# Patient Record
Sex: MALE | Race: WHITE | NOT HISPANIC OR LATINO | Employment: OTHER | URBAN - METROPOLITAN AREA
[De-identification: names, ages, dates, MRNs, and addresses within clinical notes are randomized per-mention and may not be internally consistent; named-entity substitution may affect disease eponyms.]

---

## 2019-04-22 ENCOUNTER — TRANSFERRED RECORDS (OUTPATIENT)
Dept: HEALTH INFORMATION MANAGEMENT | Facility: CLINIC | Age: 74
End: 2019-04-22

## 2019-06-24 ENCOUNTER — TRANSFERRED RECORDS (OUTPATIENT)
Dept: HEALTH INFORMATION MANAGEMENT | Facility: CLINIC | Age: 74
End: 2019-06-24

## 2020-05-11 ENCOUNTER — TRANSFERRED RECORDS (OUTPATIENT)
Dept: HEALTH INFORMATION MANAGEMENT | Facility: CLINIC | Age: 75
End: 2020-05-11

## 2020-10-21 ENCOUNTER — TRANSFERRED RECORDS (OUTPATIENT)
Dept: HEALTH INFORMATION MANAGEMENT | Facility: CLINIC | Age: 75
End: 2020-10-21

## 2020-11-16 ENCOUNTER — TRANSFERRED RECORDS (OUTPATIENT)
Dept: HEALTH INFORMATION MANAGEMENT | Facility: CLINIC | Age: 75
End: 2020-11-16

## 2020-12-04 ENCOUNTER — TRANSFERRED RECORDS (OUTPATIENT)
Dept: HEALTH INFORMATION MANAGEMENT | Facility: CLINIC | Age: 75
End: 2020-12-04

## 2020-12-28 ENCOUNTER — TRANSFERRED RECORDS (OUTPATIENT)
Dept: HEALTH INFORMATION MANAGEMENT | Facility: CLINIC | Age: 75
End: 2020-12-28

## 2021-05-29 ENCOUNTER — RECORDS - HEALTHEAST (OUTPATIENT)
Dept: ADMINISTRATIVE | Facility: CLINIC | Age: 76
End: 2021-05-29

## 2021-08-13 ENCOUNTER — TRANSFERRED RECORDS (OUTPATIENT)
Dept: HEALTH INFORMATION MANAGEMENT | Facility: CLINIC | Age: 76
End: 2021-08-13

## 2021-09-25 ENCOUNTER — TRANSFERRED RECORDS (OUTPATIENT)
Dept: HEALTH INFORMATION MANAGEMENT | Facility: CLINIC | Age: 76
End: 2021-09-25

## 2021-10-22 ENCOUNTER — TRANSFERRED RECORDS (OUTPATIENT)
Dept: HEALTH INFORMATION MANAGEMENT | Facility: CLINIC | Age: 76
End: 2021-10-22

## 2022-03-07 ENCOUNTER — TRANSFERRED RECORDS (OUTPATIENT)
Dept: HEALTH INFORMATION MANAGEMENT | Facility: CLINIC | Age: 77
End: 2022-03-07

## 2022-07-01 ENCOUNTER — TRANSFERRED RECORDS (OUTPATIENT)
Dept: HEALTH INFORMATION MANAGEMENT | Facility: CLINIC | Age: 77
End: 2022-07-01

## 2023-05-04 ENCOUNTER — TRANSFERRED RECORDS (OUTPATIENT)
Dept: HEALTH INFORMATION MANAGEMENT | Facility: CLINIC | Age: 78
End: 2023-05-04

## 2023-06-01 ENCOUNTER — TRANSFERRED RECORDS (OUTPATIENT)
Dept: HEALTH INFORMATION MANAGEMENT | Facility: CLINIC | Age: 78
End: 2023-06-01

## 2023-11-16 ENCOUNTER — ANCILLARY PROCEDURE (OUTPATIENT)
Dept: CARDIOLOGY | Facility: CLINIC | Age: 78
End: 2023-11-16
Attending: FAMILY MEDICINE
Payer: COMMERCIAL

## 2023-11-16 DIAGNOSIS — I71.20 ANEURYSM OF THORACIC AORTA (H): ICD-10-CM

## 2023-11-16 PROCEDURE — 93306 TTE W/DOPPLER COMPLETE: CPT | Performed by: INTERNAL MEDICINE

## 2023-11-16 RX ADMIN — Medication 7 ML: at 09:47

## 2023-12-21 LAB — LVEF ECHO: NORMAL

## 2024-03-02 ENCOUNTER — HEALTH MAINTENANCE LETTER (OUTPATIENT)
Age: 79
End: 2024-03-02

## 2024-04-15 ENCOUNTER — TRANSFERRED RECORDS (OUTPATIENT)
Dept: HEALTH INFORMATION MANAGEMENT | Facility: CLINIC | Age: 79
End: 2024-04-15
Payer: COMMERCIAL

## 2024-04-15 LAB
ALT SERPL-CCNC: 20 U/L
AST SERPL-CCNC: 36 U/L (ref 17–59)
CREATININE (EXTERNAL): 0.9 MG/DL (ref 0.7–1.4)
GLUCOSE (EXTERNAL): 87 MG/DL (ref 60–99)
POTASSIUM (EXTERNAL): 4.5 MMOL/L (ref 3.5–5.1)

## 2024-04-17 ENCOUNTER — TRANSFERRED RECORDS (OUTPATIENT)
Dept: HEALTH INFORMATION MANAGEMENT | Facility: CLINIC | Age: 79
End: 2024-04-17
Payer: COMMERCIAL

## 2024-04-17 ENCOUNTER — MEDICAL CORRESPONDENCE (OUTPATIENT)
Dept: HEALTH INFORMATION MANAGEMENT | Facility: CLINIC | Age: 79
End: 2024-04-17
Payer: COMMERCIAL

## 2024-04-17 LAB
ABO/RH(D): ABNORMAL
ANTIBODY SCREEN: POSITIVE
SPECIMEN EXPIRATION DATE: ABNORMAL

## 2024-04-17 NOTE — PROGRESS NOTES
Colon and Rectal Surgery Clinic Note    RE: Juan Delarosa.  : 1945.  RIRI: 2024.    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     HPI: 77 y/o M who started with progressive dyspnea since the fall .  He noticed that once after chopping wood outside and becoming slightly lightheaded.  He has not been on supplemental oxygen because of this.  He did see his primary care provider who started a workup consisting of chest x-ray followed by a CT chest that reportedly showed elevation of his right hemidiaphragm and concerns for fluid or small cystic masses below his right hemidiaphragm.  This subsequently led to further imaging:       CT CAP (4/15/24)      CT Chest (24):         MR Peritoneum (24)   IMPRESSION:   1. Ruptured appendiceal mucocele.   2.  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   3.  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   4.  Recommend surgical consultation.     Colonoscopy   Findings:  A 4 mm polyp was found in the cecum. The polyp was removed with a   cold snare. Resection and retrieval were complete.  A 0.8-1cm polyp was found in the transverse colon. The polyp was   removed with a cold snare. Resection and retrieval were complete.  Multiple large-mouthed diverticula were found in the colon. There was   no evidence of diverticular bleeding.    FINAL DIAGNOSIS   A.  Colon, Cecal polypectomy, polypectomy:    Vegetable matter and hyperplastic surface change     B.  Colon, Transverse colon polypectomy, polypectomy:    Tubular adenoma    Apart from the mild shortness of breath, Je is feeling quite well.  He is very functional and independent.  Denies abdominal pain, fevers or chills, increased abdominal distention, unintentional weight loss, or changes in bowel habits.  Denies hematochezia.  No previous abdominal operations.  He does take a baby aspirin every other day.  Denies chest pain or palpitations.   "Family history significant for gastric cancer in a maternal grandmother.    Medical history:  No past medical history on file.    Surgical history:  Past Surgical History:   Procedure Laterality Date    IR MISCELLANEOUS PROCEDURE  1/18/2006    IR MISCELLANEOUS PROCEDURE  1/18/2006       Family history:  No family history on file.    Medications:  Current Outpatient Medications   Medication Sig Dispense Refill    ezetimibe (ZETIA) 10 MG tablet Take 10 mg by mouth daily      losartan (COZAAR) 25 MG tablet Take 25 mg by mouth daily      rosuvastatin (CRESTOR) 5 MG tablet take 1 tablet by mouth once daily for 5 days         Allergies:  No Known Allergies    Social history:   Social History     Tobacco Use    Smoking status: Never    Smokeless tobacco: Never   Substance Use Topics    Alcohol use: Not on file     Marital status: .    Physical Examination:  /83 (BP Location: Left arm, Patient Position: Sitting, Cuff Size: Adult Regular)   Pulse 61   Ht 1.74 m (5' 8.5\")   Wt 91.5 kg (201 lb 12.8 oz)   SpO2 99%   BMI 30.24 kg/m    General: Well hydrated. No acute distress.  Abdomen: Soft, NT. No abdominal masses or inguinal adenopathy palpated.    Investigations:  None.    Procedures:  None.    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  I do not see a specific area that we could biopsy percutaneously.  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  We had a long discussion with regards to the potential diagnoses and how to establish a diagnosis at this time.  Percutaneous biopsies are somewhat inaccurate and frequently indeterminant.  Risks, benefits, and alternatives of operative treatment were thoroughly discussed with the " patient, he/she understands these well and agrees to proceed.    PLAN  1.  Schedule full labs including tumor markers including CEA, CA 19-9, and .  2.  Schedule diagnostic laparoscopy, peritoneal biopsies, and possible appendectomy.  I would only perform the appendectomy if it is clear that there is minimal at peritoneal involvement.  This is mainly to establish the diagnosis and if he does have peritoneal involvement fully establish the extent of it with a PCI.  He will need PAC and labs as above.    60 minutes spent on the date of encounter performing chart review, history and exam, documentation.     Justin Dela Cruz MD, MSc, FACS, FASCRS.    Chief, Division of Colon & Rectal Surgery  Stanley M. Goldberg, MD Endowed Chair, Colon & Rectal Surgery  Department of Surgery  [facility]      Referring Provider:  Kiet Mckeon MD    Primary Care Provider:  Sean Melendrez

## 2024-04-18 ENCOUNTER — LAB (OUTPATIENT)
Dept: LAB | Facility: CLINIC | Age: 79
End: 2024-04-18
Payer: COMMERCIAL

## 2024-04-18 ENCOUNTER — TRANSCRIBE ORDERS (OUTPATIENT)
Dept: OTHER | Age: 79
End: 2024-04-18

## 2024-04-18 ENCOUNTER — PRE VISIT (OUTPATIENT)
Dept: SURGERY | Facility: CLINIC | Age: 79
End: 2024-04-18

## 2024-04-18 ENCOUNTER — TELEPHONE (OUTPATIENT)
Dept: SURGERY | Facility: CLINIC | Age: 79
End: 2024-04-18

## 2024-04-18 ENCOUNTER — OFFICE VISIT (OUTPATIENT)
Dept: SURGERY | Facility: CLINIC | Age: 79
End: 2024-04-18
Payer: COMMERCIAL

## 2024-04-18 VITALS
SYSTOLIC BLOOD PRESSURE: 137 MMHG | HEIGHT: 69 IN | HEART RATE: 61 BPM | BODY MASS INDEX: 29.89 KG/M2 | WEIGHT: 201.8 LBS | DIASTOLIC BLOOD PRESSURE: 83 MMHG | OXYGEN SATURATION: 99 %

## 2024-04-18 DIAGNOSIS — C18.1 MALIGNANT NEOPLASM OF APPENDIX (H): ICD-10-CM

## 2024-04-18 DIAGNOSIS — C78.6 PSEUDOMYXOMA PERITONEI (H): Primary | ICD-10-CM

## 2024-04-18 DIAGNOSIS — C18.1 MALIGNANT NEOPLASM OF APPENDIX (H): Primary | ICD-10-CM

## 2024-04-18 DIAGNOSIS — C78.6 PSEUDOMYXOMA PERITONEI (H): ICD-10-CM

## 2024-04-18 DIAGNOSIS — R97.8 OTHER ABNORMAL TUMOR MARKERS: ICD-10-CM

## 2024-04-18 LAB
ALBUMIN SERPL BCG-MCNC: 4.5 G/DL (ref 3.5–5.2)
ALP SERPL-CCNC: 75 U/L (ref 40–150)
ALT SERPL W P-5'-P-CCNC: 17 U/L (ref 0–70)
ANION GAP SERPL CALCULATED.3IONS-SCNC: 10 MMOL/L (ref 7–15)
ANTIBODY ID: NORMAL
AST SERPL W P-5'-P-CCNC: 24 U/L (ref 0–45)
BASOPHILS # BLD AUTO: 0 10E3/UL (ref 0–0.2)
BASOPHILS NFR BLD AUTO: 1 %
BILIRUB SERPL-MCNC: 0.5 MG/DL
BUN SERPL-MCNC: 17.7 MG/DL (ref 8–23)
CALCIUM SERPL-MCNC: 9.8 MG/DL (ref 8.8–10.2)
CHLORIDE SERPL-SCNC: 103 MMOL/L (ref 98–107)
CREAT SERPL-MCNC: 0.99 MG/DL (ref 0.67–1.17)
DEPRECATED HCO3 PLAS-SCNC: 28 MMOL/L (ref 22–29)
EGFRCR SERPLBLD CKD-EPI 2021: 77 ML/MIN/1.73M2
EOSINOPHIL # BLD AUTO: 0.1 10E3/UL (ref 0–0.7)
EOSINOPHIL NFR BLD AUTO: 1 %
ERYTHROCYTE [DISTWIDTH] IN BLOOD BY AUTOMATED COUNT: 13.4 % (ref 10–15)
GLUCOSE SERPL-MCNC: 99 MG/DL (ref 70–99)
HCT VFR BLD AUTO: 47.5 % (ref 40–53)
HGB BLD-MCNC: 15.8 G/DL (ref 13.3–17.7)
IMM GRANULOCYTES # BLD: 0 10E3/UL
IMM GRANULOCYTES NFR BLD: 0 %
LYMPHOCYTES # BLD AUTO: 1.6 10E3/UL (ref 0.8–5.3)
LYMPHOCYTES NFR BLD AUTO: 24 %
M AG RBC QL: NEGATIVE
MCH RBC QN AUTO: 30.2 PG (ref 26.5–33)
MCHC RBC AUTO-ENTMCNC: 33.3 G/DL (ref 31.5–36.5)
MCV RBC AUTO: 91 FL (ref 78–100)
MONOCYTES # BLD AUTO: 0.7 10E3/UL (ref 0–1.3)
MONOCYTES NFR BLD AUTO: 10 %
NEUTROPHILS # BLD AUTO: 4.3 10E3/UL (ref 1.6–8.3)
NEUTROPHILS NFR BLD AUTO: 64 %
NRBC # BLD AUTO: 0 10E3/UL
NRBC BLD AUTO-RTO: 0 /100
PLATELET # BLD AUTO: 268 10E3/UL (ref 150–450)
POTASSIUM SERPL-SCNC: 4.9 MMOL/L (ref 3.4–5.3)
PROT SERPL-MCNC: 7.9 G/DL (ref 6.4–8.3)
RBC # BLD AUTO: 5.23 10E6/UL (ref 4.4–5.9)
SODIUM SERPL-SCNC: 141 MMOL/L (ref 135–145)
SPECIMEN EXPIRATION DATE: NORMAL
SPECIMEN EXPIRATION DATE: NORMAL
WBC # BLD AUTO: 6.8 10E3/UL (ref 4–11)

## 2024-04-18 PROCEDURE — 82378 CARCINOEMBRYONIC ANTIGEN: CPT | Performed by: COLON & RECTAL SURGERY

## 2024-04-18 PROCEDURE — 85025 COMPLETE CBC W/AUTO DIFF WBC: CPT | Performed by: PATHOLOGY

## 2024-04-18 PROCEDURE — 86905 BLOOD TYPING RBC ANTIGENS: CPT | Performed by: COLON & RECTAL SURGERY

## 2024-04-18 PROCEDURE — 86870 RBC ANTIBODY IDENTIFICATION: CPT

## 2024-04-18 PROCEDURE — 99205 OFFICE O/P NEW HI 60 MIN: CPT | Performed by: COLON & RECTAL SURGERY

## 2024-04-18 PROCEDURE — 86900 BLOOD TYPING SEROLOGIC ABO: CPT

## 2024-04-18 PROCEDURE — 80053 COMPREHEN METABOLIC PANEL: CPT | Performed by: PATHOLOGY

## 2024-04-18 PROCEDURE — 84134 ASSAY OF PREALBUMIN: CPT | Performed by: COLON & RECTAL SURGERY

## 2024-04-18 PROCEDURE — 86301 IMMUNOASSAY TUMOR CA 19-9: CPT | Mod: 90 | Performed by: PATHOLOGY

## 2024-04-18 PROCEDURE — 36415 COLL VENOUS BLD VENIPUNCTURE: CPT | Performed by: PATHOLOGY

## 2024-04-18 PROCEDURE — 86304 IMMUNOASSAY TUMOR CA 125: CPT | Mod: GA | Performed by: COLON & RECTAL SURGERY

## 2024-04-18 PROCEDURE — 99000 SPECIMEN HANDLING OFFICE-LAB: CPT | Performed by: PATHOLOGY

## 2024-04-18 RX ORDER — LOSARTAN POTASSIUM 25 MG/1
25 TABLET ORAL AT BEDTIME
Status: ON HOLD | COMMUNITY
End: 2024-05-15

## 2024-04-18 RX ORDER — EZETIMIBE 10 MG/1
10 TABLET ORAL AT BEDTIME
COMMUNITY

## 2024-04-18 RX ORDER — ROSUVASTATIN CALCIUM 5 MG/1
5 TABLET, COATED ORAL EVERY OTHER DAY
COMMUNITY

## 2024-04-18 ASSESSMENT — PAIN SCALES - GENERAL: PAINLEVEL: NO PAIN (0)

## 2024-04-18 NOTE — TELEPHONE ENCOUNTER
Spoke with patient's spouse Bren, she says that an EVARISTO should be on-file for her to speak on patient's behalf. She was with patient at his appointment with Dr. Dela Cruz in which they discussed scheduling surgery for patient.    Patient was not available, made plans to call back later or tomorrow morning when patient will also be available.     Capri Blanton  Edilma-op Coordinator  [place]  Direct Phone: [phone]        no

## 2024-04-18 NOTE — TELEPHONE ENCOUNTER
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 Melendrez   OFFICE NOTE from other specialist N/A    DISCHARGE SUMMARY from hospital N/A    DISCHARGE REPORT from the ER N/A    OPERATIVE REPORT N/A    MEDICATION LIST Received    LABS     ANAL PAP/CEA N/A    BIOPSIES/PATHOLOGY RELATED TO DIAGNOSIS Care Everywhere [facility]:  22 - Colon Biopsy (Case: [id number])   DIAGNOSTIC PROCEDURES     PFC TESTING (from the Pelvic floor center includes Manometry, PDNL, EMG, etc.) N/A    COLONOSCOPY (most recent all time after 5 years) Care Everywhere [facility]:  22 - Colonoscopy   FLEX SIGMOIDOSCOPY N/A    UPPER ENDOSCOPY (EGD) N/A    ERCP N/A    IMAGING (DISC & REPORT)      CT In process [facility] - CT Chest  4/15/24 - CT Abd/Pelvis   MRI Received [facility]:  24 - MRI Abd/Pelvis   XRAY N/A    ULTRASOUND  (ENDOANAL/ENDORECTAL) N/A      Records Requested  24    Facility  [facility]  Fax: [phone]  [facility]  Fax: [phone]   Outcome * 24 7:44 AM Faxed urgent request to [facility] and [facility] for records and images to be sent to the clinic. - Shelia    * 24 10:02 AM Images received from  and attached to the patient in PACs. - Shelia    * 24 10:25 AM Records received from [facility] and sent to [facility] to be scanned into the chart. - Shelia    * 24 2:36 PM Imaging disc received from [facility] and sent to [facility] to be uploaded into PACs. - Shelia    Trackin

## 2024-04-18 NOTE — PATIENT INSTRUCTIONS
Follow up:    Schedule surgery with Capri at [phone]  Pre-op physical   Labs today at 12pm    Cardiology clearance from your cardiologist      Please call with any questions or concerns regarding your clinic visit today.    It is a pleasure being involved in your health care.    Contacts post-consultation depending on your need:    Radiology Appointments [phone]    Schedule Clinic Appointments [phone] # 1   M-F 7:30 - 5 pm    DARNELL Wheeler [phone]    Clinic Fax Number [phone]    Surgery Scheduling [phone]    My Chart is available 24 hours a day and is a secure way to access your records and communicate with your care team.  I strongly recommend signing up if you haven't already done so, if you are comfortable with computers.  If you would like to inquire about this or are having problems with My Chart access, you may call [phone] or go online at [email or web address].  Please allow at least 24 hours for a response and extra time on weekends and Holidays.

## 2024-04-18 NOTE — NURSING NOTE
"Chief Complaint   Patient presents with    Cancer     Appendiceal Cancer       Vitals:    04/18/24 1039   BP: 137/83   BP Location: Left arm   Patient Position: Sitting   Cuff Size: Adult Regular   Pulse: 61   SpO2: 99%   Weight: 201 lb 12.8 oz   Height: 5' 8.5\"       Body mass index is 30.24 kg/m .    Bradly FLORESP    "

## 2024-04-18 NOTE — LETTER
2024       RE: Juan Delarosa  [address]  [facility] [address]       Dear Colleague,    Thank you for referring your patient, Juan Delarosa, to the [facility] COLON AND RECTAL SURGERY CLINIC [place] at [facility]. Please see a copy of my visit note below.    Colon and Rectal Surgery Clinic Note    RE: Juan Delarosa.  : 1945.  RIRI: 2024.    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     HPI: 77 y/o M who started with progressive dyspnea since the 2023.  He noticed that once after chopping wood outside and becoming slightly lightheaded.  He has not been on supplemental oxygen because of this.  He did see his primary care provider who started a workup consisting of chest x-ray followed by a CT chest that reportedly showed elevation of his right hemidiaphragm and concerns for fluid or small cystic masses below his right hemidiaphragm.  This subsequently led to further imaging:       CT CAP (4/15/24)      CT Chest (24):         MR Peritoneum (24)   IMPRESSION:   1. Ruptured appendiceal mucocele.   2.  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   3.  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   4.  Recommend surgical consultation.     Colonoscopy   Findings:  A 4 mm polyp was found in the cecum. The polyp was removed with a   cold snare. Resection and retrieval were complete.  A 0.8-1cm polyp was found in the transverse colon. The polyp was   removed with a cold snare. Resection and retrieval were complete.  Multiple large-mouthed diverticula were found in the colon. There was   no evidence of diverticular bleeding.    FINAL DIAGNOSIS   A.  Colon, Cecal polypectomy, polypectomy:    Vegetable matter and hyperplastic surface change     B.  Colon, Transverse colon polypectomy, polypectomy:    Tubular adenoma    Apart from the mild shortness of breath, Je is feeling quite  "well.  He is very functional and independent.  Denies abdominal pain, fevers or chills, increased abdominal distention, unintentional weight loss, or changes in bowel habits.  Denies hematochezia.  No previous abdominal operations.  He does take a baby aspirin every other day.  Denies chest pain or palpitations.  Family history significant for gastric cancer in a maternal grandmother.    Medical history:  No past medical history on file.    Surgical history:  Past Surgical History:   Procedure Laterality Date    IR MISCELLANEOUS PROCEDURE  1/18/2006    IR MISCELLANEOUS PROCEDURE  1/18/2006       Family history:  No family history on file.    Medications:  Current Outpatient Medications   Medication Sig Dispense Refill    ezetimibe (ZETIA) 10 MG tablet Take 10 mg by mouth daily      losartan (COZAAR) 25 MG tablet Take 25 mg by mouth daily      rosuvastatin (CRESTOR) 5 MG tablet take 1 tablet by mouth once daily for 5 days         Allergies:  No Known Allergies    Social history:   Social History     Tobacco Use    Smoking status: Never    Smokeless tobacco: Never   Substance Use Topics    Alcohol use: Not on file     Marital status: .    Physical Examination:  /83 (BP Location: Left arm, Patient Position: Sitting, Cuff Size: Adult Regular)   Pulse 61   Ht 1.74 m (5' 8.5\")   Wt 91.5 kg (201 lb 12.8 oz)   SpO2 99%   BMI 30.24 kg/m    General: Well hydrated. No acute distress.  Abdomen: Soft, NT. No abdominal masses or inguinal adenopathy palpated.    Investigations:  None.    Procedures:  None.    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  I do not see a specific area that we could biopsy percutaneously.  His imaging is more consistent with a low-grade appendiceal mucinous neoplasm versus carcinomatosis per se but we do not have a " tissue diagnosis at this time.  We had a long discussion with regards to the potential diagnoses and how to establish a diagnosis at this time.  Percutaneous biopsies are somewhat inaccurate and frequently indeterminant.  Risks, benefits, and alternatives of operative treatment were thoroughly discussed with the patient, he/she understands these well and agrees to proceed.    PLAN  1.  Schedule full labs including tumor markers including CEA, CA 19-9, and .  2.  Schedule diagnostic laparoscopy, peritoneal biopsies, and possible appendectomy.  I would only perform the appendectomy if it is clear that there is minimal at peritoneal involvement.  This is mainly to establish the diagnosis and if he does have peritoneal involvement fully establish the extent of it with a PCI.  He will need PAC and labs as above.    60 minutes spent on the date of encounter performing chart review, history and exam, documentation.       Referring Provider:  Kiet Mckeon MD    Primary Care Provider:  Sean Melendrez      Again, thank you for allowing me to participate in the care of your patient.      Sincerely,    Justin Dela Cruz MD

## 2024-04-19 LAB
CANCER AG125 SERPL-ACNC: 38 U/ML
CEA SERPL-MCNC: 4.2 NG/ML
PREALB SERPL-MCNC: 23.2 MG/DL (ref 20–40)

## 2024-04-19 NOTE — TELEPHONE ENCOUNTER
Spoke with patient and spouse Bren via phone, explained we will find out early this afternoon whether patient's surgery with Dr. Dela Cruz can be scheduled on Thurs 5/2/2024.    Otherwise, the back-up surgery date would be 6/14/2024.    Tentatively scheduled surgery-related appts for possible surgery date 5/2/2024.    Capri Blanton  Edilma-op Coordinator  [place]  Direct Phone: [phone]

## 2024-04-19 NOTE — TELEPHONE ENCOUNTER
FUTURE VISIT INFORMATION      SURGERY INFORMATION:  Date: 5/2/24  Location: [facility] or  Surgeon:  Justin Dela Cruz MD   Anesthesia Type:  general  Procedure: LAPAROSCOPY, DIAGNOSTIC, laparoscopic appendectomy   Consult: ov 4/18/24    RECORDS REQUESTED FROM:       Primary Care Provider: Dexter Tijerina MD  - [facility]    Most recent Cardiac Stress Test: 12/4/23- [facility]

## 2024-04-19 NOTE — TELEPHONE ENCOUNTER
Patient is scheduled for surgery with Dr. Justin Dela Cruz     Spoke with: Juan    Date of Surgery: Tues 5/14/2024    Location: [facility] OR    Informed patient they will need an adult  (surgery admit)    Pre op with Provider Dr. Justin Dela Cruz     Upcoming Related Appointments:     Tue Apr 30, 2024 10:30 AM  Pre-op Physical  (Arrive by 10:15 AM) anesthesiology clinic  PAC EVALUATION with LISA Anders  [facility] Preoperative Assessment [facility] [phone]    [address] [place] [address]      Tue Apr 30, 2024 11:45 AM  LAB with  LAB  [facility] Lab [place] ([facility] ) [phone]    [address] [place] [address]      Wed May 29, 2024 11:00 AM  (Arrive by 10:45 AM)  Post Op with LISA Lassiter [facility] [phone]    [address] [place] [address]      Mon Jun 24, 2024  9:30 AM  (Arrive by 9:15 AM)  Post Op with Justin Dela Cruz MD  [facility] Colon and Rectal Surgery [facility] [phone]    [address] [place] [address]      Surgery packet: sent via [employer]     Additional comments:   - surgery date 5/2/2024 was approved, but unfortunately Dr. Justin Dela Cruz is not available that day d/t a conflict with his schedule.    Capri Blanton  Edilma-op Coordinator  [place]  Direct Phone: [phone]

## 2024-04-20 LAB — CANCER AG19-9 SERPL IA-ACNC: 9 U/ML

## 2024-04-26 ENCOUNTER — PRE VISIT (OUTPATIENT)
Dept: SURGERY | Facility: CLINIC | Age: 79
End: 2024-04-26

## 2024-04-30 ENCOUNTER — PRE VISIT (OUTPATIENT)
Dept: SURGERY | Facility: CLINIC | Age: 79
End: 2024-04-30

## 2024-04-30 ENCOUNTER — ANESTHESIA EVENT (OUTPATIENT)
Dept: SURGERY | Facility: CLINIC | Age: 79
End: 2024-04-30
Payer: MEDICARE

## 2024-04-30 ENCOUNTER — OFFICE VISIT (OUTPATIENT)
Dept: SURGERY | Facility: CLINIC | Age: 79
End: 2024-04-30
Payer: COMMERCIAL

## 2024-04-30 VITALS
RESPIRATION RATE: 16 BRPM | HEART RATE: 58 BPM | WEIGHT: 200 LBS | DIASTOLIC BLOOD PRESSURE: 80 MMHG | OXYGEN SATURATION: 97 % | BODY MASS INDEX: 29.62 KG/M2 | SYSTOLIC BLOOD PRESSURE: 133 MMHG | TEMPERATURE: 97.5 F | HEIGHT: 69 IN

## 2024-04-30 DIAGNOSIS — C18.1 MALIGNANT NEOPLASM OF APPENDIX (H): ICD-10-CM

## 2024-04-30 DIAGNOSIS — Z01.818 PREOP EXAMINATION: Primary | ICD-10-CM

## 2024-04-30 PROCEDURE — 99204 OFFICE O/P NEW MOD 45 MIN: CPT | Performed by: CLINICAL NURSE SPECIALIST

## 2024-04-30 RX ORDER — MULTIVIT-MIN/IRON/FOLIC ACID/K 18-600-40
5000 CAPSULE ORAL AT BEDTIME
Status: ON HOLD | COMMUNITY
End: 2024-06-24

## 2024-04-30 RX ORDER — NITROGLYCERIN 0.4 MG/1
0.4 TABLET SUBLINGUAL EVERY 5 MIN PRN
COMMUNITY
End: 2024-07-08

## 2024-04-30 RX ORDER — ASPIRIN 81 MG/1
81 TABLET ORAL
Status: ON HOLD | COMMUNITY
End: 2024-06-24

## 2024-04-30 ASSESSMENT — PAIN SCALES - GENERAL: PAINLEVEL: NO PAIN (0)

## 2024-04-30 ASSESSMENT — LIFESTYLE VARIABLES: TOBACCO_USE: 0

## 2024-04-30 ASSESSMENT — ENCOUNTER SYMPTOMS
DYSRHYTHMIAS: 0
SEIZURES: 0

## 2024-04-30 NOTE — H&P
Pre-Operative H & P     CC:  Preoperative exam to assess for increased cardiopulmonary risk while undergoing surgery and anesthesia.    Date of Encounter: 4/30/2024  Primary Care Physician:  Sean Melendrez     Reason for visit:   Encounter Diagnoses   Name Primary?    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  His primary team initiated a workup consisting of chest x-ray followed by a CT of chest that revealed elevation of the right hemidiaphragm and there was concern for fluid or some small cystic masses below his right hemidiaphragm. Additional work up revealed evidence of a cystic neoplasm of the appendix with evidence of rupture and possible adenomucinosis versus carcinomatosis. He was referred to Dr. Dela Cruz with imaging reviewed. Due to lack of tissue diagnosis, but concern for low-grade appendiceal mucinous neoplasm versus carcinomatosis on imaging, patient was counseled for above procedures.    His history is otherwise significant for HLD, HTN, CAD, s/p PCI to mid LCx, (mild-moderate CAD in distal LM, LAD, and RCA), dilated ascending aorta, GERD, kidney stone, and blood antibody.  For his cardiac issues he is followed by cardiology last seen on 11/30/2023. An exercise stress test was recommended at that time with results below from 12/4/23     Summary    1. The patient  exercised for 10 minutes and 10 seconds on the  Ellis protocol.     2. Excellent functional capacity for age.     3. The patient experienced dyspnea (moderate to severe) during the stress test.     4. Dyspnea resolved appropriately during recovery.     5. Hypertensive response to stress (peak 182/74 mmHg).     6. Ectopy consisted of frequent PVCs and occasional ventricular couplets during exercise and in recovery.     7. Negative stress ECG for ST segment depression.     8. Normal left ventricular systolic function with no regional wall motion abnormalities noted at rest.   LVEF 55%.     9. Post stress, no wall motion abnormalities seen.   LV systolic size decreases and systolic function increases appropriately.     10. No echocardiographic evidence of ischemia.     11. Mild aortic valve regurgitation.     12. The aortic root measures mildly dilated at 4.2 cm.     13. The proximal ascending aorta measures moderately dilated at 4.8 cm.     14. Compared to the prior TTE report from 10/14/22, the prox asc aorta has grown in size (4.6 -> 4.8 cm). AR now appears mild in severity (previously trace).     Patient reports that he will be seeing his Cardiology team again on 5/3/24 for cardiac preop evaluation for surgery.       Hx of abnormal bleeding or anti-platelet use: ASA 81 mg at HS      Past Medical History  Past Medical History:   Diagnosis Date    Ascending aorta dilation (H24)     CAD (coronary artery disease)     Concussion     Gastroesophageal reflux disease     HLD (hyperlipidemia)     HTN (hypertension)     Kidney stone     Malignant neoplasm of appendix (H)     PONV (postoperative nausea and vomiting)     Pseudomyxoma peritonei (H)     Vertigo        Past Surgical History  Past Surgical History:   Procedure Laterality Date    COLONOSCOPY      mulitple    IR MISCELLANEOUS PROCEDURE  01/18/2006    IR MISCELLANEOUS PROCEDURE  01/18/2006    KNEE SURGERY Bilateral     LUMBAR DISCECTOMY      PERCUTANEOUS NEPHROSTOMY       SHOULDER SURGERY         Prior to Admission Medications  Current Outpatient Medications   Medication Sig Dispense Refill    aspirin 81 MG EC tablet Take 81 mg by mouth at bedtime      ezetimibe (ZETIA) 10 MG tablet Take 10 mg by mouth at bedtime      losartan (COZAAR) 25 MG tablet Take 25 mg by mouth at bedtime      nitroGLYcerin (NITROSTAT) 0.4 MG sublingual tablet Place 0.4 mg under the tongue every 5 minutes as needed for chest pain      rosuvastatin (CRESTOR) 5 MG tablet Take 5 mg by mouth at bedtime      Cholecalciferol (VITAMIN D) 50 MCG (2000 UT) CAPS Take 5,000 Units by mouth daily         Allergies  Allergies   Allergen Reactions    Blood Transfusion Related (Informational Only)      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  A delay in compatible RBCs may occur.    Gabapentin Dizziness       Social History  Social History     Socioeconomic History    Marital status:      Spouse name: Not on file    Number of children: Not on file    Years of education: Not on file    Highest education level: Not on file   Occupational History    Not on file   Tobacco Use    Smoking status: Never    Smokeless tobacco: Never   Substance and Sexual Activity    Alcohol use: Never    Drug use: Never    Sexual activity: Not on file   Other Topics Concern    Not on file   Social History Narrative    Not on file     Social Determinants of Health     Financial Resource Strain: Not on file   Food Insecurity: Not on file   Transportation Needs: Not on file   Physical Activity: Not on file   Stress: Not on file   Social Connections: Not on file   Interpersonal Safety: Not on file   Housing Stability: Not on file       Family History  Family History   Problem Relation Age of Onset    Diabetes Type 2  Sister     Diabetes Type 2  Sister     Diabetes Type 2  Sister     Diabetes Type 2  Brother     Diabetes Type 2  Brother     Diabetes Type 2  Brother     Diabetes Type 2  Brother   "   Diabetes Type 2  Brother     Diabetes Type 2  Brother     Stomach Cancer Maternal Grandmother     Anesthesia Reaction No family hx of     Clotting Disorder No family hx of     Bleeding Disorder No family hx of        Review of Systems  The complete review of systems is negative other than noted in the HPI or here.   Anesthesia Evaluation   Pt has had prior anesthetic. Type: General and MAC.    No history of anesthetic complications       ROS/MED HX  ENT/Pulmonary:    (-) tobacco use and recent URI   Neurologic:    (-) no seizures and no CVA   Cardiovascular: Comment: Borderline to mild aortic root enlargement. Moderate to severe ascending  aortic dilation measuring 4.8 cm in diameter.      (+) Dyslipidemia hypertension- Peripheral Vascular Disease-- Other.  CAD -  - stent-2020. 1 Drug Eluting Stent.                               Previous cardiac testing   Echo: Date: 11/16/23 Results:    Stress Test:  Date: 12/4/23 Results:    ECG Reviewed:  Date: 2020 Results:  SR, nonspecific T wave abnormality  Cath:  Date: 2020 Results:   (-) arrhythmias   METS/Exercise Tolerance:     Hematologic:     (+)       history of blood transfusion, no previous transfusion reaction, Known PRBC Anitbodies: Yes, - Anti-M,   (-) history of blood clots   Musculoskeletal:  - neg musculoskeletal ROS     GI/Hepatic:    (-) GERD   Renal/Genitourinary:     (+)       Nephrolithiasis ,       Endo:  - neg endo ROS     Psychiatric/Substance Use:  - neg psychiatric ROS  (-) psychiatric history   Infectious Disease:  - neg infectious disease ROS     Malignancy:   (+) Malignancy, History of GI.GI CA  Active status post.      Other:  - neg other ROS          /80 (BP Location: Right arm, Patient Position: Sitting, Cuff Size: Adult Large)   Pulse 58   Temp 97.5  F (36.4  C) (Oral)   Resp 16   Ht 1.74 m (5' 8.5\")   Wt 90.7 kg (200 lb)   SpO2 97%   BMI 29.97 kg/m      Physical Exam   Constitutional: Awake, alert, cooperative, no apparent " distress, and appears stated age. Accompanied by wife.   Eyes: Pupils equal, round and reactive to light, extra ocular muscles intact, sclera clear, conjunctiva normal.  HENT: Normocephalic, oral pharynx with moist mucus membranes, good dentition. No goiter appreciated.   Respiratory: Clear to auscultation bilaterally, no crackles or wheezing. No cough or obvious dyspnea.  Cardiovascular: Regular rate and rhythm, normal S1 and S2, and no murmur noted. Carotids +2, no bruits. No edema. Palpable pulses to radial  DP and PT arteries.   GI: Normal bowel sounds, soft, non-distended, non-tender, no masses palpated, no hepatosplenomegaly.    Lymph/Hematologic: No cervical lymphadenopathy and no supraclavicular lymphadenopathy.  Genitourinary:  Deferred  Skin: Warm and dry.  No rashes at anticipated surgical site.   Musculoskeletal: Full ROM of neck. There is no redness, warmth, or swelling of the joints. Gross motor strength is normal.    Neurologic: Awake, alert, oriented to name, place and time. Cranial nerves II-XII are grossly intact. Gait is normal.   Neuropsychiatric: Calm, cooperative. Normal affect.     Prior Labs/Diagnostic Studies   All labs and imaging personally reviewed   Lab Results   Component Value Date    WBC 6.8 04/18/2024     Lab Results   Component Value Date    RBC 5.23 04/18/2024     Lab Results   Component Value Date    HGB 15.8 04/18/2024     Lab Results   Component Value Date    HCT 47.5 04/18/2024     Lab Results   Component Value Date    MCV 91 04/18/2024     Lab Results   Component Value Date    MCH 30.2 04/18/2024     Lab Results   Component Value Date    MCHC 33.3 04/18/2024     Lab Results   Component Value Date    RDW 13.4 04/18/2024     Lab Results   Component Value Date     04/18/2024     Last Comprehensive Metabolic Panel:  Sodium   Date Value Ref Range Status   04/18/2024 141 135 - 145 mmol/L Final     Comment:     Reference intervals for this test were updated on 09/26/2023 to  more accurately reflect our healthy population. There may be differences in the flagging of prior results with similar values performed with this method. Interpretation of those prior results can be made in the context of the updated reference intervals.      Potassium   Date Value Ref Range Status   04/18/2024 4.9 3.4 - 5.3 mmol/L Final     Chloride   Date Value Ref Range Status   04/18/2024 103 98 - 107 mmol/L Final     Carbon Dioxide (CO2)   Date Value Ref Range Status   04/18/2024 28 22 - 29 mmol/L Final     Anion Gap   Date Value Ref Range Status   04/18/2024 10 7 - 15 mmol/L Final     Glucose   Date Value Ref Range Status   04/18/2024 99 70 - 99 mg/dL Final     Urea Nitrogen   Date Value Ref Range Status   04/18/2024 17.7 8.0 - 23.0 mg/dL Final     Creatinine   Date Value Ref Range Status   04/18/2024 0.99 0.67 - 1.17 mg/dL Final     GFR Estimate   Date Value Ref Range Status   04/18/2024 77 >60 mL/min/1.73m2 Final     Calcium   Date Value Ref Range Status   04/18/2024 9.8 8.8 - 10.2 mg/dL Final     Bilirubin Total   Date Value Ref Range Status   04/18/2024 0.5 <=1.2 mg/dL Final     Alkaline Phosphatase   Date Value Ref Range Status   04/18/2024 75 40 - 150 U/L Final     Comment:     Reference intervals for this test were updated on 11/14/2023 to more accurately reflect our healthy population. There may be differences in the flagging of prior results with similar values performed with this method. Interpretation of those prior results can be made in the context of the updated reference intervals.     ALT   Date Value Ref Range Status   04/18/2024 17 0 - 70 U/L Final     Comment:     Reference intervals for this test were updated on 6/12/2023 to more accurately reflect our healthy population. There may be differences in the flagging of prior results with similar values performed with this method. Interpretation of those prior results can be made in the context of the updated reference intervals.       AST    Date Value Ref Range Status   2024 24 0 - 45 U/L Final     Comment:     Reference intervals for this test were updated on 2023 to more accurately reflect our healthy population. There may be differences in the flagging of prior results with similar values performed with this method. Interpretation of those prior results can be made in the context of the updated reference intervals.     EK Sinus rhythm, nonspecific T wave abnormality    Echocardiogram 23   Interpretation Summary     There is mild concentric left ventricular hypertrophy.  The visual ejection fraction is 50-55%.  There is mild inferior wall hypokinesis.  The right ventricle is normal in size and function.  No significant valve disease.  Borderline to mild aortic root enlargement. Moderate to severe ascending  aortic dilation measuring 4.8 cm in diameter.    MR abd/pelvis 24    IMPRESSION:   1. Ruptured appendiceal mucocele.   2.  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   3.  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   4.  Recommend surgical consultation.   The patient's records and results personally reviewed by this provider.     Outside records reviewed from: Care Everywhere        Assessment    Juan Delarosa is a 78 year old male seen as a PAC referral for risk assessment and optimization for anesthesia.    Plan/Recommendations  Pt will be optimized for the proposed procedure.  See below for details on the assessment, risk, and preoperative recommendations    NEUROLOGY  - No history of TIA, CVA or seizure  History of concussion 1-2 years ago. No residual symptoms  -Post Op delirium risk factors:  Age    ENT  - No current airway concerns.  Will need to be reassessed day of surgery.  Mallampati: II  TM: > 3    History of positional vertigo s/p multiple therapies including Epley maneuver. No significant issues for past year    CARDIAC  HLD. Zetia and Crestor at HS. HYPERTENSION.  "Good control. Losartan at HS. CAD s/p PCI with JO ANN to mid-LCx . Low-normal LVEF 50-55%, mild inferior wall hypokinesis. Aortic root moderately dilated at 4.8 cm. Proximal ascending aorta had grown in size (4.6->4.8cm) on last echocardiogram. ASA 81 mg at HS, but will hold for 7 days prior to surgery. Denies chest pain or irregular HR. Continues to have some DYSPNEA ON EXERTION which led to evaluation above.     Patient reports that he has a cardiology visit on 5/3/24 for cardiac preop evaluation for surgery.     - METS (Metabolic Equivalents)<4    RCRI: 6.6% risk of serious cardiac events    PULMONARY  Denies asthma, cough or use of inhaler.   Intermediate risk for DEEP  - Tobacco History    History   Smoking Status    Never   Smokeless Tobacco    Never       GI: Denies GERD.   PONV Medium Risk  Total Score: 2           1 AN PONV: Patient is not a current smoker    1 AN PONV: Patient has history of PONV      PONV. Past history. Final decisions regarding prophylaxis by Anesthesia on DOS.    /RENAL  - Baseline Creatinine  0.99  History of kidney stones    ENDOCRINE    - BMI: Estimated body mass index is 29.97 kg/m  as calculated from the following:    Height as of this encounter: 1.74 m (5' 8.5\").    Weight as of this encounter: 90.7 kg (200 lb).  Overweight (BMI 25.0-29.9)  - No history of Diabetes Mellitus    HEME: VTE risk 3%  Denies personal or family history of blood clots  Denies personal or family history of bleeding disorder  Denies history of blood transfusion     Type and screen drawn by Dr. Dela Cruz on 4/18/24 revealed Anti-M blood antibody. Patient not aware and was provided written and verbal information. Discussed with Blood Bank. Patient is from [place] but plan was made for him to have updated type and screen day before surgery at [facility]. Order provided and lab appt made by coordinator.     May require updated type and screen on DOS     Different anesthesia methods/types have been " "discussed with the patient, but they are aware that the final plan will be decided by the assigned anesthesia provider on the date of service.  Patient was discussed with Dr Arce    The patient is optimized for their procedure. AVS with information on surgery time/arrival time, meds and NPO status given by nursing staff. No further diagnostic testing indicated.    ADDENDUM: 5/13/24    Updated type and screen drawn today at [facility] as planned.    Patient also had cardiac preop evaluation on 5/3/24 in [place].     From notes:   \"#pre-op cardiac risk assessment  -No CP, HF or arrhythmia symptoms. Dyspnea on exertion has actually been improving.  -DEMARCUS 12/2023 showed excellent exercise capacity for his age and gender, normal heart function.  -no further cardiac testing indicated at this time.  He is on good medical therapy. Overall low CV risk.     #CAD s/p PCI with JO ANN to mid-LCx (mild-moderate CAD in distal LM, LAD, and RCA),  -- Low-normal LVEF 50-55%, mild inferior wall hypokinesis.      #asc aortic aneurysm 4.8cm  -repeat TTR fall 2024 planned     Follow up: Fall 2024 as previously recommended.  Of course, the patient was encouraged to contact us sooner with questions or concerns.\"    On the day of service:     Prep time: 15 minutes  Visit time: 17 minutes  Documentation time: 15 minutes  ------------------------------------------  Total time: 47 minutes      LISA Anders CNS  Preoperative Assessment Center  [facility]  Clinic and Surgery Center  Phone: [phone]  Fax: [phone]    "

## 2024-04-30 NOTE — PATIENT INSTRUCTIONS
Preparing for Your Surgery      Name:  Juan Delarosa   MRN:  [id number]   :  1945   Today's Date:  2024       Arriving for surgery:  Surgery date:  24  Arrival time:  11:40 am    Please come to:     [facility]    [address] [place]  [address]     The [facility] ([facility] [place] Patient/Visitor Ramp is at [address]. Patients and visitors who self-park will receive the reduced hospital parking rate. If the Patient /Visitor Ramp is full, please follow the signs to the [employer] car park located at the main hospital entrance.       parking is available (24 hours/ 7 days a week)      Discounted parking pass options are available for patients and visitors. They can be purchased at the [employer] desk at the [facility] hospital entrance.     -  Stop at the security desk and they will direct surgery patients to Registration and the Surgery Check in and Family Lounge. [phone]        - If you need directions, a wheelchair or an escort please stop at the Information/security desk in the lobby.     What can I eat or drink?  -  Follow [facility] instructions for starting a Bowel Prep and Clear Liquid diet.  -  You may have clear liquids until 2 hours prior to arrival time. (Until 9:40 am)    Examples of clear liquids:  Water  Clear broth  Juices (apple, white grape, white cranberry  and cider) without pulp  Noncarbonated, powder based beverages  (lemonade and Isai-Aid)  Sodas (Sprite, 7-Up, ginger ale and seltzer)  Coffee or tea (without milk or cream)  Gatorade    -  No Alcohol or cannabis products for at least 24 hours before surgery.     Which medicines can I take?  Hold Aspirin for 7 days before surgery. Take the last Aspirin on 24, and then hold until surgery.  Hold Multivitamins for 7 days before surgery.  Hold Supplements for 7 days before surgery.  Hold Ibuprofen (Advil,  Motrin) for 1 day before surgery--unless otherwise directed by surgeon.  Hold Naproxen (Aleve) for 4 days before surgery.  Acetaminophen (Tylenol) is okay to take if needed.    -  DO NOT take these medications the day of surgery:  Vitamin D (Cholecalciferol)    -  PLEASE TAKE these medications the day of surgery:  Acetaminophen (Tylenol) if needed    How do I prepare myself?  - Please take 2 showers (one the night prior to surgery and one the morning of surgery) using Scrubcare or Hibiclens soap.    Use this soap only from the neck to your toes.     Leave the soap on your skin for one minute--then rinse thoroughly.      You may use your own shampoo and conditioner. No other hair products.   - Please remove all jewelry and body piercings.  - No lotions, deodorants or fragrance.  - No makeup or fingernail polish.   - Bring your ID and insurance card.    -If you use a CPAP machine, please bring the CPAP machine, tubing, and mask to hospital.    -If you have a Deep Brain Stimulator, Spinal Cord Stimulator, or any Neuro Stimulator device---you must bring the remote control to the hospital.      ALL PATIENTS GOING HOME THE SAME DAY OF SURGERY ARE REQUIRED TO HAVE A RESPONSIBLE ADULT TO DRIVE AND BE IN ATTENDANCE WITH THEM FOR 24 HOURS FOLLOWING SURGERY.    Covid testing policy as of 12/06/2022  Your surgeon will notify and schedule you for a COVID test if one is needed before surgery--please direct any questions or COVID symptoms to your surgeon      Questions or Concerns:    - For any questions regarding the day of surgery or your hospital stay, please contact the Pre Admission Nursing Office at [phone].       - If you have health changes between today and your surgery, please call your surgeon.       - For questions after surgery, please call your surgeons office.           Current Visitor Guidelines    You may have 2 visitors in the pre op area.    Visiting hours: 8 a.m. to 8:30 p.m.    Patients confirmed or  suspected to have symptoms of COVID 19 or flu:     No visitors allowed for adult patients.   Children (under age 18) can have 1 named visitor.     People who are sick or showing symptoms of COVID 19 or flu:    Are not allowed to visit patients--we can only make exceptions in special situations.       Please follow these guidelines for your visit:          Please maintain social distance          Masking is optional--however at times you may be asked to wear a mask for the safety of yourself and others     Clean your hands with alcohol hand . Do this when you arrive at and leave the building and patient room,    And again after you touch your mask or anything in the room.     Go directly to and from the room you are visiting.     Stay in the patient s room during your visit. Limit going to other places in the hospital as much as possible     Leave bags and jackets at home or in the car.     For everyone s health, please don t come and go during your visit. That includes for smoking   during your visit.

## 2024-05-02 ENCOUNTER — TEAM CONFERENCE (OUTPATIENT)
Dept: SURGERY | Facility: CLINIC | Age: 79
End: 2024-05-02
Payer: COMMERCIAL

## 2024-05-02 NOTE — PROGRESS NOTES
COLON AND RECTAL SURGERY HUDDLE:    Patient was reviewed in preparation for their surgery the following was reviewed and has been completed:    Surgeon: Dr. Justin Dela Cruz    Surgery & Date: 5/14   PLAN  1.  Schedule full labs including tumor markers including CEA, CA 19-9, and .  2.  Schedule diagnostic laparoscopy, peritoneal biopsies, and possible appendectomy.  I would only perform the appendectomy if it is clear that there is minimal at peritoneal involvement.  This is mainly to establish the diagnosis and if he does have peritoneal involvement fully establish the extent of it with a PCI.  He will need PAC and labs as above.     Last MD Note: reviewed    Anesthesia Type: General    Other Providers: No    PAC: Yes    WOC: No    Labs: Yes    Bowel Prep: Yes MiraLAX / Gatorade , Antibiotic, and Magnesium Citrate    Packet: Yes    Imaging: No    Post-Op Appointments: Yes    Pre op soap: Yes Questions about shower: no    Is patient on TPN?: No   If yes, I contacted the TPN pharmacist by paging the  pharmacy at [phone] or calling [phone]. I also contacted Lucia PERALTA with inpatient colon and rectal team.     Pre op call complete: Yes

## 2024-05-12 LAB
ABO/RH(D): ABNORMAL
ANTIBODY SCREEN: POSITIVE
SPECIMEN EXPIRATION DATE: ABNORMAL

## 2024-05-13 ENCOUNTER — LAB (OUTPATIENT)
Dept: LAB | Facility: CLINIC | Age: 79
End: 2024-05-13
Payer: COMMERCIAL

## 2024-05-13 DIAGNOSIS — C18.1 MALIGNANT NEOPLASM OF APPENDIX (H): ICD-10-CM

## 2024-05-13 DIAGNOSIS — Z01.818 PREOP EXAMINATION: ICD-10-CM

## 2024-05-13 PROCEDURE — 86850 RBC ANTIBODY SCREEN: CPT

## 2024-05-13 PROCEDURE — 86901 BLOOD TYPING SEROLOGIC RH(D): CPT

## 2024-05-13 PROCEDURE — 86900 BLOOD TYPING SEROLOGIC ABO: CPT

## 2024-05-13 PROCEDURE — 36415 COLL VENOUS BLD VENIPUNCTURE: CPT

## 2024-05-14 ENCOUNTER — ANESTHESIA (OUTPATIENT)
Dept: SURGERY | Facility: CLINIC | Age: 79
End: 2024-05-14
Payer: MEDICARE

## 2024-05-14 ENCOUNTER — HOSPITAL ENCOUNTER (OUTPATIENT)
Facility: CLINIC | Age: 79
Setting detail: OBSERVATION
Discharge: HOME | End: 2024-05-15
Attending: COLON & RECTAL SURGERY | Admitting: COLON & RECTAL SURGERY
Payer: MEDICARE

## 2024-05-14 DIAGNOSIS — C78.6 PSEUDOMYXOMA PERITONEI (H): Primary | ICD-10-CM

## 2024-05-14 LAB
ANION GAP SERPL CALCULATED.3IONS-SCNC: 11 MMOL/L (ref 7–15)
BLD PROD TYP BPU: NORMAL
BLOOD COMPONENT TYPE: NORMAL
BUN SERPL-MCNC: 19.5 MG/DL (ref 8–23)
CALCIUM SERPL-MCNC: 8.4 MG/DL (ref 8.8–10.2)
CHLORIDE SERPL-SCNC: 105 MMOL/L (ref 98–107)
CODING SYSTEM: NORMAL
CREAT SERPL-MCNC: 0.86 MG/DL (ref 0.67–1.17)
CROSSMATCH: NORMAL
DEPRECATED HCO3 PLAS-SCNC: 23 MMOL/L (ref 22–29)
EGFRCR SERPLBLD CKD-EPI 2021: 88 ML/MIN/1.73M2
ERYTHROCYTE [DISTWIDTH] IN BLOOD BY AUTOMATED COUNT: 13.8 % (ref 10–15)
GLUCOSE SERPL-MCNC: 177 MG/DL (ref 70–99)
HCT VFR BLD AUTO: 37.9 % (ref 40–53)
HGB BLD-MCNC: 12.4 G/DL (ref 13.3–17.7)
MAGNESIUM SERPL-MCNC: 1.9 MG/DL (ref 1.7–2.3)
MCH RBC QN AUTO: 30.5 PG (ref 26.5–33)
MCHC RBC AUTO-ENTMCNC: 32.7 G/DL (ref 31.5–36.5)
MCV RBC AUTO: 93 FL (ref 78–100)
PHOSPHATE SERPL-MCNC: 3.3 MG/DL (ref 2.5–4.5)
PLATELET # BLD AUTO: 237 10E3/UL (ref 150–450)
POTASSIUM SERPL-SCNC: 4.1 MMOL/L (ref 3.4–5.3)
RBC # BLD AUTO: 4.07 10E6/UL (ref 4.4–5.9)
SODIUM SERPL-SCNC: 139 MMOL/L (ref 135–145)
TROPONIN T SERPL HS-MCNC: 6 NG/L
UNIT ABO/RH: NORMAL
UNIT NUMBER: NORMAL
UNIT STATUS: NORMAL
UNIT TYPE ISBT: 6200
WBC # BLD AUTO: 11.1 10E3/UL (ref 4–11)

## 2024-05-14 PROCEDURE — 250N000013 HC RX MED GY IP 250 OP 250 PS 637: Performed by: COLON & RECTAL SURGERY

## 2024-05-14 PROCEDURE — 80048 BASIC METABOLIC PNL TOTAL CA: CPT

## 2024-05-14 PROCEDURE — 710N000010 HC RECOVERY PHASE 1, LEVEL 2, PER MIN: Performed by: COLON & RECTAL SURGERY

## 2024-05-14 PROCEDURE — 44970 LAPAROSCOPY APPENDECTOMY: CPT | Mod: GC | Performed by: COLON & RECTAL SURGERY

## 2024-05-14 PROCEDURE — 999N000141 HC STATISTIC PRE-PROCEDURE NURSING ASSESSMENT: Performed by: COLON & RECTAL SURGERY

## 2024-05-14 PROCEDURE — 250N000011 HC RX IP 250 OP 636: Performed by: NURSE ANESTHETIST, CERTIFIED REGISTERED

## 2024-05-14 PROCEDURE — 250N000011 HC RX IP 250 OP 636: Mod: JZ | Performed by: COLON & RECTAL SURGERY

## 2024-05-14 PROCEDURE — 88304 TISSUE EXAM BY PATHOLOGIST: CPT | Mod: 26 | Performed by: PATHOLOGY

## 2024-05-14 PROCEDURE — G0378 HOSPITAL OBSERVATION PER HR: HCPCS

## 2024-05-14 PROCEDURE — 86922 COMPATIBILITY TEST ANTIGLOB: CPT | Performed by: ANESTHESIOLOGY

## 2024-05-14 PROCEDURE — 250N000013 HC RX MED GY IP 250 OP 250 PS 637

## 2024-05-14 PROCEDURE — 258N000003 HC RX IP 258 OP 636

## 2024-05-14 PROCEDURE — 36415 COLL VENOUS BLD VENIPUNCTURE: CPT

## 2024-05-14 PROCEDURE — 250N000011 HC RX IP 250 OP 636: Performed by: ANESTHESIOLOGY

## 2024-05-14 PROCEDURE — 272N000001 HC OR GENERAL SUPPLY STERILE: Performed by: COLON & RECTAL SURGERY

## 2024-05-14 PROCEDURE — 250N000011 HC RX IP 250 OP 636

## 2024-05-14 PROCEDURE — 250N000024 HC ISOFLURANE, PER MIN: Performed by: COLON & RECTAL SURGERY

## 2024-05-14 PROCEDURE — 83735 ASSAY OF MAGNESIUM: CPT

## 2024-05-14 PROCEDURE — 44970 LAPAROSCOPY APPENDECTOMY: CPT | Performed by: ANESTHESIOLOGY

## 2024-05-14 PROCEDURE — 85027 COMPLETE CBC AUTOMATED: CPT

## 2024-05-14 PROCEDURE — 258N000003 HC RX IP 258 OP 636: Performed by: NURSE ANESTHETIST, CERTIFIED REGISTERED

## 2024-05-14 PROCEDURE — 99100 ANES PT EXTEME AGE<1 YR&>70: CPT | Performed by: ANESTHESIOLOGY

## 2024-05-14 PROCEDURE — 93005 ELECTROCARDIOGRAM TRACING: CPT | Mod: XU

## 2024-05-14 PROCEDURE — 370N000017 HC ANESTHESIA TECHNICAL FEE, PER MIN: Performed by: COLON & RECTAL SURGERY

## 2024-05-14 PROCEDURE — 88304 TISSUE EXAM BY PATHOLOGIST: CPT | Mod: TC | Performed by: COLON & RECTAL SURGERY

## 2024-05-14 PROCEDURE — 258N000003 HC RX IP 258 OP 636: Performed by: ANESTHESIOLOGY

## 2024-05-14 PROCEDURE — 250N000009 HC RX 250

## 2024-05-14 PROCEDURE — 84484 ASSAY OF TROPONIN QUANT: CPT

## 2024-05-14 PROCEDURE — 250N000011 HC RX IP 250 OP 636: Performed by: COLON & RECTAL SURGERY

## 2024-05-14 PROCEDURE — 84100 ASSAY OF PHOSPHORUS: CPT

## 2024-05-14 PROCEDURE — 360N000076 HC SURGERY LEVEL 3, PER MIN: Performed by: COLON & RECTAL SURGERY

## 2024-05-14 PROCEDURE — 250N000009 HC RX 250: Performed by: NURSE ANESTHETIST, CERTIFIED REGISTERED

## 2024-05-14 RX ORDER — NALOXONE HYDROCHLORIDE 0.4 MG/ML
0.2 INJECTION, SOLUTION INTRAMUSCULAR; INTRAVENOUS; SUBCUTANEOUS
Status: DISCONTINUED | OUTPATIENT
Start: 2024-05-14 | End: 2024-05-14 | Stop reason: HOSPADM

## 2024-05-14 RX ORDER — IBUPROFEN 200 MG
600 TABLET ORAL
Status: CANCELLED | OUTPATIENT
Start: 2024-05-14

## 2024-05-14 RX ORDER — FENTANYL CITRATE 50 UG/ML
25 INJECTION, SOLUTION INTRAMUSCULAR; INTRAVENOUS EVERY 5 MIN PRN
Status: DISCONTINUED | OUTPATIENT
Start: 2024-05-14 | End: 2024-05-14 | Stop reason: HOSPADM

## 2024-05-14 RX ORDER — AMOXICILLIN 250 MG
1 CAPSULE ORAL 2 TIMES DAILY PRN
Status: DISCONTINUED | OUTPATIENT
Start: 2024-05-14 | End: 2024-05-15

## 2024-05-14 RX ORDER — METHOCARBAMOL 500 MG/1
500 TABLET, FILM COATED ORAL 4 TIMES DAILY PRN
Qty: 30 TABLET | Refills: 1 | Status: ON HOLD | OUTPATIENT
Start: 2024-05-14 | End: 2024-06-24

## 2024-05-14 RX ORDER — OXYCODONE HYDROCHLORIDE 5 MG/1
5 TABLET ORAL EVERY 4 HOURS PRN
Status: DISCONTINUED | OUTPATIENT
Start: 2024-05-14 | End: 2024-05-15

## 2024-05-14 RX ORDER — NALOXONE HYDROCHLORIDE 0.4 MG/ML
0.2 INJECTION, SOLUTION INTRAMUSCULAR; INTRAVENOUS; SUBCUTANEOUS
Status: DISCONTINUED | OUTPATIENT
Start: 2024-05-14 | End: 2024-05-15 | Stop reason: HOSPADM

## 2024-05-14 RX ORDER — BUPIVACAINE HYDROCHLORIDE 2.5 MG/ML
INJECTION, SOLUTION EPIDURAL; INFILTRATION; INTRACAUDAL
Status: COMPLETED | OUTPATIENT
Start: 2024-05-14 | End: 2024-05-14

## 2024-05-14 RX ORDER — HYDRALAZINE HYDROCHLORIDE 20 MG/ML
2.5-5 INJECTION INTRAMUSCULAR; INTRAVENOUS EVERY 10 MIN PRN
Status: DISCONTINUED | OUTPATIENT
Start: 2024-05-14 | End: 2024-05-14

## 2024-05-14 RX ORDER — GRANISETRON HYDROCHLORIDE 1 MG/ML
1 INJECTION INTRAVENOUS ONCE
Status: COMPLETED | OUTPATIENT
Start: 2024-05-14 | End: 2024-05-14

## 2024-05-14 RX ORDER — NALOXONE HYDROCHLORIDE 0.4 MG/ML
0.4 INJECTION, SOLUTION INTRAMUSCULAR; INTRAVENOUS; SUBCUTANEOUS
Status: DISCONTINUED | OUTPATIENT
Start: 2024-05-14 | End: 2024-05-14 | Stop reason: HOSPADM

## 2024-05-14 RX ORDER — AMOXICILLIN 250 MG
2 CAPSULE ORAL 2 TIMES DAILY PRN
Status: DISCONTINUED | OUTPATIENT
Start: 2024-05-14 | End: 2024-05-15

## 2024-05-14 RX ORDER — HYDROMORPHONE HCL IN WATER/PF 6 MG/30 ML
0.4 PATIENT CONTROLLED ANALGESIA SYRINGE INTRAVENOUS EVERY 5 MIN PRN
Status: DISCONTINUED | OUTPATIENT
Start: 2024-05-14 | End: 2024-05-14

## 2024-05-14 RX ORDER — ASPIRIN 81 MG/1
81 TABLET ORAL AT BEDTIME
Status: CANCELLED | OUTPATIENT
Start: 2024-05-15

## 2024-05-14 RX ORDER — FENTANYL CITRATE 50 UG/ML
25 INJECTION, SOLUTION INTRAMUSCULAR; INTRAVENOUS
Status: CANCELLED | OUTPATIENT
Start: 2024-05-14

## 2024-05-14 RX ORDER — NITROGLYCERIN 0.4 MG/1
0.4 TABLET SUBLINGUAL EVERY 5 MIN PRN
Status: DISCONTINUED | OUTPATIENT
Start: 2024-05-14 | End: 2024-05-15 | Stop reason: HOSPADM

## 2024-05-14 RX ORDER — METRONIDAZOLE 500 MG/100ML
500 INJECTION, SOLUTION INTRAVENOUS
Status: COMPLETED | OUTPATIENT
Start: 2024-05-14 | End: 2024-05-14

## 2024-05-14 RX ORDER — PROPOFOL 10 MG/ML
INJECTION, EMULSION INTRAVENOUS PRN
Status: DISCONTINUED | OUTPATIENT
Start: 2024-05-14 | End: 2024-05-14

## 2024-05-14 RX ORDER — DEXAMETHASONE SODIUM PHOSPHATE 4 MG/ML
INJECTION, SOLUTION INTRA-ARTICULAR; INTRALESIONAL; INTRAMUSCULAR; INTRAVENOUS; SOFT TISSUE PRN
Status: DISCONTINUED | OUTPATIENT
Start: 2024-05-14 | End: 2024-05-14

## 2024-05-14 RX ORDER — DEXMEDETOMIDINE HYDROCHLORIDE 4 UG/ML
INJECTION, SOLUTION INTRAVENOUS
Status: COMPLETED | OUTPATIENT
Start: 2024-05-14 | End: 2024-05-14

## 2024-05-14 RX ORDER — ONDANSETRON 2 MG/ML
4 INJECTION INTRAMUSCULAR; INTRAVENOUS EVERY 30 MIN PRN
Status: DISCONTINUED | OUTPATIENT
Start: 2024-05-14 | End: 2024-05-14 | Stop reason: HOSPADM

## 2024-05-14 RX ORDER — CEFAZOLIN SODIUM/WATER 2 G/20 ML
2 SYRINGE (ML) INTRAVENOUS SEE ADMIN INSTRUCTIONS
Status: DISCONTINUED | OUTPATIENT
Start: 2024-05-14 | End: 2024-05-14 | Stop reason: HOSPADM

## 2024-05-14 RX ORDER — FENTANYL CITRATE 50 UG/ML
25-50 INJECTION, SOLUTION INTRAMUSCULAR; INTRAVENOUS
Status: DISCONTINUED | OUTPATIENT
Start: 2024-05-14 | End: 2024-05-14 | Stop reason: HOSPADM

## 2024-05-14 RX ORDER — HYDROMORPHONE HCL IN WATER/PF 6 MG/30 ML
0.2 PATIENT CONTROLLED ANALGESIA SYRINGE INTRAVENOUS EVERY 4 HOURS PRN
Status: DISCONTINUED | OUTPATIENT
Start: 2024-05-14 | End: 2024-05-15 | Stop reason: HOSPADM

## 2024-05-14 RX ORDER — OXYCODONE HYDROCHLORIDE 5 MG/1
5 TABLET ORAL
Status: DISCONTINUED | OUTPATIENT
Start: 2024-05-14 | End: 2024-05-15

## 2024-05-14 RX ORDER — CEFAZOLIN SODIUM/WATER 2 G/20 ML
2 SYRINGE (ML) INTRAVENOUS
Status: COMPLETED | OUTPATIENT
Start: 2024-05-14 | End: 2024-05-14

## 2024-05-14 RX ORDER — ONDANSETRON 2 MG/ML
4 INJECTION INTRAMUSCULAR; INTRAVENOUS EVERY 30 MIN PRN
Status: CANCELLED | OUTPATIENT
Start: 2024-05-14

## 2024-05-14 RX ORDER — KETOROLAC TROMETHAMINE 30 MG/ML
15 INJECTION, SOLUTION INTRAMUSCULAR; INTRAVENOUS ONCE
Status: COMPLETED | OUTPATIENT
Start: 2024-05-14 | End: 2024-05-14

## 2024-05-14 RX ORDER — FENTANYL CITRATE 50 UG/ML
INJECTION, SOLUTION INTRAMUSCULAR; INTRAVENOUS PRN
Status: DISCONTINUED | OUTPATIENT
Start: 2024-05-14 | End: 2024-05-14

## 2024-05-14 RX ORDER — EPHEDRINE SULFATE 50 MG/ML
INJECTION, SOLUTION INTRAMUSCULAR; INTRAVENOUS; SUBCUTANEOUS PRN
Status: DISCONTINUED | OUTPATIENT
Start: 2024-05-14 | End: 2024-05-14

## 2024-05-14 RX ORDER — LIDOCAINE 40 MG/G
CREAM TOPICAL
Status: DISCONTINUED | OUTPATIENT
Start: 2024-05-14 | End: 2024-05-14 | Stop reason: HOSPADM

## 2024-05-14 RX ORDER — METHOCARBAMOL 500 MG/1
500 TABLET, FILM COATED ORAL 3 TIMES DAILY PRN
Status: DISCONTINUED | OUTPATIENT
Start: 2024-05-14 | End: 2024-05-15 | Stop reason: HOSPADM

## 2024-05-14 RX ORDER — ONDANSETRON 4 MG/1
4 TABLET, ORALLY DISINTEGRATING ORAL EVERY 30 MIN PRN
Status: CANCELLED | OUTPATIENT
Start: 2024-05-14

## 2024-05-14 RX ORDER — HYDROMORPHONE HCL IN WATER/PF 6 MG/30 ML
0.2 PATIENT CONTROLLED ANALGESIA SYRINGE INTRAVENOUS EVERY 5 MIN PRN
Status: DISCONTINUED | OUTPATIENT
Start: 2024-05-14 | End: 2024-05-14 | Stop reason: HOSPADM

## 2024-05-14 RX ORDER — ROSUVASTATIN CALCIUM 5 MG/1
5 TABLET, COATED ORAL AT BEDTIME
Status: DISCONTINUED | OUTPATIENT
Start: 2024-05-14 | End: 2024-05-15 | Stop reason: HOSPADM

## 2024-05-14 RX ORDER — NALOXONE HYDROCHLORIDE 0.4 MG/ML
0.1 INJECTION, SOLUTION INTRAMUSCULAR; INTRAVENOUS; SUBCUTANEOUS
Status: CANCELLED | OUTPATIENT
Start: 2024-05-14

## 2024-05-14 RX ORDER — ONDANSETRON 2 MG/ML
4 INJECTION INTRAMUSCULAR; INTRAVENOUS EVERY 6 HOURS PRN
Status: DISCONTINUED | OUTPATIENT
Start: 2024-05-14 | End: 2024-05-15 | Stop reason: HOSPADM

## 2024-05-14 RX ORDER — NALOXONE HYDROCHLORIDE 0.4 MG/ML
0.4 INJECTION, SOLUTION INTRAMUSCULAR; INTRAVENOUS; SUBCUTANEOUS
Status: DISCONTINUED | OUTPATIENT
Start: 2024-05-14 | End: 2024-05-15 | Stop reason: HOSPADM

## 2024-05-14 RX ORDER — NALOXONE HYDROCHLORIDE 0.4 MG/ML
0.1 INJECTION, SOLUTION INTRAMUSCULAR; INTRAVENOUS; SUBCUTANEOUS
Status: DISCONTINUED | OUTPATIENT
Start: 2024-05-14 | End: 2024-05-14 | Stop reason: HOSPADM

## 2024-05-14 RX ORDER — OXYCODONE HYDROCHLORIDE 10 MG/1
10 TABLET ORAL EVERY 4 HOURS PRN
Status: DISCONTINUED | OUTPATIENT
Start: 2024-05-14 | End: 2024-05-15

## 2024-05-14 RX ORDER — SODIUM CHLORIDE, SODIUM LACTATE, POTASSIUM CHLORIDE, CALCIUM CHLORIDE 600; 310; 30; 20 MG/100ML; MG/100ML; MG/100ML; MG/100ML
INJECTION, SOLUTION INTRAVENOUS CONTINUOUS
Status: DISCONTINUED | OUTPATIENT
Start: 2024-05-14 | End: 2024-05-14 | Stop reason: HOSPADM

## 2024-05-14 RX ORDER — IBUPROFEN 600 MG/1
600 TABLET, FILM COATED ORAL EVERY 6 HOURS PRN
Qty: 30 TABLET | Refills: 0 | Status: ON HOLD | OUTPATIENT
Start: 2024-05-14 | End: 2024-06-24

## 2024-05-14 RX ORDER — ACETAMINOPHEN 325 MG/1
650 TABLET ORAL EVERY 4 HOURS PRN
Qty: 50 TABLET | Refills: 0 | Status: ON HOLD | OUTPATIENT
Start: 2024-05-14 | End: 2024-06-24

## 2024-05-14 RX ORDER — ONDANSETRON 4 MG/1
4 TABLET, ORALLY DISINTEGRATING ORAL EVERY 30 MIN PRN
Status: DISCONTINUED | OUTPATIENT
Start: 2024-05-14 | End: 2024-05-14 | Stop reason: HOSPADM

## 2024-05-14 RX ORDER — FENTANYL CITRATE 50 UG/ML
50 INJECTION, SOLUTION INTRAMUSCULAR; INTRAVENOUS EVERY 5 MIN PRN
Status: DISCONTINUED | OUTPATIENT
Start: 2024-05-14 | End: 2024-05-14

## 2024-05-14 RX ORDER — LABETALOL HYDROCHLORIDE 5 MG/ML
10 INJECTION, SOLUTION INTRAVENOUS
Status: DISCONTINUED | OUTPATIENT
Start: 2024-05-14 | End: 2024-05-14

## 2024-05-14 RX ORDER — DEXAMETHASONE SODIUM PHOSPHATE 10 MG/ML
INJECTION, SOLUTION INTRAMUSCULAR; INTRAVENOUS
Status: COMPLETED | OUTPATIENT
Start: 2024-05-14 | End: 2024-05-14

## 2024-05-14 RX ORDER — MEPERIDINE HYDROCHLORIDE 25 MG/ML
12.5 INJECTION INTRAMUSCULAR; INTRAVENOUS; SUBCUTANEOUS EVERY 5 MIN PRN
Status: DISCONTINUED | OUTPATIENT
Start: 2024-05-14 | End: 2024-05-14

## 2024-05-14 RX ORDER — ESMOLOL HYDROCHLORIDE 10 MG/ML
INJECTION INTRAVENOUS PRN
Status: DISCONTINUED | OUTPATIENT
Start: 2024-05-14 | End: 2024-05-14

## 2024-05-14 RX ORDER — ACETAMINOPHEN 325 MG/1
975 TABLET ORAL EVERY 6 HOURS
Status: DISCONTINUED | OUTPATIENT
Start: 2024-05-14 | End: 2024-05-15 | Stop reason: HOSPADM

## 2024-05-14 RX ORDER — ONDANSETRON 4 MG/1
4 TABLET, ORALLY DISINTEGRATING ORAL EVERY 6 HOURS PRN
Status: DISCONTINUED | OUTPATIENT
Start: 2024-05-14 | End: 2024-05-15 | Stop reason: HOSPADM

## 2024-05-14 RX ORDER — DEXAMETHASONE SODIUM PHOSPHATE 4 MG/ML
4 INJECTION, SOLUTION INTRA-ARTICULAR; INTRALESIONAL; INTRAMUSCULAR; INTRAVENOUS; SOFT TISSUE
Status: CANCELLED | OUTPATIENT
Start: 2024-05-14

## 2024-05-14 RX ORDER — ACETAMINOPHEN 325 MG/1
975 TABLET ORAL ONCE
Status: COMPLETED | OUTPATIENT
Start: 2024-05-14 | End: 2024-05-14

## 2024-05-14 RX ORDER — METHOCARBAMOL 750 MG/1
750 TABLET, FILM COATED ORAL
Status: CANCELLED | OUTPATIENT
Start: 2024-05-14

## 2024-05-14 RX ORDER — OXYCODONE HYDROCHLORIDE 5 MG/1
5 TABLET ORAL EVERY 6 HOURS PRN
Qty: 16 TABLET | Refills: 0 | Status: SHIPPED | OUTPATIENT
Start: 2024-05-14 | End: 2024-05-15

## 2024-05-14 RX ORDER — EZETIMIBE 10 MG/1
10 TABLET ORAL AT BEDTIME
Status: DISCONTINUED | OUTPATIENT
Start: 2024-05-14 | End: 2024-05-15 | Stop reason: HOSPADM

## 2024-05-14 RX ORDER — SODIUM CHLORIDE, SODIUM LACTATE, POTASSIUM CHLORIDE, CALCIUM CHLORIDE 600; 310; 30; 20 MG/100ML; MG/100ML; MG/100ML; MG/100ML
INJECTION, SOLUTION INTRAVENOUS CONTINUOUS
Status: DISCONTINUED | OUTPATIENT
Start: 2024-05-14 | End: 2024-05-14

## 2024-05-14 RX ORDER — ENOXAPARIN SODIUM 100 MG/ML
40 INJECTION SUBCUTANEOUS
Status: COMPLETED | OUTPATIENT
Start: 2024-05-14 | End: 2024-05-14

## 2024-05-14 RX ORDER — DEXAMETHASONE SODIUM PHOSPHATE 4 MG/ML
4 INJECTION, SOLUTION INTRA-ARTICULAR; INTRALESIONAL; INTRAMUSCULAR; INTRAVENOUS; SOFT TISSUE
Status: DISCONTINUED | OUTPATIENT
Start: 2024-05-14 | End: 2024-05-14

## 2024-05-14 RX ORDER — SODIUM CHLORIDE, SODIUM LACTATE, POTASSIUM CHLORIDE, CALCIUM CHLORIDE 600; 310; 30; 20 MG/100ML; MG/100ML; MG/100ML; MG/100ML
INJECTION, SOLUTION INTRAVENOUS CONTINUOUS PRN
Status: DISCONTINUED | OUTPATIENT
Start: 2024-05-14 | End: 2024-05-14

## 2024-05-14 RX ORDER — KETOROLAC TROMETHAMINE 30 MG/ML
15 INJECTION, SOLUTION INTRAMUSCULAR; INTRAVENOUS EVERY 6 HOURS PRN
Status: DISCONTINUED | OUTPATIENT
Start: 2024-05-14 | End: 2024-05-15

## 2024-05-14 RX ORDER — CELECOXIB 200 MG/1
200 CAPSULE ORAL ONCE
Status: COMPLETED | OUTPATIENT
Start: 2024-05-14 | End: 2024-05-14

## 2024-05-14 RX ORDER — FLUMAZENIL 0.1 MG/ML
0.2 INJECTION, SOLUTION INTRAVENOUS
Status: DISCONTINUED | OUTPATIENT
Start: 2024-05-14 | End: 2024-05-14 | Stop reason: HOSPADM

## 2024-05-14 RX ADMIN — SODIUM CHLORIDE, POTASSIUM CHLORIDE, SODIUM LACTATE AND CALCIUM CHLORIDE 1000 ML: 600; 310; 30; 20 INJECTION, SOLUTION INTRAVENOUS at 20:40

## 2024-05-14 RX ADMIN — CELECOXIB 200 MG: 200 CAPSULE ORAL at 13:14

## 2024-05-14 RX ADMIN — PHENYLEPHRINE HYDROCHLORIDE 100 MCG: 10 INJECTION INTRAVENOUS at 16:57

## 2024-05-14 RX ADMIN — ACETAMINOPHEN 975 MG: 325 TABLET, FILM COATED ORAL at 19:24

## 2024-05-14 RX ADMIN — PHENYLEPHRINE HYDROCHLORIDE 100 MCG: 10 INJECTION INTRAVENOUS at 16:55

## 2024-05-14 RX ADMIN — Medication 2 G: at 16:28

## 2024-05-14 RX ADMIN — FENTANYL CITRATE 50 MCG: 50 INJECTION INTRAMUSCULAR; INTRAVENOUS at 17:04

## 2024-05-14 RX ADMIN — SODIUM CHLORIDE, POTASSIUM CHLORIDE, SODIUM LACTATE AND CALCIUM CHLORIDE: 600; 310; 30; 20 INJECTION, SOLUTION INTRAVENOUS at 12:37

## 2024-05-14 RX ADMIN — PROPOFOL 80 MG: 10 INJECTION, EMULSION INTRAVENOUS at 16:35

## 2024-05-14 RX ADMIN — PROPOFOL 20 MG: 10 INJECTION, EMULSION INTRAVENOUS at 16:38

## 2024-05-14 RX ADMIN — ESMOLOL HYDROCHLORIDE 30 MG: 10 INJECTION, SOLUTION INTRAVENOUS at 16:36

## 2024-05-14 RX ADMIN — EZETIMIBE 10 MG: 10 TABLET ORAL at 23:24

## 2024-05-14 RX ADMIN — ACETAMINOPHEN 975 MG: 325 TABLET, FILM COATED ORAL at 23:23

## 2024-05-14 RX ADMIN — DEXMEDETOMIDINE 40 MCG: 100 INJECTION, SOLUTION, CONCENTRATE INTRAVENOUS at 13:28

## 2024-05-14 RX ADMIN — EPHEDRINE SULFATE 5 MG: 5 INJECTION INTRAVENOUS at 17:47

## 2024-05-14 RX ADMIN — ROSUVASTATIN CALCIUM 5 MG: 5 TABLET, FILM COATED ORAL at 23:24

## 2024-05-14 RX ADMIN — Medication 30 MG: at 17:09

## 2024-05-14 RX ADMIN — ENOXAPARIN SODIUM 40 MG: 40 INJECTION SUBCUTANEOUS at 12:24

## 2024-05-14 RX ADMIN — FENTANYL CITRATE 50 MCG: 50 INJECTION, SOLUTION INTRAMUSCULAR; INTRAVENOUS at 13:23

## 2024-05-14 RX ADMIN — FENTANYL CITRATE 50 MCG: 50 INJECTION INTRAMUSCULAR; INTRAVENOUS at 18:13

## 2024-05-14 RX ADMIN — GRANISETRON HYDROCHLORIDE 1 MG: 1 INJECTION, SOLUTION INTRAVENOUS at 16:03

## 2024-05-14 RX ADMIN — SODIUM CHLORIDE, POTASSIUM CHLORIDE, SODIUM LACTATE AND CALCIUM CHLORIDE: 600; 310; 30; 20 INJECTION, SOLUTION INTRAVENOUS at 16:27

## 2024-05-14 RX ADMIN — FOSAPREPITANT 150 MG: 150 INJECTION, POWDER, LYOPHILIZED, FOR SOLUTION INTRAVENOUS at 13:41

## 2024-05-14 RX ADMIN — BUPIVACAINE HYDROCHLORIDE 60 ML: 2.5 INJECTION, SOLUTION EPIDURAL; INFILTRATION; INTRACAUDAL; PERINEURAL at 13:28

## 2024-05-14 RX ADMIN — METRONIDAZOLE 500 MG: 500 INJECTION, SOLUTION INTRAVENOUS at 12:32

## 2024-05-14 RX ADMIN — Medication 50 MG: at 16:35

## 2024-05-14 RX ADMIN — EPHEDRINE SULFATE 5 MG: 5 INJECTION INTRAVENOUS at 17:19

## 2024-05-14 RX ADMIN — ESMOLOL HYDROCHLORIDE 30 MG: 10 INJECTION, SOLUTION INTRAVENOUS at 16:44

## 2024-05-14 RX ADMIN — HYDROMORPHONE HYDROCHLORIDE 0.2 MG: 0.2 INJECTION, SOLUTION INTRAMUSCULAR; INTRAVENOUS; SUBCUTANEOUS at 21:13

## 2024-05-14 RX ADMIN — DEXAMETHASONE SODIUM PHOSPHATE 6 MG: 4 INJECTION, SOLUTION INTRA-ARTICULAR; INTRALESIONAL; INTRAMUSCULAR; INTRAVENOUS; SOFT TISSUE at 16:43

## 2024-05-14 RX ADMIN — KETOROLAC TROMETHAMINE 15 MG: 30 INJECTION INTRAMUSCULAR; INTRAVENOUS at 20:19

## 2024-05-14 RX ADMIN — EPHEDRINE SULFATE 5 MG: 5 INJECTION INTRAVENOUS at 16:59

## 2024-05-14 RX ADMIN — DEXAMETHASONE SODIUM PHOSPHATE 2 MG: 10 INJECTION, SOLUTION INTRAMUSCULAR; INTRAVENOUS at 13:28

## 2024-05-14 ASSESSMENT — ACTIVITIES OF DAILY LIVING (ADL)
ADLS_ACUITY_SCORE: 26
ADLS_ACUITY_SCORE: 27
ADLS_ACUITY_SCORE: 26

## 2024-05-14 NOTE — BRIEF OP NOTE
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    Procedure: LAPAROSCOPY, DIAGNOSTIC, with peritineal cytology and appendectomy, N/A - Abdomen    Surgeon: Surgeons and Role:     * Justin Dela Cruz MD - Primary     * Elian Moore MD - Resident - Assisting     * Brad Ashton MD - Fellow - Assisting  Anesthesia: General   Estimated Blood Loss: 5cc    Drains: None  Specimens:   ID Type Source Tests Collected by Time Destination   1 : peritoneal fluid Peritoneal Fluid Peritoneum NON-GYNECOLOGIC CYTOLOGY Justin Dela Cruz MD 5/14/2024  5:18 PM    2 : appendix Tissue Appendix SURGICAL PATHOLOGY EXAM Justin Dela Cruz MD 5/14/2024  5:36 PM      Findings:   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 Normal appendiceal base. Pool of mucin in the RLQ. Otherwise no evidence of peritoneal carcinomatosis with only <5mm mucin deposits on the R hemidiaphragm.    Complications: None.  Implants: * No implants in log *      Brad Ashton MD, MS  Fellow, Colon & Rectal Surgery  [facility]  05/14/2024  6:06 PM

## 2024-05-14 NOTE — ANESTHESIA CARE TRANSFER NOTE
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    Anesthesia Type:   General     Note:    Oropharynx: oropharynx clear of all foreign objects and spontaneously breathing  Level of Consciousness: awake  Oxygen Supplementation: nasal cannula  Level of Supplemental Oxygen (L/min / FiO2): 3  Independent Airway: airway patency satisfactory and stable  Dentition: dentition unchanged    Report to RN Given: handoff report given  Patient transferred to: PACU    Handoff Report: Identifed the Patient, Identified the Reponsible Provider, Reviewed the pertinent medical history, Discussed the surgical course, Reviewed Intra-OP anesthesia mangement and issues during anesthesia, Set expectations for post-procedure period and Allowed opportunity for questions and acknowledgement of understanding  Vitals:  Vitals Value Taken Time   BP     Temp     Pulse     Resp     SpO2         Electronically Signed By: LISA Denny CRNA  May 14, 2024  6:12 PM

## 2024-05-14 NOTE — OR NURSING
TAP block performed without complications.  VSS.  Pt tolerated well. 50 mcg of Fentanyl given.  Will continue to monitor.

## 2024-05-14 NOTE — ANESTHESIA PREPROCEDURE EVALUATION
Anesthesia Pre-Procedure Evaluation    Patient: Juan Delarosa   MRN: [id number] : 1945        Procedure : Procedure(s):  LAPAROSCOPY, DIAGNOSTIC, laparoscopic appendectomy          Past Medical History:   Diagnosis Date    Ascending aorta dilation (H24)     CAD (coronary artery disease)     Concussion     Gastroesophageal reflux disease     HLD (hyperlipidemia)     HTN (hypertension)     Kidney stone     Malignant neoplasm of appendix (H)     PONV (postoperative nausea and vomiting)     Pseudomyxoma peritonei (H)     Vertigo       Past Surgical History:   Procedure Laterality Date    COLONOSCOPY      mulitple    IR MISCELLANEOUS PROCEDURE  2006    IR MISCELLANEOUS PROCEDURE  2006    KNEE SURGERY Bilateral     LUMBAR DISCECTOMY      PERCUTANEOUS NEPHROSTOMY      SHOULDER SURGERY        Allergies   Allergen Reactions    Blood Transfusion Related (Informational Only)      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  A delay in compatible RBCs may occur.    Gabapentin Dizziness      Social History     Tobacco Use    Smoking status: Never    Smokeless tobacco: Never   Substance Use Topics    Alcohol use: Never      Wt Readings from Last 1 Encounters:   24 90.6 kg (199 lb 11.8 oz)        Anesthesia Evaluation   Pt has had prior anesthetic.     History of anesthetic complications (hx of slow to wake up. Also has vertigo with positioning (BPPV))  - PONV.      ROS/MED HX  ENT/Pulmonary:  - neg pulmonary ROS     Neurologic: Comment: BPPV      Cardiovascular: Comment: Ascending Ao (4.8cm)    TTE 2023:  Interpretation Summary     There is mild concentric left ventricular hypertrophy.  The visual ejection fraction is 50-55%.  There is mild inferior wall hypokinesis.  The right ventricle is normal in size and function.  No significant valve disease.  Borderline to mild aortic root enlargement. Moderate to severe ascending  aortic dilation measuring 4.8  cm in diameter.  ______________________________________________________________________________  Left Ventricle  The left ventricle is normal in size. There is mild concentric left  ventricular hypertrophy. The visual ejection fraction is 50-55%. There is mild  inferior wall hypokinesis.     Right Ventricle  The right ventricle is normal in size and function.     Atria  The left atrium is mild to moderately dilated. Right atrial size is normal.  Intact atrial septum.     Mitral Valve  Mitral valve leaflets appear normal. There is trace to mild mitral  regurgitation.     Tricuspid Valve  Tricuspid valve leaflets appear normal. There is trace to mild tricuspid  regurgitation. The right ventricular systolic pressure is approximated at  34mmHg plus the right atrial pressure.     Aortic Valve  The aortic valve is trileaflet with aortic valve sclerosis. There is trace  aortic regurgitation. No aortic stenosis is present.     Pulmonic Valve  Normal pulmonic valve. There is trace pulmonic valvular regurgitation. Right  ventricular diastolic pressure is approximated at 7mmHg plus the right atrial  pressure.     Vessels  Borderline to mild aortic root enlargement. Moderate to severe ascending  aortic dilation measuring 4.8 cm in diameter. IVC diameter <2.1 cm collapsing  >50% with sniff suggests a normal RA pressure of 3 mmHg.     Pericardium  There is no pericardial effusion.      (+) Dyslipidemia hypertension- -  CAD -  - stent-2020.  Drug Eluting Stent.                                    METS/Exercise Tolerance: 4 - Raking leaves, gardening    Hematologic:       Musculoskeletal:       GI/Hepatic: Comment: Appendix abnormality      Renal/Genitourinary:       Endo:       Psychiatric/Substance Use:       Infectious Disease:       Malignancy:       Other:            Physical Exam    Airway        Mallampati: III   TM distance: > 3 FB   Neck ROM: full   Mouth opening: > 3 cm    Respiratory Devices and Support         Dental    "  Comment: Patient reports no loose or chipped teeth        Cardiovascular          Rhythm and rate: regular and normal     Pulmonary           breath sounds clear to auscultation           OUTSIDE LABS:  CBC:   Lab Results   Component Value Date    WBC 6.8 04/18/2024    HGB 15.8 04/18/2024    HCT 47.5 04/18/2024     04/18/2024     BMP:   Lab Results   Component Value Date     04/18/2024    POTASSIUM 4.9 04/18/2024    CHLORIDE 103 04/18/2024    CO2 28 04/18/2024    BUN 17.7 04/18/2024    CR 0.99 04/18/2024    GLC 99 04/18/2024     COAGS: No results found for: \"PTT\", \"INR\", \"FIBR\"  POC: No results found for: \"BGM\", \"HCG\", \"HCGS\"  HEPATIC:   Lab Results   Component Value Date    ALBUMIN 4.5 04/18/2024    PROTTOTAL 7.9 04/18/2024    ALT 17 04/18/2024    AST 24 04/18/2024    ALKPHOS 75 04/18/2024    BILITOTAL 0.5 04/18/2024     OTHER:   Lab Results   Component Value Date    LYN 9.8 04/18/2024       Anesthesia Plan    ASA Status:  3    NPO Status:  NPO Appropriate    Anesthesia Type: General.     - Airway: ETT              Consents    Anesthesia Plan(s) and associated risks, benefits, and realistic alternatives discussed. Questions answered and patient/representative(s) expressed understanding.     - Discussed:     - Discussed with:  Patient      - Extended Intubation/Ventilatory Support Discussed: No.      - Patient is DNR/DNI Status: No     Use of blood products discussed: Yes.     - Discussed with: Patient.     - Consented: consented to blood products     Postoperative Care       PONV prophylaxis: Background Propofol Infusion, Aprepitant, Dexamethasone or Solumedrol, Ondansetron (or other 5HT-3)     Comments:               Jethro Guaman MD    I have reviewed the pertinent notes and labs in the chart from the past 30 days and (re)examined the patient.  Any updates or changes from those notes are reflected in this note.             # Drug Induced Platelet Defect: home medication list includes an antiplatelet " "medication  # Obesity: Estimated body mass index is 30.37 kg/m  as calculated from the following:    Height as of this encounter: 1.727 m (5' 8\").    Weight as of this encounter: 90.6 kg (199 lb 11.8 oz).      "

## 2024-05-14 NOTE — OR NURSING
Dr. Rivera notified that patient has become hypotensive and has heart rates in the upper 40's. MD will notify attending, per MD run LR open.

## 2024-05-14 NOTE — ANESTHESIA PROCEDURE NOTES
Airway       Patient location during procedure: OR       Procedure Start/Stop Times: 5/14/2024 4:42 PM  Staff -        CRNA: Albert Doyle APRN CRNA       Other Anesthesia Staff: Mili Yap       Performed By: SRNA  Consent for Airway        Urgency: elective  Indications and Patient Condition       Indications for airway management: staci-procedural       Induction type:intravenous       Mask difficulty assessment: 1 - vent by mask    Final Airway Details       Final airway type: endotracheal airway       Successful airway: ETT - single  Endotracheal Airway Details        ETT size (mm): 7.5       Cuffed: yes       Cuff volume (mL): 7       Successful intubation technique: direct laryngoscopy       DL Blade Type: Montero 2       Grade View of Cords: 2       Adjucts: stylet       Position: Right       Measured from: gums/teeth       Secured at (cm): 23       Bite block used: None    Post intubation assessment        Placement verified by: capnometry, equal breath sounds and chest rise        Number of attempts at approach: 2       Secured with: tape       Ease of procedure: easy       Dentition: Intact and Unchanged    Medication(s) Administered   Medication Administration Time: 5/14/2024 4:42 PM    Additional Comments       Very large epiglottis, with glottic opening leftward.

## 2024-05-14 NOTE — ANESTHESIA PROCEDURE NOTES
TAP Procedure Note    Pre-Procedure   Staff -        Anesthesiologist:  Juan Henriquez MD       Resident/Fellow: Sreedhar Rivera MD       Performed By: resident       Location: pre-op       Pre-Anesthestic Checklist: patient identified, IV checked, site marked, risks and benefits discussed, informed consent, monitors and equipment checked, pre-op evaluation, at physician/surgeon's request and post-op pain management  Timeout:       Correct Patient: Yes        Correct Procedure: Yes        Correct Site: Yes        Correct Position: Yes        Correct Laterality: Yes        Site Marked: Yes  Procedure Documentation  Procedure: TAP       Diagnosis: POST OPERATIVE PAIN       Laterality: bilateral       Patient Position: supine       Patient Prep/Sterile Barriers: sterile gloves, mask       Skin prep: Chloraprep       Needle Type: short bevel       Needle Gauge: 21.        Needle Length (millimeters): 110        Ultrasound guided       1. Ultrasound was used to identify targeted nerve, plexus, vascular marker, or fascial plane and place a needle adjacent to it in real-time.       2. Ultrasound was used to visualize the spread of anesthetic in close proximity to the above referenced structure.       3. A permanent image is entered into the patient's record.    Assessment/Narrative         The placement was negative for: blood aspirated, painful injection and site bleeding       Paresthesias: No.       Bolus given via needle..        Secured via.        Insertion/Infusion Method: Single Shot       Complications: none       Injection made incrementally with aspirations every 5 mL.    Medication(s) Administered   Bupivacaine 0.25% PF (Infiltration) - Infiltration   60 mL - 5/14/2024 1:28:00 PM  Dexamethasone 10 mg/mL PF (Perineural) - Perineural   2 mg - 5/14/2024 1:28:00 PM  Dexmedetomidine 4 mcg/mL (Perineural) - Perineural   40 mcg - 5/14/2024 1:28:00 PM    FOR [facility] ([facility]) ONLY:   Pain Team  "Contact information: please page the Pain Team Via [facility]. Search \"Pain\". During daytime hours, please page the attending first. At night please page the resident first.      "

## 2024-05-14 NOTE — OR NURSING
Dr. Guaman notified that patients blood pressure has not continued to drop and is at 86/60 with heart rates in the upper 40's. MD to come to bedside. MD came to bedside and assessed patient. Per MD, goal is for MAP greater than 65.

## 2024-05-14 NOTE — OP NOTE
PREOPERATIVE DIAGNOSIS:  1.  Cystic neoplasm of the appendix.  2.  Coronary artery disease.  3.  Hypertension.  4.  Dyslipidemia.  5.  ECOG score 0.    PROCEDURE:  Diagnostic laparoscopy with peritoneal cytology.  Laparoscopic appendectomy.    ANESTHESIA: General endotracheal anesthesia plus bilateral TAP blocks.      SURGEON:  Justin Dela Cruz M.D.    ASSISTANTS: Brad Ashton M.D. (colon and rectal surgery fellow); Elian Moore MD (general surgery resident).    General risks related to abdominal surgery were reviewed with the patient. These include, but are not limited to, death, myocardial infarction, pneumonia, urinary tract infection, deep venous thrombosis with or without pulmonary embolus, abdominal infection from bowel injury or abscess, fistula, anastomotic leak that may require reoperation and stoma, ureteral injury, urinary dysfunction, sexual dysfunction, bowel obstruction, wound infection, and bleeding.    OPERATIVE PROCEDURE: After obtaining form consent, patient brought to the operating room placed in the supine position.  General endotracheal anesthesia was gently induced without difficulty.  The patient received appropriate preoperative antibiotic prophylaxis as well as mechanical and chemical DVT prophylaxis.  Bilateral lower extremity meta compression devices were applied and all pressure points were cushioned.  The patient also underwent preoperative placement of bilateral tap blocks.  A Sanchez catheter was inserted.  The abdomen was prepped and draped in the standard fashion.  A timeout was performed.  A 12 mm supraumbilical incision was placed and the peritoneal cavity was entered under direct visualization.  A 12 mm balloontipped trocar was then placed at this incision and pneumoperitoneum was established up to 15 mmHg without difficulty.  The peritoneal cavity was then assessed with a 10 mm 30 degree and a laparoscope and there was no evidence of organ injury or bleeding.  Additional  trocars were placed at the left lower abdomen (12 mm x 1 and 5 mm x 1).  These were both placed under direct visualization without difficulty.  After this, the entire peritoneal Cavity was assessed.  There was a 8 cm cystic neoplasm of the tip of the appendix that appeared to have ruptured and there is quite a bit of adeno mucinosis mainly in the pelvis and right lower quadrant.  We immediately suctioned out the mucinous appearing fluid and sent this for peritoneal cytology.  We then established the peritoneal cancer index by examining all quadrants in the abdomen and running the entire small bowel from the ileocecal valve all the way to the ligament of Treitz.  There was no evidence of diffuse involvement of the small bowel serosa or mesentery.  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  After this, the patient was positioned and the small bowel was retracted to the left abdomen.  In order to establish a pathologic diagnosis, the appendix was easily mobilized and there was no evidence of involvement of the base of the appendix.  The mesoappendix was divided with a harmonic scalpel.  The base of the appendix was then transected quite easily with an Endo BRITTANY stapler (60 mm-blue load).  The specimen was then placed in an Endo Catch bag and removed from the 12 mm trocar site.  This was sent for permanent pathology.  The staple line on the cecum appeared to be hemostatic and intact.  There were no additional findings.  The left lower quadrant 12 mm trocar site was closed at the fascial level with a figure-of-eight 0 Vicryl suture using a PMI device.  Pneumoperitoneum was evacuated and all trocars removed under direct visualization.  Hemostasis was corroborated.  Instrument, sponge, needle counts were all correct as reported to me.  The supraumbilical trocar site was closed at the fascial level with 2 figure-of-eight 0 Vicryl sutures.  The  wounds were irrigated with dilute peroxide and skin incisions were reapproximated with running 4-0 Monocryl subcuticular sutures as well as Dermabond Prineo.  The patient tolerated procedure well.  The Sanchez catheter was removed at the end of the operation.    COMPLICATIONS: none, immediately.    ESTIMATED BLOOD LOSS: 5 mL.    REPLACEMENT:     - Crystalloid: 800 mL.     - Colloid: 0 mL.     - Blood products: None.    SPECIMEN(S): Peritoneal fluid and vermiform appendix.    DRAINS/TUBES: None.    OPERATIVE FINDINGS: Findings consistent with adenomucinosis and likely low-grade mucinous neoplasm of the appendix.  Peritoneal cancer index: 8.    DISPOSITION: PACU.    This procedure was not performed to treat colon cancer through resection    Justin Dela Cruz M.D., M.Sc.    Division of Colon & Rectal Surgery  [facility]  [phone] (p)  [phone] (o)    CC:  [facility] Surgery PASCUAL Ruiz PA Reggie Singh, MD Stannard, Mark D

## 2024-05-15 VITALS
HEIGHT: 68 IN | DIASTOLIC BLOOD PRESSURE: 64 MMHG | SYSTOLIC BLOOD PRESSURE: 112 MMHG | OXYGEN SATURATION: 95 % | WEIGHT: 199.74 LBS | TEMPERATURE: 98.1 F | HEART RATE: 68 BPM | BODY MASS INDEX: 30.27 KG/M2 | RESPIRATION RATE: 18 BRPM

## 2024-05-15 DIAGNOSIS — C78.6 PSEUDOMYXOMA PERITONEI (H): Primary | ICD-10-CM

## 2024-05-15 PROCEDURE — G0378 HOSPITAL OBSERVATION PER HR: HCPCS

## 2024-05-15 PROCEDURE — 250N000013 HC RX MED GY IP 250 OP 250 PS 637: Performed by: PHYSICIAN ASSISTANT

## 2024-05-15 PROCEDURE — 250N000011 HC RX IP 250 OP 636

## 2024-05-15 PROCEDURE — 96374 THER/PROPH/DIAG INJ IV PUSH: CPT

## 2024-05-15 PROCEDURE — 250N000013 HC RX MED GY IP 250 OP 250 PS 637

## 2024-05-15 RX ORDER — OXYCODONE HYDROCHLORIDE 5 MG/1
5 TABLET ORAL EVERY 6 HOURS PRN
Status: DISCONTINUED | OUTPATIENT
Start: 2024-05-15 | End: 2024-05-15 | Stop reason: HOSPADM

## 2024-05-15 RX ORDER — LOSARTAN POTASSIUM 25 MG/1
25 TABLET ORAL AT BEDTIME
COMMUNITY
Start: 2024-05-16

## 2024-05-15 RX ORDER — IBUPROFEN 200 MG
600 TABLET ORAL 3 TIMES DAILY
Status: DISCONTINUED | OUTPATIENT
Start: 2024-05-15 | End: 2024-05-15 | Stop reason: HOSPADM

## 2024-05-15 RX ORDER — OXYCODONE HYDROCHLORIDE 5 MG/1
2.5-5 TABLET ORAL EVERY 6 HOURS PRN
Qty: 16 TABLET | Refills: 0 | Status: SHIPPED | OUTPATIENT
Start: 2024-05-15 | End: 2024-05-29

## 2024-05-15 RX ADMIN — OXYCODONE HYDROCHLORIDE 2.5 MG: 5 TABLET ORAL at 08:45

## 2024-05-15 RX ADMIN — IBUPROFEN 600 MG: 600 TABLET, FILM COATED ORAL at 08:44

## 2024-05-15 RX ADMIN — ACETAMINOPHEN 975 MG: 325 TABLET, FILM COATED ORAL at 06:07

## 2024-05-15 RX ADMIN — HYDROMORPHONE HYDROCHLORIDE 0.2 MG: 0.2 INJECTION, SOLUTION INTRAMUSCULAR; INTRAVENOUS; SUBCUTANEOUS at 06:10

## 2024-05-15 ASSESSMENT — ACTIVITIES OF DAILY LIVING (ADL)
ADLS_ACUITY_SCORE: 27
ADLS_ACUITY_SCORE: 30
ADLS_ACUITY_SCORE: 27
ADLS_ACUITY_SCORE: 27
ADLS_ACUITY_SCORE: 30
ADLS_ACUITY_SCORE: 27
ADLS_ACUITY_SCORE: 30
ADLS_ACUITY_SCORE: 30

## 2024-05-15 NOTE — PLAN OF CARE
Goal Outcome Evaluation:   -vital signs normal or at patient baseline. Yes  -tolerating oral intake to maintain hydration. Yes  -adequate pain control on oral analgesics. Yes

## 2024-05-15 NOTE — PROGRESS NOTES
"Colorectal Surgery Brief Progress Note  Surgery Cross-Cover  Post Op Check    05/14/2024    Juan Delarosa is a 78 year old male with h/o malignant neoplasm of appendix, CAD, HTN, HLD now POD#0 s/p diagnostic laparoscopy with peritoneal cytology, appendectomy.    Patient seen at bedside, reports uncontrolled abdominal pain in the LLQ. Pain is about a 9/10, has only received tylenol and toradol. Patient wanted to avoid narcotics. Denies SOB, chest pain, or dizziness. Voiding small amounts. Blood pressure has been in the 80s systolic over the last 15 minutes, HR 60s.    /70   Pulse 67   Temp 97.4  F (36.3  C) (Oral)   Resp 16   Ht 1.727 m (5' 8\")   Wt 90.6 kg (199 lb 11.8 oz)   SpO2 99%   BMI 30.37 kg/m      Gen: AOx3, appears uncomfortable, with towel on head  Resp: breathing non-labored on 3L  Abdomen: soft, distended, tender to palpation with voluntary guarding in LLQ and LUQ, appropriate tenderness post-op in RLQ. Not peritonitic. No palpable hematoma.  Incisions: clean, dry, intact. Well-approximated. No fluid collections near incisions.   Extremities: warm and well perfused  Skin: warm, dry    A/P: Hypotension and uncontrolled pain post-op. Blood pressure improved with 1L IVF bolus, up to 103/67. Labs and EKG reassuring - WBC 11.1 (6.8), Hb 12.4 (15.8), both expected post-op. BMP normal, troponin 6. EKG with NSR. Went to bedside to re-evaluate patient, and he states that he is feeling better after toradol and IVF bolus. He looks much better than initially but would still benefit from admission to observation overnight for pain control.     - admit to obs  - S tylenol  - PRN oxycodone, dilaudid for breakthrough pain  - regular diet  - please page if patient becomes hypotensive and/or if pain is not controlled      Katerine Mccrary MD  Surgery PGY-1  Please see [facility] for on-call pager information    "

## 2024-05-15 NOTE — ANESTHESIA POSTPROCEDURE EVALUATION
Patient: Juan Delarosa    Procedure: Procedure(s):  LAPAROSCOPY, DIAGNOSTIC, with peritineal cytology and appendectomy       Anesthesia Type:  General    Note:  Disposition: Admission   Postop Pain Control: Uneventful            Sign Out: Well controlled pain   PONV: No   Neuro/Psych: Uneventful            Sign Out: Acceptable/Baseline neuro status   Airway/Respiratory: Uneventful            Sign Out: Acceptable/Baseline resp. status   CV/Hemodynamics: Uneventful            Sign Out: Acceptable CV status; No obvious hypovolemia; No obvious fluid overload   Other NRE: NONE   DID A NON-ROUTINE EVENT OCCUR? No           Last vitals:  Vitals Value Taken Time   /64 05/14/24 2057   Temp 36.4  C (97.5  F) 05/14/24 1924   Pulse 64 05/14/24 2058   Resp 17 05/14/24 2058   SpO2 98 % 05/14/24 2058   Vitals shown include unfiled device data.    Electronically Signed By: Diego Rivera MD  May 14, 2024  8:59 PM

## 2024-05-15 NOTE — DISCHARGE SUMMARY
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  Laparoscopic appendectomy.     ANESTHESIA: General endotracheal anesthesia plus bilateral TAP blocks.              Consultations:   None         Imaging Studies:     Results for orders placed or performed in visit on 23   Echocardiogram Complete     Value    LVEF  50-55%    Narrative    [id number]  [id number]  [id number]  [id number]^FINESSE^JEFRY^GUME                                                                       Version 2     [place] [address]     Name: ONEAL NG  MRN: [id number]  : 1945  Study Date: 2023 09:11 AM  Age: 77 yrs  Gender: Male  Patient Location: [facility]  Reason For Study: Aneurysm of thoracic aorta (H24)  Ordering Physician: JEFRY KILPATRICK  Referring Physician: JEFRY KILPATRICK  Performed By: Jaycee Carrizales RDCS     BSA: 2.1 m2  Height: 68 in  Weight: 204 lb  HR: 74  BP: 139/75 mmHg  ______________________________________________________________________________  Procedure  Complete Echo Adult. Definity (NDC #22782-515) given intravenously. Definity:  7 ml used,  3 ml wasted.  ______________________________________________________________________________  Interpretation Summary     There is mild concentric left ventricular hypertrophy.  The visual ejection fraction is 50-55%.  There is mild inferior wall hypokinesis.  The right ventricle is normal in size and function.  No significant valve disease.  Borderline to mild aortic root enlargement. Moderate to severe ascending  aortic dilation measuring 4.8 cm in diameter.  ______________________________________________________________________________  Left Ventricle  The left ventricle is normal in size. There is mild concentric left  ventricular hypertrophy. The visual ejection fraction is 50-55%. There is mild  inferior wall hypokinesis.     Right Ventricle  The right ventricle is normal in size and function.     Atria  The left atrium is mild to moderately dilated. Right atrial size is normal.  Intact atrial septum.     Mitral Valve  Mitral valve leaflets appear normal. There is trace to mild mitral  regurgitation.     Tricuspid Valve  Tricuspid valve leaflets appear normal. There is trace to mild tricuspid  regurgitation. The right ventricular systolic pressure is approximated at  34mmHg plus the right atrial pressure.     Aortic Valve  The aortic valve is trileaflet with aortic valve sclerosis. There is trace  aortic regurgitation. No aortic stenosis is present.     Pulmonic Valve  Normal pulmonic valve. There is trace pulmonic valvular regurgitation. Right  ventricular diastolic pressure is approximated at 7mmHg plus the right atrial  pressure.     Vessels  Borderline to mild aortic root enlargement. Moderate to severe ascending  aortic dilation measuring 4.8 cm in diameter. IVC diameter <2.1 cm collapsing  >50% with sniff suggests a normal RA pressure of 3 mmHg.     Pericardium  There is no pericardial effusion.     ______________________________________________________________________________  MMode/2D Measurements &  Calculations  RVDd: 4.1 cm  IVSd: 1.3 cm  LVIDd: 5.0 cm  LVIDs: 3.5 cm  LVPWd: 1.2 cm  FS: 29.6 %  LV mass(C)d: 248.8 grams  LV mass(C)dI: 120.7 grams/m2     Ao root diam: 3.9 cm  asc Aorta Diam: 4.8 cm  LVOT diam: 2.6 cm  LVOT area: 5.3 cm2  Ao root diam index Ht(cm/m): 2.3  Ao root diam index BSA (cm/m2): 1.9  Asc Ao diam index BSA (cm/m2): 2.3  Asc Ao diam index Ht(cm/m): 2.8  LA Volume (BP): 72.0 ml  LA Volume Index (BP): 35.0 ml/m2     LA Volume Indexed (AL/bp): 35.8 ml/m2  RWT: 0.47     Doppler Measurements & Calculations  MV E max alex: 38.7 cm/sec  MV A max alex: 72.3 cm/sec  MV E/A: 0.54  MV dec time: 0.27 sec  Ao V2 max: 120.7 cm/sec  Ao max P.0 mmHg  Ao V2 mean: 84.9 cm/sec  Ao mean PG: 3.3 mmHg  Ao V2 VTI: 26.9 cm  YAYO(I,D): 3.6 cm2  YAYO(V,D): 3.2 cm2  AI P1/2t: 1258 msec  LV V1 max P.2 mmHg  LV V1 max: 73.4 cm/sec  LV V1 VTI: 18.2 cm  SV(LVOT): 96.9 ml  SI(LVOT): 47.0 ml/m2     PI end-d alex: 129.0 cm/sec  TR max alex: 241.3 cm/sec  TR max P.3 mmHg  RVSP(TR): 33.3 mmHg  AV Alex Ratio (DI): 0.61  YAYO Index (cm2/m2): 1.7  E/E': 7.7  E/E' av.4  Lateral E/e': 4.9  Medial E/e': 7.8  Peak E' Alex: 5.0 cm/sec     ______________________________________________________________________________  Report approved by: Josephine Vogt 2023 03:21 PM                  Medications Prior to Admission:     Medications Prior to Admission   Medication Sig Dispense Refill Last Dose    aspirin 81 MG EC tablet Take 81 mg by mouth at bedtime   Past Month    Cholecalciferol (VITAMIN D) 50 MCG ( UT) CAPS Take 5,000 Units by mouth daily   Past Month    ezetimibe (ZETIA) 10 MG tablet Take 10 mg by mouth at bedtime   2024    losartan (COZAAR) 25 MG tablet Take 25 mg by mouth at bedtime   2024 at 2200    nitroGLYcerin (NITROSTAT) 0.4 MG sublingual tablet Place 0.4 mg under the tongue every 5 minutes as needed for chest pain   More than a month    rosuvastatin (CRESTOR) 5 MG tablet Take 5 mg by mouth at  bedtime   Past Week            Discharge Medications:     Current Discharge Medication List        START taking these medications    Details   acetaminophen (TYLENOL) 325 MG tablet Take 2 tablets (650 mg) by mouth every 4 hours as needed for mild pain  Qty: 50 tablet, Refills: 0    Associated Diagnoses: Pseudomyxoma peritonei (H)      ibuprofen (ADVIL/MOTRIN) 600 MG tablet Take 1 tablet (600 mg) by mouth every 6 hours as needed for other (mild and/or inflammatory pain)  Qty: 30 tablet, Refills: 0    Associated Diagnoses: Pseudomyxoma peritonei (H)      methocarbamol (ROBAXIN) 500 MG tablet Take 1 tablet (500 mg) by mouth 4 times daily as needed for muscle spasms  Qty: 30 tablet, Refills: 1    Associated Diagnoses: Pseudomyxoma peritonei (H)      oxyCODONE (ROXICODONE) 5 MG tablet Take 0.5-1 tablets (2.5-5 mg) by mouth every 6 hours as needed for moderate to severe pain  Qty: 16 tablet, Refills: 0    Associated Diagnoses: Pseudomyxoma peritonei (H)           CONTINUE these medications which have CHANGED    Details   losartan (COZAAR) 25 MG tablet Take 1 tablet (25 mg) by mouth at bedtime           CONTINUE these medications which have NOT CHANGED    Details   aspirin 81 MG EC tablet Take 81 mg by mouth at bedtime      Cholecalciferol (VITAMIN D) 50 MCG (2000 UT) CAPS Take 5,000 Units by mouth daily      ezetimibe (ZETIA) 10 MG tablet Take 10 mg by mouth at bedtime      nitroGLYcerin (NITROSTAT) 0.4 MG sublingual tablet Place 0.4 mg under the tongue every 5 minutes as needed for chest pain      rosuvastatin (CRESTOR) 5 MG tablet Take 5 mg by mouth at bedtime                    Brief History of Illness:   78 year old male PMH HTN, HLD, CAD s/p PCI with JO ANN on aspirin, known ascending aorta dilation found to have cystic neoplasm of the appendix.  Now s/p Diagnostic laparoscopy with peritoneal cytology and laparoscopic appendectomy on 5/14/2024, pt was admitted for observation post-op due to uncontrolled pain and  "hypotension.             Hospital Course:   Post-operatively pt was gently fluid resuscitated. Pt was given toradol and tylenol.  Pt is fearful of trying oxycodone and what effects it may have on him.  Pt was encouraged to try low dose oxycodone 2.5mg and this did not cause side effects although did not do much for pain.  Pt instructed to try oxycodone 5mg next.   Pt was also given scheduled tylenol, ibuprofen, robaxin.    Patient is to follow up in the [facility] and Rectal Surgery Clinic in 2-3 week with Kylie Wade NP or Eleonora Huerta PA-C and then with Dr. Dela Cruz in 3-4 weeks after.          Day of Discharge Physical Exam:   Blood pressure 94/64, pulse 62, temperature 97.6  F (36.4  C), temperature source Oral, resp. rate 16, height 1.727 m (5' 8\"), weight 90.6 kg (199 lb 11.8 oz), SpO2 96%.    Gen: AAOx3, NAD  Pulm: Non-labored breathing  Abd: Soft, appropriately tender, no guarding/rebound   Incision C/D/I   Ext:  Warm and well-perfused         Final Pathology Result:   Pending at time of discharge           Discharge Instructions and Follow-Up:     Discharge Procedure Orders   When to call - Contact Surgeon Team   Order Comments: You may experience symptoms that require follow-up before your scheduled appointment. Contact your Surgeon Team if you are concerned about pain control, large amount of bleeding, blood clots, constipation, or if you experience signs of infection (fever, growing tenderness at the surgery site, a large amount of drainage, severe pain, foul-smelling drainage, redness or swelling.     When to call - Reach out to Urgent Care   Order Comments: If you are experiencing uncontrolled Nausea and Vomiting, uncontrolled pain, inability to urinate and uncomfortable, and in need of immediate care, and you are NOT able to reach your Surgeon Team, go to an Urgent Care clinic. Do NOT go to the Emergency Room unless you have shortness of breath, chest pain, or other signs of a medical " emergency.     When to call - Reasons to Call 911   Order Comments: Call 911 immediately if you experience sudden-onset chest pain, arm weakness/numbness, slurred speech, or shortness of breath     Symptoms - Fever Management   Order Comments: A low grade fever can be expected after surgery. Your Provider many have prescribed an Opioid pain medication that also contains acetaminophen (TYLENOL) that may help with Fever management.  Do NOT take additional acetaminophen (TYLENOL) in combination with an Opioid/acetaminophen (TYLENOL) product. Read the labels on your Over The Counter (OTC) medications with care.     Symptoms - Reduced Urine Output   Order Comments: If it has been greater than 8 hours since you have urinated despite drinking plenty of water, call your Surgeon Team.     No driving or operating machinery   Order Comments: Do NOT drive any vehicle or operate mechanical equipment for 24 hours following the end of your surgery.  Even though you may feel normal, your reactions may be affected by Anesthesia medication you received.     No Alcohol   Order Comments: Do NOT drink alcoholic beverages for 24 hours following your surgery and while taking pain medications.     Diet Instructions   Order Comments: Follow your surgeon's orders for any diet restrictions.  If you did not receive any diet restrictions, you may drink clear liquids (apple juice, ginger ale, 7-up, broth, etc.), and progress to your regular diet as you feel able. It is important to stay well-hydrated after surgery and drink plenty of water.     Dressing / Wound Care - Wound   Order Comments: You have a clean dressing on your surgical wound. Dressing change instructions as follows: Keep incisions clean and dry.  Okay to shower the day after surgery.  Leave glue intact and do not peel off until seen in clinic for your postop visit.  Contact your Surgeon Team if you have increased redness, warmth around the surgical wound, and/or drainage from the  surgical wound.     Discharge Instructions - Comfort and Pain Management   Order Comments: Pain after surgery is normal and expected. You will have some amount of pain after surgery. Your pain will improve with time. There are several things you can do to help reduce your pain including: rest, ice, and using pain medications as needed. Use pain interventions and don't wait until pain level is out of control. Contact your Surgeon Team if you have pain that persists or worsens after surgery despite rest, ice, and taking your medication(s) as prescribed. You may have a dry mouth, a sore throat, muscles aches or trouble sleeping, and these symptoms should go away after 24 hours.     Discharge Instructions - Rest   Order Comments: Rest and relax for the next 24 hours. Make arrangements to have someone stay with you overnight, and avoid hazardous and strenuous activities.  Do NOT make any important decisions for the next 24 hours.     Shower/Bathing - Restrictions (specify)   Order Comments: Shower/Bathing - Restrictions (specify)   Okay to shower the day after surgery.  No baths or submerging surgical incisions for 1 month postop.     Order Specific Question Answer Comments   Is discharge order? Yes      No lifting   Order Comments: No lifting over 10 pounds and no strenuous physical activity for 6 weeks.     Order Specific Question Answer Comments   Is discharge order? Yes      May return to work (WITH restrictions)   Order Comments: May return to work in 2 week(s) with the following restrictions: No lifting more than 10 pounds for 6 weeks.     Order Specific Question Answer Comments   Is discharge order? Yes             Home Health Care:     Not needed           Discharge Disposition:     Discharged to home      Condition at discharge: Stable      Evelyn Romero PA-C ..................5/15/2024   7:51 AM  Colon and Rectal Surgery     Pt was seen and discussed with Dr. Ashton on 5/15/2024    The above plan of care was  performed and communicated to me by CRS Staff: Dr. Justin Dela Cruz     I spent 30 minute face-to-face or coordinating care of Juan Delarosa. Over 50% of our time on the unit was spent counseling the patient and/or coordinating care as documented in the assessment and plan.     [id number] post op hospital visit

## 2024-05-15 NOTE — PROVIDER NOTIFICATION
Patient's After Visit Summary was reviewed with patient and/or significant other.   Patient verbalized understanding of After Visit Summary, recommended follow up and was given an opportunity to ask questions.   Discharge medications sent home with patient/family: YES, No   Discharged with significant other

## 2024-05-15 NOTE — PROVIDER NOTIFICATION
"Via Vocera: \"There's a second bolus ordered for the pt. Would you like him to receive that ?\"    Provider Response: No just one is fine  Ordered obtained and d/c    "

## 2024-05-15 NOTE — PLAN OF CARE
Goal Outcome Evaluation:    Plan of Care Reviewed With: patient    Overall Patient Progress: improving      -vital signs normal or at patient baseline:In progress  -tolerating oral intake to maintain hydration:Met   -adequate pain control on oral analgesics:Met

## 2024-05-15 NOTE — PROGRESS NOTES
2015 pt looking pale and bp trending lower with sys in 70's, pt complain of flash of heat, paged ColoRectal surgical service, PAR doc made aware of situation. See eMAR for meds and fluid bolus. ColoRectal resident at bedside, Labs drawn, EKG completed. See flowsheets for vital trends.

## 2024-05-16 LAB
ATRIAL RATE - MUSE: 63 BPM
DIASTOLIC BLOOD PRESSURE - MUSE: NORMAL MMHG
INTERPRETATION ECG - MUSE: NORMAL
P AXIS - MUSE: 7 DEGREES
PR INTERVAL - MUSE: 178 MS
QRS DURATION - MUSE: 102 MS
QT - MUSE: 396 MS
QTC - MUSE: 405 MS
R AXIS - MUSE: -19 DEGREES
SYSTOLIC BLOOD PRESSURE - MUSE: NORMAL MMHG
T AXIS - MUSE: 99 DEGREES
VENTRICULAR RATE- MUSE: 63 BPM

## 2024-05-17 ENCOUNTER — TELEPHONE (OUTPATIENT)
Dept: SURGERY | Facility: CLINIC | Age: 79
End: 2024-05-17
Payer: COMMERCIAL

## 2024-05-17 ENCOUNTER — PATIENT OUTREACH (OUTPATIENT)
Dept: SURGERY | Facility: CLINIC | Age: 79
End: 2024-05-17
Payer: COMMERCIAL

## 2024-05-17 NOTE — TELEPHONE ENCOUNTER
Patient is scheduled for surgery with Dr. Justin Dela Cruz     Spoke with: Juan    Date of Surgery: Thurs 6/20/2024    Location: [facility] OR    Informed patient they will need an adult  (surgery admit)    Upcoming Related Appointments:     Weds May 29, 2024 11:00 AM  (Arrive by 10:45 AM)  Post Op with LISA Lassiter CNP  [facility] [phone]    [address] [place] [id number]      Weds May 29, 2024  1:00 PM  Pre-op Physical (H&P)  (Arrive by 12:45 PM) anesthesiology clinic  PAC EVALUATION with LISA Anders [facility] [place] [phone]    [address] [place] [id number]      Weds May 29, 2024  2:15 PM  LAB with  LAB  [facility] [place] ([facility] ) [phone]    [address] [place] [id number]      Mon Jun 17, 2024  3:00 PM  Discuss Surgery  (Arrive by 2:45 PM)  Return Patient with Justin Dela Cruz MD  [facility] [phone]    TELEPHONE VISIT     Mon Jul 08, 2024 10:30 AM  (Arrive by 10:15 AM)  Post Op with Eleonora Huerta PA-C  [facility] [phone]    [address] [place] [address]      Mon Aug 05, 2024  9:30 AM  (Arrive by 9:15 AM)  Post Op with Justin Dela Cruz MD  [facility] [phone]    [address] [place] [address]      Surgery packet: sending via [employer]     Additional comments:   - Perfusionist confirmed via email from Deon Barakat on 5/17/2024    Capri Blanton  [place]-op Coordinator  [facility]  Direct Phone: [phone]

## 2024-05-17 NOTE — TELEPHONE ENCOUNTER
Post Op Note     Called to check on patient postoperatively after hospital discharge.       Patient is s/p diagnostic lap with appendectomy with Dr. Justin Dela Cruz.   Admitted 5/14 and discharged on 5/15.    Pain is well controlled with tylenol, ibuprofen and robaxin  Patient is eating and drinking normally. Patient is on a regular diet.  Encouraged patient to drink 8-10 glasses of water a day.   Patient is passing flats, is having regular stools.   Patient does not require supplies.   Patient is voiding normally and urine is light in color.  Patient is not set up with home care.   Patient's pathology was not reviewed with them.   Patient Denies nausea and vomiting.  Patient Denies any fevers or chills.  Patient's incision is CDI. Patient reminded NOT to remove any dressings over their incisions.   10 inch x 5 inch bruise on left side of incision   Just notied last night   Warm but not swollen  No fevers.  He did restart his baby aspirin every other day. Started yesterday 5/16 at 9:00pm, bruise was already there before taking Aspirin.  Updated Dr. Dela Cruz and patient is sending photo to review.   Patient is on a activity restriction. Lifting 10 pounds for 6 weeks.   Patient Denies needing any forms completed.   Follow up is set up with Kylie Claros NP on 5/29 and with Dr. Justin Dela Cruz on 6/24.   Encouraged the patient to contact the clinic in the meantime with any fevers, chills, nausea, vomiting, increased colostomy output, no colostomy output, dizziness, lightheadedness, uncontrolled pain, changes to the incisions, or with any questions or concerns.    Patient's questions were answered to their stated satisfaction and they are in agreement with this plan.    DARNELL Wheeler [phone]  Colon & Rectal Surgery Clinic  [facility]

## 2024-05-20 LAB
PATH REPORT.COMMENTS IMP SPEC: ABNORMAL
PATH REPORT.COMMENTS IMP SPEC: ABNORMAL
PATH REPORT.COMMENTS IMP SPEC: YES
PATH REPORT.FINAL DX SPEC: ABNORMAL
PATH REPORT.GROSS SPEC: ABNORMAL
PATH REPORT.MICROSCOPIC SPEC OTHER STN: ABNORMAL
PATH REPORT.RELEVANT HX SPEC: ABNORMAL
PATHOLOGY SYNOPTIC REPORT: ABNORMAL
PHOTO IMAGE: ABNORMAL

## 2024-05-20 NOTE — TELEPHONE ENCOUNTER
FUTURE VISIT INFORMATION      SURGERY INFORMATION:  Date: 24  Location: [facility] or  Surgeon:  Justin Dela Cruz MD   Anesthesia Type:  general  Procedure: OPEN CYTOREDUCTIVE SURGERY, WITH HYPERTHERMIC INTRAPERITONEAL CHEMOTHERAPY     RECORDS REQUESTED FROM:       Primary Care Provider: Sean Melendrez MD     Most recent EKG+ Tracin24    Most recent Cardiac Stress Test: [facility] [employer]

## 2024-05-22 NOTE — PROGRESS NOTES
"Colon and Rectal Surgery Follow-up Telephone Note      RE: Juan Delarosa  : 1945  RIRI: 2024    Juan Delarosa is a 78 year old male who is being evaluated via a billable telephone visit.      The patient has been notified of following:     \"This telephone visit will be conducted via a call between you and your physician/provider. We have found that certain health care needs can be provided without the need for a physical exam.  This service lets us provide the care you need with a short phone conversation.  If a prescription is necessary we can send it directly to your pharmacy.  If lab work is needed we can place an order for that and you can then stop by our lab to have the test done at a later time.    If during the course of the call the physician/provider feels a telephone visit is not appropriate, you will not be charged for this service.\"     Patient has given verbal consent for telephone visit? Yes     DIAGNOSIS: 78 year old male with cystic neoplasm of the appendix who is now status post diagnostic laparoscopy with peritoneal cytology and laparoscopic appendectomy with PCI of 8 on 24.    INTERVAL HISTORY: Je is recovering well from his diagnostic laparoscopy and appendectomy.  He has no significant complaints at this time.  No major changes in his overall health.     ASSESSMENT  Doing well postop.  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 We discussed the role and impact of cytoreductive surgery with hyperthermic intraperitoneal chemotherapy for metastatic appendiceal cancer, including the potential need for several cycles of preoperative chemotherapy, intraoperative findings that may limit or preclude the extent of resection and delivery of intraperitoneal chemotherapy, postoperative complications and sequelae, likelihood of recurrent cancer, and quality of life. We also discussed recent grade IA data (Prodige 7 trial) showing no significant differences in " overall survival with cytoreductive surgery with hyperthermic intraperitoneal chemotherapy versus cytoreductive surgery alone. The patient understands these well and agrees to proceed. All questions were answered to his/her satisfaction.     Final Diagnosis   A. APPENDIX, APPENDECTOMY:  -Low-grade appendiceal mucinous neoplasm (LAMN)  -Proximal appendiceal margin is free of neoplastic involvement  -Transmural defect/perforation with serosal deposits of acellular mucin (also present at site of mesenteric margin)     PLAN  1.  eJ is already scheduled for open cytoreduction and hyperthermic intraperitoneal chemotherapy this Thursday.  He will need PAC, labs, and mechanical bowel prep with antibiotics.  `    20 minutes spent on the date of the encounter, discussing his diagnosis, workup, and future operative plan.      Justin Dela Cruz M.D., M.Sc.     Division of Colon and Rectal Surgery  [facility]    Referring Provider:  Kiet Mckeon MD     Primary Care Provider:  Sean Melendrez

## 2024-05-29 ENCOUNTER — PRE VISIT (OUTPATIENT)
Dept: SURGERY | Facility: CLINIC | Age: 79
End: 2024-05-29

## 2024-05-29 ENCOUNTER — LAB (OUTPATIENT)
Dept: LAB | Facility: CLINIC | Age: 79
End: 2024-05-29
Payer: COMMERCIAL

## 2024-05-29 ENCOUNTER — OFFICE VISIT (OUTPATIENT)
Dept: SURGERY | Facility: CLINIC | Age: 79
End: 2024-05-29
Payer: COMMERCIAL

## 2024-05-29 ENCOUNTER — ANESTHESIA EVENT (OUTPATIENT)
Dept: SURGERY | Facility: CLINIC | Age: 79
DRG: 330 | End: 2024-05-29
Payer: MEDICARE

## 2024-05-29 VITALS
RESPIRATION RATE: 16 BRPM | BODY MASS INDEX: 29.18 KG/M2 | DIASTOLIC BLOOD PRESSURE: 75 MMHG | TEMPERATURE: 97.8 F | WEIGHT: 197 LBS | HEIGHT: 69 IN | OXYGEN SATURATION: 99 % | SYSTOLIC BLOOD PRESSURE: 130 MMHG | HEART RATE: 62 BPM

## 2024-05-29 VITALS
HEART RATE: 68 BPM | SYSTOLIC BLOOD PRESSURE: 110 MMHG | BODY MASS INDEX: 29.25 KG/M2 | HEIGHT: 68 IN | WEIGHT: 193 LBS | DIASTOLIC BLOOD PRESSURE: 67 MMHG | OXYGEN SATURATION: 96 %

## 2024-05-29 DIAGNOSIS — Z01.818 PREOP EXAMINATION: Primary | ICD-10-CM

## 2024-05-29 DIAGNOSIS — C78.6 PSEUDOMYXOMA PERITONEI (H): ICD-10-CM

## 2024-05-29 DIAGNOSIS — R76.8 RED BLOOD CELL ANTIBODY POSITIVE: ICD-10-CM

## 2024-05-29 DIAGNOSIS — Z09 FOLLOW-UP EXAMINATION AFTER COLORECTAL SURGERY: Primary | ICD-10-CM

## 2024-05-29 LAB
ALBUMIN SERPL BCG-MCNC: 4.3 G/DL (ref 3.5–5.2)
ALP SERPL-CCNC: 77 U/L (ref 40–150)
ALT SERPL W P-5'-P-CCNC: 16 U/L (ref 0–70)
ANION GAP SERPL CALCULATED.3IONS-SCNC: 7 MMOL/L (ref 7–15)
AST SERPL W P-5'-P-CCNC: 23 U/L (ref 0–45)
BASOPHILS # BLD AUTO: 0 10E3/UL (ref 0–0.2)
BASOPHILS NFR BLD AUTO: 1 %
BILIRUB SERPL-MCNC: 0.4 MG/DL
BUN SERPL-MCNC: 24.9 MG/DL (ref 8–23)
CALCIUM SERPL-MCNC: 9.6 MG/DL (ref 8.8–10.2)
CHLORIDE SERPL-SCNC: 105 MMOL/L (ref 98–107)
CREAT SERPL-MCNC: 0.96 MG/DL (ref 0.67–1.17)
DEPRECATED HCO3 PLAS-SCNC: 29 MMOL/L (ref 22–29)
EGFRCR SERPLBLD CKD-EPI 2021: 80 ML/MIN/1.73M2
EOSINOPHIL # BLD AUTO: 0.2 10E3/UL (ref 0–0.7)
EOSINOPHIL NFR BLD AUTO: 2 %
ERYTHROCYTE [DISTWIDTH] IN BLOOD BY AUTOMATED COUNT: 14.6 % (ref 10–15)
GLUCOSE SERPL-MCNC: 81 MG/DL (ref 70–99)
HCT VFR BLD AUTO: 39 % (ref 40–53)
HGB BLD-MCNC: 12.9 G/DL (ref 13.3–17.7)
IMM GRANULOCYTES # BLD: 0 10E3/UL
IMM GRANULOCYTES NFR BLD: 0 %
LYMPHOCYTES # BLD AUTO: 1.8 10E3/UL (ref 0.8–5.3)
LYMPHOCYTES NFR BLD AUTO: 23 %
MCH RBC QN AUTO: 30.9 PG (ref 26.5–33)
MCHC RBC AUTO-ENTMCNC: 33.1 G/DL (ref 31.5–36.5)
MCV RBC AUTO: 93 FL (ref 78–100)
MONOCYTES # BLD AUTO: 0.9 10E3/UL (ref 0–1.3)
MONOCYTES NFR BLD AUTO: 11 %
NEUTROPHILS # BLD AUTO: 5 10E3/UL (ref 1.6–8.3)
NEUTROPHILS NFR BLD AUTO: 63 %
NRBC # BLD AUTO: 0 10E3/UL
NRBC BLD AUTO-RTO: 0 /100
PLATELET # BLD AUTO: 310 10E3/UL (ref 150–450)
POTASSIUM SERPL-SCNC: 4.9 MMOL/L (ref 3.4–5.3)
PROT SERPL-MCNC: 7.3 G/DL (ref 6.4–8.3)
RBC # BLD AUTO: 4.18 10E6/UL (ref 4.4–5.9)
SODIUM SERPL-SCNC: 141 MMOL/L (ref 135–145)
WBC # BLD AUTO: 7.8 10E3/UL (ref 4–11)

## 2024-05-29 PROCEDURE — 99215 OFFICE O/P EST HI 40 MIN: CPT | Mod: 24 | Performed by: CLINICAL NURSE SPECIALIST

## 2024-05-29 PROCEDURE — 99000 SPECIMEN HANDLING OFFICE-LAB: CPT | Performed by: PATHOLOGY

## 2024-05-29 PROCEDURE — 36415 COLL VENOUS BLD VENIPUNCTURE: CPT | Performed by: PATHOLOGY

## 2024-05-29 PROCEDURE — 99024 POSTOP FOLLOW-UP VISIT: CPT | Performed by: NURSE PRACTITIONER

## 2024-05-29 PROCEDURE — 80053 COMPREHEN METABOLIC PANEL: CPT | Performed by: PATHOLOGY

## 2024-05-29 PROCEDURE — 84134 ASSAY OF PREALBUMIN: CPT | Performed by: COLON & RECTAL SURGERY

## 2024-05-29 PROCEDURE — 85025 COMPLETE CBC W/AUTO DIFF WBC: CPT | Performed by: PATHOLOGY

## 2024-05-29 ASSESSMENT — ENCOUNTER SYMPTOMS
DYSRHYTHMIAS: 0
SEIZURES: 0

## 2024-05-29 ASSESSMENT — PAIN SCALES - GENERAL
PAINLEVEL: NO PAIN (0)
PAINLEVEL: MILD PAIN (2)

## 2024-05-29 ASSESSMENT — LIFESTYLE VARIABLES: TOBACCO_USE: 0

## 2024-05-29 NOTE — LETTER
"2024       RE: Juan Delarosa  [address]  [facility] [address]       Dear Colleague,    Thank you for referring your patient, Juan Delarosa, to the [facility] COLON AND RECTAL SURGERY CLIN[facility] [place] at [facility]. Please see a copy of my visit note below.    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 Dela Cruz with PCI of 8.    Final Diagnosis   A. APPENDIX, APPENDECTOMY:  -Low-grade appendiceal mucinous neoplasm (LAMN)  -Proximal appendiceal margin is free of neoplastic involvement  -Transmural defect/perforation with serosal deposits of acellular mucin (also present at site of mesenteric margin)     Interval history: Juan has been doing well.  No significant pain.  He is tolerating a regular diet and no nausea or vomiting.  No fevers or chills.  Having normal bowel movements.    Physical Examination:   /67 (BP Location: Right arm, Patient Position: Sitting, Cuff Size: Adult Large)   Pulse 68   Ht 1.727 m (5' 8\")   Wt 87.5 kg (193 lb)   SpO2 96%   BMI 29.35 kg/m    General: Alert, oriented, in no acute distress, sitting comfortably  HEENT: Mucous membranes moist  Abdomen: Soft, nondistended, nontender on palpation.  Incisions well-approximated without any erythema or drainage.    Assessment/Plan:  78 year old male status post diagnostic laparoscopy with peritoneal cytology and laparoscopic appendectomy on 24 by Dr. Dela Cruz with PCI of 8.  He is recovering quite well.  He is scheduled for HIPEC on  with Dr. Dela Cruz and will be meeting with him virtually before this.  No lifting more than 10 pounds for full 6 weeks from this surgery but can continue on a regular " diet.  Encouraged him to contact the clinic in the meantime with any questions or concerns. Patient's questions were answered to his stated satisfaction and he is in agreement with this plan.     Medical history:  Past Medical History:   Diagnosis Date    Ascending aorta dilation (H24)     CAD (coronary artery disease)     Concussion     Gastroesophageal reflux disease     HLD (hyperlipidemia)     HTN (hypertension)     Kidney stone     Malignant neoplasm of appendix (H)     PONV (postoperative nausea and vomiting)     Pseudomyxoma peritonei (H)     Vertigo        Surgical history:  Past Surgical History:   Procedure Laterality Date    COLONOSCOPY      mulitple    IR MISCELLANEOUS PROCEDURE  01/18/2006    IR MISCELLANEOUS PROCEDURE  01/18/2006    KNEE SURGERY Bilateral     LAPAROSCOPIC APPENDECTOMY N/A 5/14/2024    Procedure: LAPAROSCOPY, DIAGNOSTIC, with peritineal cytology and appendectomy;  Surgeon: Justin Dela Cruz MD;  Location: [facility] OR    LUMBAR DISCECTOMY      PERCUTANEOUS NEPHROSTOMY      SHOULDER SURGERY         Problem list:    Patient Active Problem List    Diagnosis Date Noted    Pseudomyxoma peritonei (H) 05/14/2024     Priority: Medium       Medications:  Current Outpatient Medications   Medication Sig Dispense Refill    aspirin 81 MG EC tablet Take 81 mg by mouth at bedtime      Cholecalciferol (VITAMIN D) 50 MCG (2000 UT) CAPS Take 5,000 Units by mouth daily      docusate sodium (COLACE) 50 MG capsule Take 50 mg by mouth 2 times daily      ezetimibe (ZETIA) 10 MG tablet Take 10 mg by mouth at bedtime      losartan (COZAAR) 25 MG tablet Take 1 tablet (25 mg) by mouth at bedtime      methocarbamol (ROBAXIN) 500 MG tablet Take 1 tablet (500 mg) by mouth 4 times daily as needed for muscle spasms 30 tablet 1    nitroGLYcerin (NITROSTAT) 0.4 MG sublingual tablet Place 0.4 mg under the tongue every 5 minutes as needed for chest pain      rosuvastatin (CRESTOR) 5 MG tablet Take 5 mg by mouth at  "bedtime      acetaminophen (TYLENOL) 325 MG tablet Take 2 tablets (650 mg) by mouth every 4 hours as needed for mild pain (Patient not taking: Reported on 5/29/2024) 50 tablet 0    ibuprofen (ADVIL/MOTRIN) 600 MG tablet Take 1 tablet (600 mg) by mouth every 6 hours as needed for other (mild and/or inflammatory pain) (Patient not taking: Reported on 5/29/2024) 30 tablet 0    oxyCODONE (ROXICODONE) 5 MG tablet Take 0.5-1 tablets (2.5-5 mg) by mouth every 6 hours as needed for moderate to severe pain (Patient not taking: Reported on 5/29/2024) 16 tablet 0       Allergies:  Allergies   Allergen Reactions    Blood Transfusion Related (Informational Only)      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  A delay in compatible RBCs may occur.    Gabapentin Dizziness       Family history:  Family History   Problem Relation Age of Onset    Diabetes Type 2  Sister     Diabetes Type 2  Sister     Diabetes Type 2  Sister     Diabetes Type 2  Brother     Diabetes Type 2  Brother     Diabetes Type 2  Brother     Diabetes Type 2  Brother     Diabetes Type 2  Brother     Diabetes Type 2  Brother     Stomach Cancer Maternal Grandmother     Anesthesia Reaction No family hx of     Clotting Disorder No family hx of     Bleeding Disorder No family hx of        Social history:  Social History     Tobacco Use    Smoking status: Never    Smokeless tobacco: Never   Substance Use Topics    Alcohol use: Never     Marital status: .    Nursing Notes:   Zoey Cottrell  5/29/2024 11:04 AM  Signed  Chief Complaint   Patient presents with    Post-Op - General Surgery     Post-op DOS 5/14/2024       Vitals:    05/29/24 1059   BP: 110/67   BP Location: Right arm   Patient Position: Sitting   Cuff Size: Adult Large   Pulse: 68   SpO2: 96%   Weight: 87.5 kg (193 lb)   Height: 1.727 m (5' 8\")       Body mass index is 29.35 kg/m .                          Zoey Cottrell, SATINDER       15 minutes spent on " the date of the encounter doing chart review, history and exam, documentation and further activities as noted above.   This is a postop visit.      This note was created using speech recognition software and may contain unintended word substitutions.          Again, thank you for allowing me to participate in the care of your patient.      Sincerely,    LISA Marte CNP

## 2024-05-29 NOTE — H&P
Pre-Operative H & P     CC:  Preoperative exam to assess for increased cardiopulmonary risk while undergoing surgery and anesthesia.    Date of Encounter: 5/29/2024  Primary Care Physician:  Sean Melendrez     Reason for visit:   Encounter Diagnoses   Name Primary?    Preop examination Yes    Pseudomyxoma peritonei (H)     Red blood cell antibody positive        HPI  Juan Delarosa is a 78 year old male who presents for pre-operative H & P in preparation for  Procedure Information       Case: [id number] Date/Time: 06/20/24 0730    Procedure: OPEN CYTOREDUCTIVE SURGERY, WITH HYPERTHERMIC INTRAPERITONEAL CHEMOTHERAPY (Abdomen)    Anesthesia type: General    Diagnosis: Pseudomyxoma peritonei (H) [C78.6]    Pre-op diagnosis: Pseudomyxoma peritonei (H) [C78.6]    Location:  OR  /  OR    Providers: Justin Dela Cruz MD          History is obtained from the patient, wife and chart review    Patient who has been followed by Dr. Dela Cruz and is s/p diagnostic laparotomy, with peritoneal cytology and appendectomy on 5/14/24. He was admitted for observation due to hypotension and pain control. His pathology revealed low-grade appendiceal mucinous neoplasm (LAMN); proximal appendiceal margin is free of neoplastic involvement, and transmural defect/perforation with serosal deposits of acellular mucin (also present at site of mesenteric margin). He has now been counseled for above procedures.     Patient's history is otherwise significant for HLD, HTN, CAD, s/p PCI to mid LCx, (mild-moderate CAD in distal LM, LAD, and RCA), dilated ascending aorta, GERD, kidney stone, and blood antibody.  For his cardiac issues he is followed by cardiology, last seen on 5/3/24 for cardiac preop evaluation for surgery.    Today patient denies fever, cough, shortness of breath, chest pain, or irregular HR. He is having some abdominal soreness but is walking a mile without difficulty.      Hx of abnormal bleeding or anti-platelet use:  ASA 81 mg every 48 hours      Past Medical History  Past Medical History:   Diagnosis Date    Ascending aorta dilation (H24)     CAD (coronary artery disease)     Concussion     Gastroesophageal reflux disease     HLD (hyperlipidemia)     HTN (hypertension)     Kidney stone     Malignant neoplasm of appendix (H)     PONV (postoperative nausea and vomiting)     Pseudomyxoma peritonei (H)     Vertigo        Past Surgical History  Past Surgical History:   Procedure Laterality Date    COLONOSCOPY      mulitple    IR MISCELLANEOUS PROCEDURE  01/18/2006    IR MISCELLANEOUS PROCEDURE  01/18/2006    KNEE SURGERY Bilateral     LAPAROSCOPIC APPENDECTOMY N/A 5/14/2024    Procedure: LAPAROSCOPY, DIAGNOSTIC, with peritineal cytology and appendectomy;  Surgeon: Justin Dela Cruz MD;  Location: [facility] OR    LUMBAR DISCECTOMY      PERCUTANEOUS NEPHROSTOMY      SHOULDER SURGERY         Prior to Admission Medications  Current Outpatient Medications   Medication Sig Dispense Refill    acetaminophen (TYLENOL) 325 MG tablet Take 2 tablets (650 mg) by mouth every 4 hours as needed for mild pain 50 tablet 0    aspirin 81 MG EC tablet Take 81 mg by mouth every 48 hours Bedtime      Cholecalciferol (VITAMIN D) 50 MCG (2000 UT) CAPS Take 5,000 Units by mouth at bedtime      docusate sodium (COLACE) 50 MG capsule Take 50 mg by mouth 3 times daily      ezetimibe (ZETIA) 10 MG tablet Take 10 mg by mouth at bedtime      ibuprofen (ADVIL/MOTRIN) 600 MG tablet Take 1 tablet (600 mg) by mouth every 6 hours as needed for other (mild and/or inflammatory pain) 30 tablet 0    losartan (COZAAR) 25 MG tablet Take 1 tablet (25 mg) by mouth at bedtime      methocarbamol (ROBAXIN) 500 MG tablet Take 1 tablet (500 mg) by mouth 4 times daily as needed for muscle spasms 30 tablet 1    nitroGLYcerin (NITROSTAT) 0.4 MG sublingual tablet Place 0.4 mg under the tongue every 5 minutes as needed for chest pain      rosuvastatin (CRESTOR) 5 MG tablet Take 5 mg by  mouth at bedtime      oxyCODONE (ROXICODONE) 5 MG tablet Take 0.5-1 tablets (2.5-5 mg) by mouth every 6 hours as needed for moderate to severe pain (Patient not taking: Reported on 5/29/2024) 16 tablet 0       Allergies  Allergies   Allergen Reactions    Blood Transfusion Related (Informational Only)      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  A delay in compatible RBCs may occur.    Gabapentin Dizziness       Social History  Social History     Socioeconomic History    Marital status:      Spouse name: Not on file    Number of children: Not on file    Years of education: Not on file    Highest education level: Not on file   Occupational History    Not on file   Tobacco Use    Smoking status: Never    Smokeless tobacco: Never   Substance and Sexual Activity    Alcohol use: Never    Drug use: Never    Sexual activity: Not on file   Other Topics Concern    Not on file   Social History Narrative    Not on file     Social Determinants of Health     Financial Resource Strain: Not on file   Food Insecurity: Not on file   Transportation Needs: Not on file   Physical Activity: Not on file   Stress: Not on file   Social Connections: Not on file   Interpersonal Safety: Not on file   Housing Stability: Not on file       Family History  Family History   Problem Relation Age of Onset    Diabetes Type 2  Sister     Diabetes Type 2  Sister     Diabetes Type 2  Sister     Diabetes Type 2  Brother     Diabetes Type 2  Brother     Diabetes Type 2  Brother     Diabetes Type 2  Brother     Diabetes Type 2  Brother     Diabetes Type 2  Brother     Stomach Cancer Maternal Grandmother     Anesthesia Reaction No family hx of     Clotting Disorder No family hx of     Bleeding Disorder No family hx of        Review of Systems  The complete review of systems is negative other than noted in the HPI or here.   Anesthesia Evaluation   Pt has had prior anesthetic. Type: General and MAC.     "No history of anesthetic complications       ROS/MED HX  ENT/Pulmonary:    (-) tobacco use and recent URI   Neurologic:    (-) no seizures and no CVA   Cardiovascular: Comment: Borderline to mild aortic root enlargement. Moderate to severe ascending  aortic dilation measuring 4.8 cm in diameter.      (+) Dyslipidemia hypertension- Peripheral Vascular Disease-- Other.  CAD -  - stent-2020. 1 Drug Eluting Stent. Taking blood thinners Pt has not received instructions: Instructions Given to patient: Planned 7 day hold for surgery.                            Previous cardiac testing   Echo: Date: 11/16/23 Results:    Stress Test:  Date: 12/4/23 Results:    ECG Reviewed:  Date: 5/14/24 Results:  Sinus rhythm  Low voltage QRS  Nonspecific T wave abnormality      Cath:  Date: 2020 Results:   (-) MANUEL and arrhythmias   METS/Exercise Tolerance: 4 - Raking leaves, gardening Comment: Currently walking a mile a day without exertional symptoms   Hematologic:     (+)       history of blood transfusion, no previous transfusion reaction, Known PRBC Anitbodies: Yes, - Anti-M,   (-) history of blood clots   Musculoskeletal:  - neg musculoskeletal ROS     GI/Hepatic:    (-) GERD   Renal/Genitourinary:     (+)       Nephrolithiasis ,       Endo:  - neg endo ROS     Psychiatric/Substance Use:  - neg psychiatric ROS  (-) psychiatric history   Infectious Disease:  - neg infectious disease ROS     Malignancy:   (+) Malignancy, History of GI.GI CA  Active status post Surgery.      Other:  - neg other ROS          /75 (BP Location: Right arm, Patient Position: Sitting, Cuff Size: Adult Large)   Pulse 62   Temp 97.8  F (36.6  C) (Oral)   Resp 16   Ht 1.74 m (5' 8.5\")   Wt 89.4 kg (197 lb)   SpO2 99%   BMI 29.52 kg/m      Physical Exam   Constitutional: Awake, alert, cooperative, no apparent distress, and appears stated age.  Eyes: Pupils equal, round and reactive to light, extra ocular muscles intact, sclera clear, conjunctiva " normal.  HENT: Normocephalic, oral pharynx with moist mucus membranes, good dentition. No goiter appreciated.   Respiratory: Clear to auscultation bilaterally, no crackles or wheezing. No cough or obvious dyspnea.  Cardiovascular: Regular rate and rhythm, normal S1 and S2, and no murmur noted.  Carotids +2, no bruits. No edema. Palpable pulses to radial  DP and PT arteries.   GI: Normal bowel sounds, soft, non-distended. No deep palpation due to tenderness. Surgical scars: healing well. He has some bruising at his left side, slowing resolving.   Lymph/Hematologic: No cervical lymphadenopathy and no supraclavicular lymphadenopathy.  Genitourinary:  Deferred.   Skin: Warm and dry.    Musculoskeletal: Full ROM of neck. There is no redness, warmth, or swelling of the joints. Gross motor strength is normal.    Neurologic: Awake, alert, oriented to name, place and time. Cranial nerves II-XII are grossly intact. Gait is normal.   Neuropsychiatric: Calm, cooperative. Normal affect.     Prior Labs/Diagnostic Studies   All labs and imaging personally reviewed     EK24 Sinus rhythm  Low voltage QRS  Nonspecific T wave abnormality    Exercise stress test 23     Summary   1. The patient exercised for 10 minutes and 10 seconds on the  Ellis protocol.    2. Excellent functional capacity for age.    3. The patient experienced dyspnea (moderate to severe) during the stress test.    4. Dyspnea resolved appropriately during recovery.    5. Hypertensive response to stress (peak 182/74 mmHg).    6. Ectopy consisted of frequent PVCs and occasional ventricular couplets during exercise and in recovery.    7. Negative stress ECG for ST segment depression.    8. Normal left ventricular systolic function with no regional wall motion abnormalities noted at rest.   LVEF 55%.    9. Post stress, no wall motion abnormalities seen.   LV systolic size decreases and systolic function increases appropriately.    10. No echocardiographic  "evidence of ischemia.    11. Mild aortic valve regurgitation.    12. The aortic root measures mildly dilated at 4.2 cm.    13. The proximal ascending aorta measures moderately dilated at 4.8 cm.    14. Compared to the prior TTE report from 10/14/22, the prox asc aorta has grown in size (4.6 -> 4.8 cm). AR now appears mild in severity (previously trace).         Echocardiogram 11/16/23   Interpretation Summary     There is mild concentric left ventricular hypertrophy.  The visual ejection fraction is 50-55%.  There is mild inferior wall hypokinesis.  The right ventricle is normal in size and function.  No significant valve disease.  Borderline to mild aortic root enlargement. Moderate to severe ascending  aortic dilation measuring 4.8 cm in diameter.    The patient's records and results personally reviewed by this provider.     Outside records reviewed from: Care Everywhere    LAB/DIAGNOSTIC STUDIES TODAY:    Lab Results   Component Value Date    WBC 7.8 05/29/2024     Lab Results   Component Value Date    RBC 4.18 05/29/2024     Lab Results   Component Value Date    HGB 12.9 05/29/2024     Lab Results   Component Value Date    HCT 39.0 05/29/2024     Lab Results   Component Value Date    MCV 93 05/29/2024     Lab Results   Component Value Date    MCH 30.9 05/29/2024     Lab Results   Component Value Date    MCHC 33.1 05/29/2024     Lab Results   Component Value Date    RDW 14.6 05/29/2024     Lab Results   Component Value Date     05/29/2024     Last Comprehensive Metabolic Panel:  Lab Results   Component Value Date     05/29/2024    POTASSIUM 4.9 05/29/2024    CHLORIDE 105 05/29/2024    CO2 29 05/29/2024    ANIONGAP 7 05/29/2024    GLC 81 05/29/2024    BUN 24.9 (H) 05/29/2024    CR 0.96 05/29/2024    GFRESTIMATED 80 05/29/2024    LYN 9.6 05/29/2024     Lab Results   Component Value Date    AST 23 05/29/2024     Lab Results   Component Value Date    ALT 16 05/29/2024     No results found for: \"BILICONJ\" "   Lab Results   Component Value Date    BILITOTAL 0.4 05/29/2024     Lab Results   Component Value Date    ALBUMIN 4.3 05/29/2024     Lab Results   Component Value Date    PROTTOTAL 7.3 05/29/2024      Lab Results   Component Value Date    ALKPHOS 77 05/29/2024       Assessment    Juan Delarosa is a 78 year old male seen as a PAC referral for risk assessment and optimization for anesthesia.    Plan/Recommendations  Pt will be optimized for the proposed procedure.  See below for details on the assessment, risk, and preoperative recommendations    NEUROLOGY  - No history of TIA, CVA or seizure    History of concussion 1-2 years ago. No residual symptoms  -Post Op delirium risk factors:  Age    ENT  - No current airway concerns.  Will need to be reassessed day of surgery.  Mallampati: I  TM: > 3    Chronic mild swallowing difficulties, especially with dry foods  History of positional vertigo s/p multiple therapies including Epley maneuver. No significant issues for past year    CARDIAC  HLD. Zetia and Crestor at HS. HYPERTENSION. Good control. Losartan at HS. CAD s/p PCI with JO ANN to mid-LCx . Low-normal LVEF 50-55%, mild inferior wall hypokinesis. Aortic root moderately dilated at 4.8 cm. Proximal ascending aorta had grown in size (4.6->4.8cm) on last echocardiogram. ASA 81 mg at HS, but will hold for 7 days prior to surgery. Denies chest pain, irregular HR or DYSPNEA ON EXERTION. Cardiology preop evaluation on 5/3/24 for previous surgery. Is walking a mile daily without difficulty.     - METS (Metabolic Equivalents)<4     RCRI: 6.6% risk of serious cardiac events    PULMONARY  Denies asthma, cough or use of inhaler.   Intermediate risk for DEEP  - Tobacco History    History   Smoking Status    Never   Smokeless Tobacco    Never       GI: Denies GERD  PONV High Risk  Total Score: 3           1 AN PONV: Patient is not a current smoker    1 AN PONV: Patient has history of PONV    1 AN PONV: Intended Post Op Opioids  "       /RENAL  - Baseline Creatinine  0.96  History of kidney stones    ENDOCRINE    - BMI: Estimated body mass index is 29.52 kg/m  as calculated from the following:    Height as of this encounter: 1.74 m (5' 8.5\").    Weight as of this encounter: 89.4 kg (197 lb).  Overweight (BMI 25.0-29.9)  - No history of Diabetes Mellitus    HEME  VTE risk 3%  Denies personal or family history of blood clots  Denies personal or family history of bleeding disorder  Denies history of blood transfusion      Type and screen drawn by Dr. Dela Cruz on 4/18/24 revealed Anti-M blood antibody. Patient was informed during his last visit. Discussed with Blood Bank today. Patient is from [place] but plan was made for him to have updated type and screen done on 6/18/24 at [facility]. Order provided and lab appt to be made by coordinator.      May require updated type and screen on DOS     ACCESS: Difficult IV access. Multiple IV sticks attempted last time    Different anesthesia methods/types have been discussed with the patient, but they are aware that the final plan will be decided by the assigned anesthesia provider on the date of service.    The patient is optimized for their procedure. AVS with information on surgery time/arrival time, meds and NPO status given by nursing staff. No further diagnostic testing indicated.      On the day of service:     Prep time: 14 minutes  Visit time: 14 minutes  Documentation time: 14 minutes  ------------------------------------------  Total time: 42 minutes      LISA Anders CNS  Preoperative Assessment Center  [facility]  Phone: [phone]  Fax: [phone]    "

## 2024-05-29 NOTE — PATIENT INSTRUCTIONS
Preparing for Your Surgery      Name:  Juan Delarosa   MRN:  [id number]   :  1945   Today's Date:  2024       Arriving for surgery:  Surgery date:  2024  Arrival time:  5:30 am    Please come to:     Please come to:       [facility] [employer] [facility]    [address] [place]  [address]     The [facility] [place] Patient/Visitor Ramp is at [address]. Patients and visitors who self-park will receive the reduced hospital parking rate. If the Patient /Visitor Ramp is full, please follow the signs to the [employer] car park located at the main hospital entrance.       parking is available (24 hours/ 7 days a week)      Discounted parking pass options are available for patients and visitors. They can be purchased at the [place] desk at the main hospital entrance.     -    Stop at the security desk and they will direct surgery patients to the Surgery Check in and [facility] [phone]        - If you need directions, a wheelchair or an escort please stop at the Information/security desk in the lobby.     What can I eat or drink?  -  You may eat and drink normally up to 8 hours prior to arrival time. (Until 24 at 11 pm)  -  You may have clear liquids until 2 hours prior to arrival time. (Until 3:30 am)    Examples of clear liquids:  Water  Clear broth  Juices (apple, white grape, white cranberry  and cider) without pulp  Noncarbonated, powder based beverages  (lemonade and Isai-Aid)  Sodas (Sprite, 7-Up, ginger ale and seltzer)  Coffee or tea (without milk or cream)  Gatorade    -  No Alcohol or cannabis products for at least 24 hours before surgery.     Which medicines can I take?    Hold Aspirin for 7 days before surgery.   Hold Multivitamins for 7 days before surgery.  Hold Supplements for 7 days before surgery.  Hold Ibuprofen (Advil, Motrin) for 1 day(s) before surgery--unless  otherwise directed by surgeon.  Hold Naproxen (Aleve) for 4 days before surgery.    -  DO NOT take these medications the day of surgery:  Docusate      -  PLEASE TAKE these medications the day of surgery:  Methocarbamol if needed       How do I prepare myself?  - Please take 2 showers (one the night prior to surgery and one the morning of surgery) using Scrubcare or Hibiclens soap.    Use this soap only from the neck to your toes.     Leave the soap on your skin for one minute--then rinse thoroughly.      You may use your own shampoo and conditioner. No other hair products.   - Please remove all jewelry and body piercings.  - No lotions, deodorants or fragrance.  - No makeup or fingernail polish.   - Bring your ID and insurance card.    -If you use a CPAP machine, please bring the CPAP machine, tubing, and mask to hospital.    -If you have a Deep Brain Stimulator, Spinal Cord Stimulator, or any Neuro Stimulator device---you must bring the remote control to the hospital.      ALL PATIENTS GOING HOME THE SAME DAY OF SURGERY ARE REQUIRED TO HAVE A RESPONSIBLE ADULT TO DRIVE AND BE IN ATTENDANCE WITH THEM FOR 24 HOURS FOLLOWING SURGERY.    Covid testing policy as of 12/06/2022  Your surgeon will notify and schedule you for a COVID test if one is needed before surgery--please direct any questions or COVID symptoms to your surgeon      Questions or Concerns:    - For any questions regarding the day of surgery or your hospital stay, please contact the Pre Admission Nursing Office at [phone].       - If you have health changes between today and your surgery, please call your surgeon.     For any medical questions or concerns regarding medications, bowel prep, or FMLA paperwork, please contact:     Summer RN: [phone]         - For questions after surgery, please call your surgeons office.           Current Visitor Guidelines    You may have 2 visitors in the pre op area.    Visiting hours: 8 a.m. to 8:30  p.m.    Patients confirmed or suspected to have symptoms of COVID 19 or flu:     No visitors allowed for adult patients.   Children (under age 18) can have 1 named visitor.     People who are sick or showing symptoms of COVID 19 or flu:    Are not allowed to visit patients--we can only make exceptions in special situations.       Please follow these guidelines for your visit:          Please maintain social distance          Masking is optional--however at times you may be asked to wear a mask for the safety of yourself and others     Clean your hands with alcohol hand . Do this when you arrive at and leave the building and patient room,    And again after you touch your mask or anything in the room.     Go directly to and from the room you are visiting.     Stay in the patient s room during your visit. Limit going to other places in the hospital as much as possible     Leave bags and jackets at home or in the car.     For everyone s health, please don t come and go during your visit. That includes for smoking   during your visit.

## 2024-05-29 NOTE — PROGRESS NOTES
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 Dela Cruz with PCI of 8.    Final Diagnosis   A. APPENDIX, APPENDECTOMY:  -Low-grade appendiceal mucinous neoplasm (LAMN)  -Proximal appendiceal margin is free of neoplastic involvement  -Transmural defect/perforation with serosal deposits of acellular mucin (also present at site of mesenteric margin)     Interval history: Juan has been doing well.  No significant pain.  He is tolerating a regular diet and no nausea or vomiting.  No fevers or chills.  Having normal bowel movements.    Physical Examination:   /67 (BP Location: Right arm, Patient Position: Sitting, Cuff Size: Adult Large)   Pulse 68   Ht 1.727 m (5' 8\")   Wt 87.5 kg (193 lb)   SpO2 96%   BMI 29.35 kg/m    General: Alert, oriented, in no acute distress, sitting comfortably  HEENT: Mucous membranes moist  Abdomen: Soft, nondistended, nontender on palpation.  Incisions well-approximated without any erythema or drainage.    Assessment/Plan:  78 year old male status post diagnostic laparoscopy with peritoneal cytology and laparoscopic appendectomy on 24 by Dr. Dela Cruz with PCI of 8.  He is recovering quite well.  He is scheduled for HIPEC on  with Dr. Dela Cruz and will be meeting with him virtually before this.  No lifting more than 10 pounds for full 6 weeks from this surgery but can continue on a regular diet.  Encouraged him to contact the clinic in the meantime with any questions or concerns. Patient's questions were answered to his stated satisfaction and he is in agreement with this plan.     Medical history:  Past Medical History:   Diagnosis Date    Ascending aorta dilation (H24)     CAD (coronary artery " disease)     Concussion     Gastroesophageal reflux disease     HLD (hyperlipidemia)     HTN (hypertension)     Kidney stone     Malignant neoplasm of appendix (H)     PONV (postoperative nausea and vomiting)     Pseudomyxoma peritonei (H)     Vertigo        Surgical history:  Past Surgical History:   Procedure Laterality Date    COLONOSCOPY      mulitple    IR MISCELLANEOUS PROCEDURE  01/18/2006    IR MISCELLANEOUS PROCEDURE  01/18/2006    KNEE SURGERY Bilateral     LAPAROSCOPIC APPENDECTOMY N/A 5/14/2024    Procedure: LAPAROSCOPY, DIAGNOSTIC, with peritineal cytology and appendectomy;  Surgeon: Justin Dela Cruz MD;  Location: [facility] OR    LUMBAR DISCECTOMY      PERCUTANEOUS NEPHROSTOMY      SHOULDER SURGERY         Problem list:    Patient Active Problem List    Diagnosis Date Noted    Pseudomyxoma peritonei (H) 05/14/2024     Priority: Medium       Medications:  Current Outpatient Medications   Medication Sig Dispense Refill    aspirin 81 MG EC tablet Take 81 mg by mouth at bedtime      Cholecalciferol (VITAMIN D) 50 MCG (2000 UT) CAPS Take 5,000 Units by mouth daily      docusate sodium (COLACE) 50 MG capsule Take 50 mg by mouth 2 times daily      ezetimibe (ZETIA) 10 MG tablet Take 10 mg by mouth at bedtime      losartan (COZAAR) 25 MG tablet Take 1 tablet (25 mg) by mouth at bedtime      methocarbamol (ROBAXIN) 500 MG tablet Take 1 tablet (500 mg) by mouth 4 times daily as needed for muscle spasms 30 tablet 1    nitroGLYcerin (NITROSTAT) 0.4 MG sublingual tablet Place 0.4 mg under the tongue every 5 minutes as needed for chest pain      rosuvastatin (CRESTOR) 5 MG tablet Take 5 mg by mouth at bedtime      acetaminophen (TYLENOL) 325 MG tablet Take 2 tablets (650 mg) by mouth every 4 hours as needed for mild pain (Patient not taking: Reported on 5/29/2024) 50 tablet 0    ibuprofen (ADVIL/MOTRIN) 600 MG tablet Take 1 tablet (600 mg) by mouth every 6 hours as needed for other (mild and/or inflammatory pain)  "(Patient not taking: Reported on 5/29/2024) 30 tablet 0    oxyCODONE (ROXICODONE) 5 MG tablet Take 0.5-1 tablets (2.5-5 mg) by mouth every 6 hours as needed for moderate to severe pain (Patient not taking: Reported on 5/29/2024) 16 tablet 0       Allergies:  Allergies   Allergen Reactions    Blood Transfusion Related (Informational Only)      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  A delay in compatible RBCs may occur.    Gabapentin Dizziness       Family history:  Family History   Problem Relation Age of Onset    Diabetes Type 2  Sister     Diabetes Type 2  Sister     Diabetes Type 2  Sister     Diabetes Type 2  Brother     Diabetes Type 2  Brother     Diabetes Type 2  Brother     Diabetes Type 2  Brother     Diabetes Type 2  Brother     Diabetes Type 2  Brother     Stomach Cancer Maternal Grandmother     Anesthesia Reaction No family hx of     Clotting Disorder No family hx of     Bleeding Disorder No family hx of        Social history:  Social History     Tobacco Use    Smoking status: Never    Smokeless tobacco: Never   Substance Use Topics    Alcohol use: Never     Marital status: .    Nursing Notes:   Zoey Cottrell  5/29/2024 11:04 AM  Signed  Chief Complaint   Patient presents with    Post-Op - General Surgery     Post-op DOS 5/14/2024       Vitals:    05/29/24 1059   BP: 110/67   BP Location: Right arm   Patient Position: Sitting   Cuff Size: Adult Large   Pulse: 68   SpO2: 96%   Weight: 87.5 kg (193 lb)   Height: 1.727 m (5' 8\")       Body mass index is 29.35 kg/m .                          SATINDER Bourgeois       15 minutes spent on the date of the encounter doing chart review, history and exam, documentation and further activities as noted above.   This is a postop visit.    LISA Tay, NP-C  Colon and Rectal Surgery  [facility]    This note was created using speech recognition software and may " contain unintended word substitutions.

## 2024-05-29 NOTE — NURSING NOTE
"Chief Complaint   Patient presents with    Post-Op - General Surgery     Post-op DOS 5/14/2024       Vitals:    05/29/24 1059   BP: 110/67   BP Location: Right arm   Patient Position: Sitting   Cuff Size: Adult Large   Pulse: 68   SpO2: 96%   Weight: 87.5 kg (193 lb)   Height: 1.727 m (5' 8\")       Body mass index is 29.35 kg/m .                          Zoey Cottrell, EMT    "

## 2024-05-30 LAB — PREALB SERPL-MCNC: 28.7 MG/DL (ref 20–40)

## 2024-06-04 ENCOUNTER — TEAM CONFERENCE (OUTPATIENT)
Dept: SURGERY | Facility: CLINIC | Age: 79
End: 2024-06-04
Payer: COMMERCIAL

## 2024-06-04 NOTE — PROGRESS NOTES
COLON AND RECTAL SURGERY HUDDLE:    Patient was reviewed in preparation for their surgery the following was reviewed and has been completed:    Surgeon: Dr. Justin Dela Cruz    Surgery & Date: 6/20    OPEN CYTOREDUCTIVE SURGERY, WITH HYPERTHERMIC INTRAPERITONEAL CHEMOTHERAPY (     Last MD Note: reviewed    Anesthesia Type: General    Other Providers: No    PAC: Yes    WOC: No    Labs: Yes    Bowel Prep: No No Prep    Packet: Yes    Imaging: N/A    Post-Op Appointments: Yes    Pre op soap: Yes Questions about shower: no    Is patient on TPN?: No   If yes, I contacted the TPN pharmacist by paging the  pharmacy at [phone] or calling [phone]. I also contacted Lucia PERALTA with inpatient colon and rectal team.     Pre op call complete: Yes

## 2024-06-17 ENCOUNTER — VIRTUAL VISIT (OUTPATIENT)
Dept: SURGERY | Facility: CLINIC | Age: 79
End: 2024-06-17
Payer: COMMERCIAL

## 2024-06-17 VITALS — HEIGHT: 69 IN | BODY MASS INDEX: 28.88 KG/M2 | WEIGHT: 195 LBS

## 2024-06-17 DIAGNOSIS — C18.1 MALIGNANT NEOPLASM OF APPENDIX (H): Primary | ICD-10-CM

## 2024-06-17 LAB
ABO/RH(D): ABNORMAL
ANTIBODY SCREEN: POSITIVE
SPECIMEN EXPIRATION DATE: ABNORMAL

## 2024-06-17 PROCEDURE — 99024 POSTOP FOLLOW-UP VISIT: CPT | Mod: 93 | Performed by: COLON & RECTAL SURGERY

## 2024-06-17 RX ORDER — POLYETHYLENE GLYCOL 3350 17 G/17G
238 POWDER, FOR SOLUTION ORAL SEE ADMIN INSTRUCTIONS
Qty: 14 PACKET | Refills: 0 | Status: ON HOLD | OUTPATIENT
Start: 2024-06-17 | End: 2024-06-24

## 2024-06-17 RX ORDER — ONDANSETRON 4 MG/1
4 TABLET, FILM COATED ORAL EVERY 6 HOURS
Qty: 3 TABLET | Refills: 0 | Status: ON HOLD | OUTPATIENT
Start: 2024-06-17 | End: 2024-06-24

## 2024-06-17 RX ORDER — MAGNESIUM CARB/ALUMINUM HYDROX 105-160MG
296 TABLET,CHEWABLE ORAL ONCE
Qty: 296 ML | Refills: 0 | Status: SHIPPED | OUTPATIENT
Start: 2024-06-17 | End: 2024-06-17

## 2024-06-17 RX ORDER — METRONIDAZOLE 500 MG/1
500 TABLET ORAL EVERY 6 HOURS
Qty: 3 TABLET | Refills: 0 | Status: ON HOLD | OUTPATIENT
Start: 2024-06-17 | End: 2024-06-24

## 2024-06-17 RX ORDER — NEOMYCIN SULFATE 500 MG/1
1000 TABLET ORAL EVERY 6 HOURS
Qty: 6 TABLET | Refills: 0 | Status: ON HOLD | OUTPATIENT
Start: 2024-06-17 | End: 2024-06-24

## 2024-06-17 ASSESSMENT — PAIN SCALES - GENERAL: PAINLEVEL: MILD PAIN (2)

## 2024-06-17 NOTE — NURSING NOTE
"Chief Complaint   Patient presents with    Follow Up       Vitals:    06/17/24 1407   Weight: 195 lb   Height: 5' 8.5\"       Body mass index is 29.22 kg/m .    Chelsy Austin CMA    "

## 2024-06-17 NOTE — LETTER
"2024       RE: Juan Delarosa  [address]  [facility] [address]       Dear Colleague,    Thank you for referring your patient, Juan Delarosa, to the [facility] COLON AND RECTAL SURGERY CLINIC [place] at [facility]. Please see a copy of my visit note below.    Colon and Rectal Surgery Follow-up Telephone Note      RE: Juan Delarosa  : 1945  RIRI: 2024    Juan Delarosa is a 78 year old male who is being evaluated via a billable telephone visit.      The patient has been notified of following:     \"This telephone visit will be conducted via a call between you and your physician/provider. We have found that certain health care needs can be provided without the need for a physical exam.  This service lets us provide the care you need with a short phone conversation.  If a prescription is necessary we can send it directly to your pharmacy.  If lab work is needed we can place an order for that and you can then stop by our lab to have the test done at a later time.    If during the course of the call the physician/provider feels a telephone visit is not appropriate, you will not be charged for this service.\"     Patient has given verbal consent for telephone visit? Yes     DIAGNOSIS: 78 year old male with cystic neoplasm of the appendix who is now status post diagnostic laparoscopy with peritoneal cytology and laparoscopic appendectomy with PCI of 8 on 24.    INTERVAL HISTORY: Je is recovering well from his diagnostic laparoscopy and appendectomy.  He has no significant complaints at this time.  No major changes in his overall health.     ASSESSMENT  Doing well postop.  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 We discussed the role and impact of cytoreductive surgery with hyperthermic intraperitoneal chemotherapy for metastatic appendiceal cancer, including the potential need for several cycles of preoperative " chemotherapy, intraoperative findings that may limit or preclude the extent of resection and delivery of intraperitoneal chemotherapy, postoperative complications and sequelae, likelihood of recurrent cancer, and quality of life. We also discussed recent grade IA data (Prodige 7 trial) showing no significant differences in overall survival with cytoreductive surgery with hyperthermic intraperitoneal chemotherapy versus cytoreductive surgery alone. The patient understands these well and agrees to proceed. All questions were answered to his/her satisfaction.     Final Diagnosis   A. APPENDIX, APPENDECTOMY:  -Low-grade appendiceal mucinous neoplasm (LAMN)  -Proximal appendiceal margin is free of neoplastic involvement  -Transmural defect/perforation with serosal deposits of acellular mucin (also present at site of mesenteric margin)     PLAN  1.  Je is already scheduled for open cytoreduction and hyperthermic intraperitoneal chemotherapy this Thursday.  He will need PAC, labs, and mechanical bowel prep with antibiotics.  `    20 minutes spent on the date of the encounter, discussing his diagnosis, workup, and future operative plan.        Referring Provider:  Kiet Mckeon MD     Primary Care Provider:  Sean Melendrez      Again, thank you for allowing me to participate in the care of your patient.      Sincerely,    Justin Dela Cruz MD

## 2024-06-18 ENCOUNTER — LAB (OUTPATIENT)
Dept: LAB | Facility: CLINIC | Age: 79
End: 2024-06-18
Payer: COMMERCIAL

## 2024-06-18 DIAGNOSIS — R76.8 RED BLOOD CELL ANTIBODY POSITIVE: ICD-10-CM

## 2024-06-18 DIAGNOSIS — C78.6 PSEUDOMYXOMA PERITONEI (H): ICD-10-CM

## 2024-06-18 DIAGNOSIS — Z01.818 PREOP EXAMINATION: ICD-10-CM

## 2024-06-18 PROCEDURE — 86901 BLOOD TYPING SEROLOGIC RH(D): CPT

## 2024-06-18 PROCEDURE — 36415 COLL VENOUS BLD VENIPUNCTURE: CPT

## 2024-06-18 PROCEDURE — 86900 BLOOD TYPING SEROLOGIC ABO: CPT

## 2024-06-18 PROCEDURE — 86850 RBC ANTIBODY SCREEN: CPT

## 2024-06-20 ENCOUNTER — ANESTHESIA (OUTPATIENT)
Dept: SURGERY | Facility: CLINIC | Age: 79
DRG: 330 | End: 2024-06-20
Payer: MEDICARE

## 2024-06-20 ENCOUNTER — HOSPITAL ENCOUNTER (INPATIENT)
Facility: CLINIC | Age: 79
LOS: 5 days | Discharge: HOME | DRG: 330 | End: 2024-06-25
Attending: COLON & RECTAL SURGERY | Admitting: COLON & RECTAL SURGERY
Payer: MEDICARE

## 2024-06-20 DIAGNOSIS — C78.6 PSEUDOMYXOMA PERITONEI (H): ICD-10-CM

## 2024-06-20 DIAGNOSIS — D37.3 LOW GRADE MUCINOUS NEOPLASM OF APPENDIX: Primary | ICD-10-CM

## 2024-06-20 LAB
ALBUMIN SERPL BCG-MCNC: 3.4 G/DL (ref 3.5–5.2)
ALP SERPL-CCNC: 61 U/L (ref 40–150)
ALT SERPL W P-5'-P-CCNC: 16 U/L (ref 0–70)
ANION GAP SERPL CALCULATED.3IONS-SCNC: 16 MMOL/L (ref 7–15)
AST SERPL W P-5'-P-CCNC: 30 U/L (ref 0–45)
BASE EXCESS BLDA CALC-SCNC: -8.2 MMOL/L (ref -3–3)
BASE EXCESS BLDV CALC-SCNC: -8.6 MMOL/L (ref -3–3)
BILIRUB SERPL-MCNC: 0.4 MG/DL
BLD PROD TYP BPU: NORMAL
BLD PROD TYP BPU: NORMAL
BLOOD COMPONENT TYPE: NORMAL
BLOOD COMPONENT TYPE: NORMAL
BUN SERPL-MCNC: 18.4 MG/DL (ref 8–23)
CA-I BLD-MCNC: 3.8 MG/DL (ref 4.4–5.2)
CALCIUM SERPL-MCNC: 7.4 MG/DL (ref 8.8–10.2)
CHLORIDE SERPL-SCNC: 104 MMOL/L (ref 98–107)
CODING SYSTEM: NORMAL
CODING SYSTEM: NORMAL
CREAT SERPL-MCNC: 0.96 MG/DL (ref 0.67–1.17)
CROSSMATCH: NORMAL
CROSSMATCH: NORMAL
DEPRECATED HCO3 PLAS-SCNC: 16 MMOL/L (ref 22–29)
EGFRCR SERPLBLD CKD-EPI 2021: 80 ML/MIN/1.73M2
GLUCOSE BLD-MCNC: 109 MG/DL (ref 70–99)
GLUCOSE BLDC GLUCOMTR-MCNC: 85 MG/DL (ref 70–99)
GLUCOSE SERPL-MCNC: 138 MG/DL (ref 70–99)
HCO3 BLDA-SCNC: 18 MMOL/L (ref 21–28)
HCO3 BLDV-SCNC: 19 MMOL/L (ref 21–28)
HGB BLD-MCNC: 14.1 G/DL (ref 13.3–17.7)
HGB BLD-MCNC: 14.6 G/DL (ref 13.3–17.7)
HOLD SPECIMEN: NORMAL
INR PPP: 1.23 (ref 0.85–1.15)
LACTATE BLD-SCNC: 1.3 MMOL/L (ref 0.7–2)
MAGNESIUM SERPL-MCNC: 2.2 MG/DL (ref 1.7–2.3)
O2/TOTAL GAS SETTING VFR VENT: 40 %
O2/TOTAL GAS SETTING VFR VENT: 40 %
OXYHGB MFR BLDA: 96 % (ref 92–100)
OXYHGB MFR BLDV: 79 % (ref 70–75)
PCO2 BLDA: 39 MM HG (ref 35–45)
PCO2 BLDV: 46 MM HG (ref 40–50)
PH BLDA: 7.28 [PH] (ref 7.35–7.45)
PH BLDV: 7.23 [PH] (ref 7.32–7.43)
PO2 BLDA: 113 MM HG (ref 80–105)
PO2 BLDV: 50 MM HG (ref 25–47)
POTASSIUM BLD-SCNC: 3.8 MMOL/L (ref 3.4–5.3)
POTASSIUM SERPL-SCNC: 4.1 MMOL/L (ref 3.4–5.3)
PROT SERPL-MCNC: 5.6 G/DL (ref 6.4–8.3)
SAO2 % BLDA: 98 % (ref 95–96)
SAO2 % BLDV: 80.5 % (ref 70–75)
SODIUM BLD-SCNC: 137 MMOL/L (ref 135–145)
SODIUM SERPL-SCNC: 136 MMOL/L (ref 135–145)
UNIT ABO/RH: NORMAL
UNIT ABO/RH: NORMAL
UNIT NUMBER: NORMAL
UNIT NUMBER: NORMAL
UNIT STATUS: NORMAL
UNIT STATUS: NORMAL
UNIT TYPE ISBT: 6200
UNIT TYPE ISBT: 6200

## 2024-06-20 PROCEDURE — 250N000011 HC RX IP 250 OP 636: Performed by: COLON & RECTAL SURGERY

## 2024-06-20 PROCEDURE — 272N000086 HC PACK RI-RAPID INFUSION: Performed by: COLON & RECTAL SURGERY

## 2024-06-20 PROCEDURE — 360N000078 HC SURGERY LEVEL 5, PER MIN: Performed by: COLON & RECTAL SURGERY

## 2024-06-20 PROCEDURE — 88305 TISSUE EXAM BY PATHOLOGIST: CPT | Mod: 26 | Performed by: PATHOLOGY

## 2024-06-20 PROCEDURE — 85610 PROTHROMBIN TIME: CPT | Performed by: COLON & RECTAL SURGERY

## 2024-06-20 PROCEDURE — 410N000004: Performed by: COLON & RECTAL SURGERY

## 2024-06-20 PROCEDURE — 96548 NTRAOP HIPEC PX EA ADD 30MIN: CPT | Mod: GC | Performed by: COLON & RECTAL SURGERY

## 2024-06-20 PROCEDURE — 258N000003 HC RX IP 258 OP 636: Mod: JZ | Performed by: STUDENT IN AN ORGANIZED HEALTH CARE EDUCATION/TRAINING PROGRAM

## 2024-06-20 PROCEDURE — 3E0M30Y INTRODUCTION OF HYPERTHERMIC ANTINEOPLASTIC INTO PERITONEAL CAVITY, PERCUTANEOUS APPROACH: ICD-10-PCS | Performed by: COLON & RECTAL SURGERY

## 2024-06-20 PROCEDURE — 370N000017 HC ANESTHESIA TECHNICAL FEE, PER MIN: Performed by: COLON & RECTAL SURGERY

## 2024-06-20 PROCEDURE — 250N000009 HC RX 250: Performed by: STUDENT IN AN ORGANIZED HEALTH CARE EDUCATION/TRAINING PROGRAM

## 2024-06-20 PROCEDURE — 250N000011 HC RX IP 250 OP 636: Mod: JZ

## 2024-06-20 PROCEDURE — 36415 COLL VENOUS BLD VENIPUNCTURE: CPT

## 2024-06-20 PROCEDURE — 250N000013 HC RX MED GY IP 250 OP 250 PS 637: Performed by: COLON & RECTAL SURGERY

## 2024-06-20 PROCEDURE — 82330 ASSAY OF CALCIUM: CPT

## 2024-06-20 PROCEDURE — 250N000011 HC RX IP 250 OP 636: Mod: JZ | Performed by: COLON & RECTAL SURGERY

## 2024-06-20 PROCEDURE — 83735 ASSAY OF MAGNESIUM: CPT | Performed by: COLON & RECTAL SURGERY

## 2024-06-20 PROCEDURE — 250N000011 HC RX IP 250 OP 636: Mod: JZ | Performed by: ANESTHESIOLOGY

## 2024-06-20 PROCEDURE — 96547 INTRAOP HIPEC PX 1ST 60 MIN: CPT | Mod: GC | Performed by: COLON & RECTAL SURGERY

## 2024-06-20 PROCEDURE — 07BB0ZX EXCISION OF MESENTERIC LYMPHATIC, OPEN APPROACH, DIAGNOSTIC: ICD-10-PCS | Performed by: COLON & RECTAL SURGERY

## 2024-06-20 PROCEDURE — 88307 TISSUE EXAM BY PATHOLOGIST: CPT | Mod: 26 | Performed by: PATHOLOGY

## 2024-06-20 PROCEDURE — 49203: CPT | Performed by: STUDENT IN AN ORGANIZED HEALTH CARE EDUCATION/TRAINING PROGRAM

## 2024-06-20 PROCEDURE — 44140 PARTIAL REMOVAL OF COLON: CPT | Mod: 52 | Performed by: COLON & RECTAL SURGERY

## 2024-06-20 PROCEDURE — 250N000011 HC RX IP 250 OP 636: Mod: JZ | Performed by: STUDENT IN AN ORGANIZED HEALTH CARE EDUCATION/TRAINING PROGRAM

## 2024-06-20 PROCEDURE — 88307 TISSUE EXAM BY PATHOLOGIST: CPT | Mod: TC | Performed by: COLON & RECTAL SURGERY

## 2024-06-20 PROCEDURE — 710N000010 HC RECOVERY PHASE 1, LEVEL 2, PER MIN: Performed by: COLON & RECTAL SURGERY

## 2024-06-20 PROCEDURE — 999N000141 HC STATISTIC PRE-PROCEDURE NURSING ASSESSMENT: Performed by: COLON & RECTAL SURGERY

## 2024-06-20 PROCEDURE — 272N000001 HC OR GENERAL SUPPLY STERILE: Performed by: COLON & RECTAL SURGERY

## 2024-06-20 PROCEDURE — 250N000011 HC RX IP 250 OP 636

## 2024-06-20 PROCEDURE — 99100 ANES PT EXTEME AGE<1 YR&>70: CPT | Performed by: ANESTHESIOLOGY

## 2024-06-20 PROCEDURE — 82805 BLOOD GASES W/O2 SATURATION: CPT

## 2024-06-20 PROCEDURE — 84132 ASSAY OF SERUM POTASSIUM: CPT | Performed by: COLON & RECTAL SURGERY

## 2024-06-20 PROCEDURE — 3E0M05Z INTRODUCTION OF ADHESION BARRIER INTO PERITONEAL CAVITY, OPEN APPROACH: ICD-10-PCS | Performed by: COLON & RECTAL SURGERY

## 2024-06-20 PROCEDURE — C9290 INJ, BUPIVACAINE LIPOSOME: HCPCS

## 2024-06-20 PROCEDURE — 0DBH0ZZ EXCISION OF CECUM, OPEN APPROACH: ICD-10-PCS | Performed by: COLON & RECTAL SURGERY

## 2024-06-20 PROCEDURE — 120N000002 HC R&B MED SURG/OB UMMC

## 2024-06-20 PROCEDURE — 86920 COMPATIBILITY TEST SPIN: CPT

## 2024-06-20 PROCEDURE — 49203 PR EXCISION/DESTRUCTION OPEN ABDOMINAL TUMORS 5 CM: CPT | Mod: 58 | Performed by: COLON & RECTAL SURGERY

## 2024-06-20 PROCEDURE — 250N000025 HC SEVOFLURANE, PER MIN: Performed by: COLON & RECTAL SURGERY

## 2024-06-20 PROCEDURE — 36620 INSERTION CATHETER ARTERY: CPT | Mod: 59 | Performed by: ANESTHESIOLOGY

## 2024-06-20 PROCEDURE — 272N000002 HC OR SUPPLY OTHER OPNP: Performed by: COLON & RECTAL SURGERY

## 2024-06-20 PROCEDURE — 410N000003 HC PER-PERFUSION 1ST 30 MIN: Performed by: COLON & RECTAL SURGERY

## 2024-06-20 PROCEDURE — 250N000009 HC RX 250: Performed by: ANESTHESIOLOGY

## 2024-06-20 PROCEDURE — 0DBU0ZZ EXCISION OF OMENTUM, OPEN APPROACH: ICD-10-PCS | Performed by: COLON & RECTAL SURGERY

## 2024-06-20 PROCEDURE — 85018 HEMOGLOBIN: CPT | Performed by: COLON & RECTAL SURGERY

## 2024-06-20 PROCEDURE — 49203: CPT | Performed by: ANESTHESIOLOGY

## 2024-06-20 PROCEDURE — 07BC0ZX EXCISION OF PELVIS LYMPHATIC, OPEN APPROACH, DIAGNOSTIC: ICD-10-PCS | Performed by: COLON & RECTAL SURGERY

## 2024-06-20 PROCEDURE — 258N000003 HC RX IP 258 OP 636: Mod: JZ | Performed by: COLON & RECTAL SURGERY

## 2024-06-20 PROCEDURE — 99100 ANES PT EXTEME AGE<1 YR&>70: CPT | Performed by: STUDENT IN AN ORGANIZED HEALTH CARE EDUCATION/TRAINING PROGRAM

## 2024-06-20 RX ORDER — BUPIVACAINE HYDROCHLORIDE 2.5 MG/ML
INJECTION, SOLUTION EPIDURAL; INFILTRATION; INTRACAUDAL
Status: COMPLETED | OUTPATIENT
Start: 2024-06-20 | End: 2024-06-20

## 2024-06-20 RX ORDER — NALOXONE HYDROCHLORIDE 0.4 MG/ML
0.1 INJECTION, SOLUTION INTRAMUSCULAR; INTRAVENOUS; SUBCUTANEOUS
Status: DISCONTINUED | OUTPATIENT
Start: 2024-06-20 | End: 2024-06-20 | Stop reason: HOSPADM

## 2024-06-20 RX ORDER — SODIUM CHLORIDE, SODIUM LACTATE, POTASSIUM CHLORIDE, CALCIUM CHLORIDE 600; 310; 30; 20 MG/100ML; MG/100ML; MG/100ML; MG/100ML
INJECTION, SOLUTION INTRAVENOUS CONTINUOUS
Status: DISCONTINUED | OUTPATIENT
Start: 2024-06-20 | End: 2024-06-20 | Stop reason: HOSPADM

## 2024-06-20 RX ORDER — CELECOXIB 200 MG/1
200 CAPSULE ORAL ONCE
Status: COMPLETED | OUTPATIENT
Start: 2024-06-20 | End: 2024-06-20

## 2024-06-20 RX ORDER — SODIUM CHLORIDE, SODIUM GLUCONATE, SODIUM ACETATE, POTASSIUM CHLORIDE AND MAGNESIUM CHLORIDE 526; 502; 368; 37; 30 MG/100ML; MG/100ML; MG/100ML; MG/100ML; MG/100ML
INJECTION, SOLUTION INTRAVENOUS CONTINUOUS PRN
Status: DISCONTINUED | OUTPATIENT
Start: 2024-06-20 | End: 2024-06-20

## 2024-06-20 RX ORDER — CEFAZOLIN SODIUM/WATER 2 G/20 ML
2 SYRINGE (ML) INTRAVENOUS
Status: COMPLETED | OUTPATIENT
Start: 2024-06-20 | End: 2024-06-20

## 2024-06-20 RX ORDER — NALOXONE HYDROCHLORIDE 0.4 MG/ML
0.4 INJECTION, SOLUTION INTRAMUSCULAR; INTRAVENOUS; SUBCUTANEOUS
Status: DISCONTINUED | OUTPATIENT
Start: 2024-06-20 | End: 2024-06-20 | Stop reason: HOSPADM

## 2024-06-20 RX ORDER — KETOROLAC TROMETHAMINE 30 MG/ML
15 INJECTION, SOLUTION INTRAMUSCULAR; INTRAVENOUS EVERY 6 HOURS PRN
Status: DISCONTINUED | OUTPATIENT
Start: 2024-06-20 | End: 2024-06-20

## 2024-06-20 RX ORDER — HYDROMORPHONE HCL IN WATER/PF 6 MG/30 ML
0.2 PATIENT CONTROLLED ANALGESIA SYRINGE INTRAVENOUS EVERY 5 MIN PRN
Status: DISCONTINUED | OUTPATIENT
Start: 2024-06-20 | End: 2024-06-20 | Stop reason: HOSPADM

## 2024-06-20 RX ORDER — NALOXONE HYDROCHLORIDE 0.4 MG/ML
0.2 INJECTION, SOLUTION INTRAMUSCULAR; INTRAVENOUS; SUBCUTANEOUS
Status: DISCONTINUED | OUTPATIENT
Start: 2024-06-20 | End: 2024-06-25 | Stop reason: HOSPADM

## 2024-06-20 RX ORDER — NALOXONE HYDROCHLORIDE 0.4 MG/ML
0.2 INJECTION, SOLUTION INTRAMUSCULAR; INTRAVENOUS; SUBCUTANEOUS
Status: DISCONTINUED | OUTPATIENT
Start: 2024-06-20 | End: 2024-06-20 | Stop reason: HOSPADM

## 2024-06-20 RX ORDER — METRONIDAZOLE 500 MG/100ML
500 INJECTION, SOLUTION INTRAVENOUS
Status: COMPLETED | OUTPATIENT
Start: 2024-06-20 | End: 2024-06-20

## 2024-06-20 RX ORDER — DEXAMETHASONE SODIUM PHOSPHATE 4 MG/ML
INJECTION, SOLUTION INTRA-ARTICULAR; INTRALESIONAL; INTRAMUSCULAR; INTRAVENOUS; SOFT TISSUE PRN
Status: DISCONTINUED | OUTPATIENT
Start: 2024-06-20 | End: 2024-06-20

## 2024-06-20 RX ORDER — ONDANSETRON 4 MG/1
4 TABLET, ORALLY DISINTEGRATING ORAL EVERY 30 MIN PRN
Status: DISCONTINUED | OUTPATIENT
Start: 2024-06-20 | End: 2024-06-20 | Stop reason: HOSPADM

## 2024-06-20 RX ORDER — ACETAMINOPHEN 10 MG/ML
1000 INJECTION, SOLUTION INTRAVENOUS ONCE
Status: COMPLETED | OUTPATIENT
Start: 2024-06-20 | End: 2024-06-20

## 2024-06-20 RX ORDER — METHOCARBAMOL 500 MG/1
500 TABLET, FILM COATED ORAL 3 TIMES DAILY PRN
Status: DISCONTINUED | OUTPATIENT
Start: 2024-06-20 | End: 2024-06-25 | Stop reason: HOSPADM

## 2024-06-20 RX ORDER — ENOXAPARIN SODIUM 100 MG/ML
40 INJECTION SUBCUTANEOUS EVERY 24 HOURS
Status: DISCONTINUED | OUTPATIENT
Start: 2024-06-21 | End: 2024-06-25 | Stop reason: HOSPADM

## 2024-06-20 RX ORDER — ERTAPENEM 1 G/1
1 INJECTION, POWDER, LYOPHILIZED, FOR SOLUTION INTRAMUSCULAR; INTRAVENOUS ONCE
Status: COMPLETED | OUTPATIENT
Start: 2024-06-21 | End: 2024-06-21

## 2024-06-20 RX ORDER — LIDOCAINE 40 MG/G
CREAM TOPICAL
Status: DISCONTINUED | OUTPATIENT
Start: 2024-06-20 | End: 2024-06-25 | Stop reason: HOSPADM

## 2024-06-20 RX ORDER — CHOLECALCIFEROL (VITAMIN D3) 125 MCG
20000 CAPSULE ORAL 2 TIMES DAILY
Status: DISCONTINUED | OUTPATIENT
Start: 2024-06-22 | End: 2024-06-25 | Stop reason: HOSPADM

## 2024-06-20 RX ORDER — PROPOFOL 10 MG/ML
INJECTION, EMULSION INTRAVENOUS PRN
Status: DISCONTINUED | OUTPATIENT
Start: 2024-06-20 | End: 2024-06-20

## 2024-06-20 RX ORDER — ENOXAPARIN SODIUM 100 MG/ML
40 INJECTION SUBCUTANEOUS
Status: COMPLETED | OUTPATIENT
Start: 2024-06-20 | End: 2024-06-20

## 2024-06-20 RX ORDER — ONDANSETRON 2 MG/ML
4 INJECTION INTRAMUSCULAR; INTRAVENOUS EVERY 30 MIN PRN
Status: DISCONTINUED | OUTPATIENT
Start: 2024-06-20 | End: 2024-06-20 | Stop reason: HOSPADM

## 2024-06-20 RX ORDER — CALCIUM CARBONATE 500 MG/1
1000 TABLET, CHEWABLE ORAL 4 TIMES DAILY PRN
Status: DISCONTINUED | OUTPATIENT
Start: 2024-06-20 | End: 2024-06-25 | Stop reason: HOSPADM

## 2024-06-20 RX ORDER — FLUMAZENIL 0.1 MG/ML
0.2 INJECTION, SOLUTION INTRAVENOUS
Status: DISCONTINUED | OUTPATIENT
Start: 2024-06-20 | End: 2024-06-20 | Stop reason: HOSPADM

## 2024-06-20 RX ORDER — CALCIUM CHLORIDE 100 MG/ML
INJECTION INTRAVENOUS; INTRAVENTRICULAR PRN
Status: DISCONTINUED | OUTPATIENT
Start: 2024-06-20 | End: 2024-06-20

## 2024-06-20 RX ORDER — NITROGLYCERIN 0.4 MG/1
0.4 TABLET SUBLINGUAL EVERY 5 MIN PRN
Status: DISCONTINUED | OUTPATIENT
Start: 2024-06-20 | End: 2024-06-25 | Stop reason: HOSPADM

## 2024-06-20 RX ORDER — LOSARTAN POTASSIUM 25 MG/1
25 TABLET ORAL AT BEDTIME
Status: DISCONTINUED | OUTPATIENT
Start: 2024-06-21 | End: 2024-06-25 | Stop reason: HOSPADM

## 2024-06-20 RX ORDER — NALOXONE HYDROCHLORIDE 0.4 MG/ML
0.4 INJECTION, SOLUTION INTRAMUSCULAR; INTRAVENOUS; SUBCUTANEOUS
Status: DISCONTINUED | OUTPATIENT
Start: 2024-06-20 | End: 2024-06-25 | Stop reason: HOSPADM

## 2024-06-20 RX ORDER — LIDOCAINE HYDROCHLORIDE 20 MG/ML
INJECTION, SOLUTION INFILTRATION; PERINEURAL PRN
Status: DISCONTINUED | OUTPATIENT
Start: 2024-06-20 | End: 2024-06-20

## 2024-06-20 RX ORDER — EPHEDRINE SULFATE 50 MG/ML
INJECTION, SOLUTION INTRAMUSCULAR; INTRAVENOUS; SUBCUTANEOUS PRN
Status: DISCONTINUED | OUTPATIENT
Start: 2024-06-20 | End: 2024-06-20

## 2024-06-20 RX ORDER — CEFAZOLIN SODIUM/WATER 2 G/20 ML
2 SYRINGE (ML) INTRAVENOUS SEE ADMIN INSTRUCTIONS
Status: DISCONTINUED | OUTPATIENT
Start: 2024-06-20 | End: 2024-06-20 | Stop reason: HOSPADM

## 2024-06-20 RX ORDER — ONDANSETRON 2 MG/ML
4 INJECTION INTRAMUSCULAR; INTRAVENOUS EVERY 6 HOURS PRN
Status: DISCONTINUED | OUTPATIENT
Start: 2024-06-20 | End: 2024-06-25 | Stop reason: HOSPADM

## 2024-06-20 RX ORDER — FENTANYL CITRATE 50 UG/ML
25 INJECTION, SOLUTION INTRAMUSCULAR; INTRAVENOUS EVERY 5 MIN PRN
Status: DISCONTINUED | OUTPATIENT
Start: 2024-06-20 | End: 2024-06-20 | Stop reason: HOSPADM

## 2024-06-20 RX ORDER — FENTANYL CITRATE 50 UG/ML
INJECTION, SOLUTION INTRAMUSCULAR; INTRAVENOUS PRN
Status: DISCONTINUED | OUTPATIENT
Start: 2024-06-20 | End: 2024-06-20

## 2024-06-20 RX ORDER — KETOROLAC TROMETHAMINE 30 MG/ML
15 INJECTION, SOLUTION INTRAMUSCULAR; INTRAVENOUS EVERY 6 HOURS
Status: DISCONTINUED | OUTPATIENT
Start: 2024-06-20 | End: 2024-06-24

## 2024-06-20 RX ORDER — ONDANSETRON 4 MG/1
4 TABLET, ORALLY DISINTEGRATING ORAL EVERY 6 HOURS PRN
Status: DISCONTINUED | OUTPATIENT
Start: 2024-06-20 | End: 2024-06-25 | Stop reason: HOSPADM

## 2024-06-20 RX ORDER — KETAMINE HYDROCHLORIDE 10 MG/ML
INJECTION INTRAMUSCULAR; INTRAVENOUS PRN
Status: DISCONTINUED | OUTPATIENT
Start: 2024-06-20 | End: 2024-06-20

## 2024-06-20 RX ORDER — FENTANYL CITRATE 50 UG/ML
25-50 INJECTION, SOLUTION INTRAMUSCULAR; INTRAVENOUS
Status: DISCONTINUED | OUTPATIENT
Start: 2024-06-20 | End: 2024-06-20 | Stop reason: HOSPADM

## 2024-06-20 RX ORDER — SODIUM CHLORIDE, SODIUM LACTATE, POTASSIUM CHLORIDE, CALCIUM CHLORIDE 600; 310; 30; 20 MG/100ML; MG/100ML; MG/100ML; MG/100ML
INJECTION, SOLUTION INTRAVENOUS CONTINUOUS
Status: DISCONTINUED | OUTPATIENT
Start: 2024-06-20 | End: 2024-06-22

## 2024-06-20 RX ORDER — GRANISETRON HYDROCHLORIDE 1 MG/ML
1 INJECTION INTRAVENOUS ONCE
Status: COMPLETED | OUTPATIENT
Start: 2024-06-20 | End: 2024-06-20

## 2024-06-20 RX ORDER — ACETAMINOPHEN 325 MG/10.15ML
1000 LIQUID ORAL 4 TIMES DAILY
Status: DISCONTINUED | OUTPATIENT
Start: 2024-06-20 | End: 2024-06-21

## 2024-06-20 RX ORDER — SODIUM CHLORIDE, SODIUM LACTATE, POTASSIUM CHLORIDE, CALCIUM CHLORIDE 600; 310; 30; 20 MG/100ML; MG/100ML; MG/100ML; MG/100ML
INJECTION, SOLUTION INTRAVENOUS CONTINUOUS PRN
Status: DISCONTINUED | OUTPATIENT
Start: 2024-06-20 | End: 2024-06-20

## 2024-06-20 RX ADMIN — Medication 10 MG: at 15:20

## 2024-06-20 RX ADMIN — Medication 10 MG: at 09:53

## 2024-06-20 RX ADMIN — PHENYLEPHRINE HYDROCHLORIDE 100 MCG: 10 INJECTION INTRAVENOUS at 10:54

## 2024-06-20 RX ADMIN — FENTANYL CITRATE 50 MCG: 50 INJECTION, SOLUTION INTRAMUSCULAR; INTRAVENOUS at 14:07

## 2024-06-20 RX ADMIN — SODIUM CHLORIDE, SODIUM GLUCONATE, SODIUM ACETATE, POTASSIUM CHLORIDE AND MAGNESIUM CHLORIDE: 526; 502; 368; 37; 30 INJECTION, SOLUTION INTRAVENOUS at 08:17

## 2024-06-20 RX ADMIN — CALCIUM CHLORIDE INJECTION 500 MG: 100 INJECTION, SOLUTION INTRAVENOUS at 12:01

## 2024-06-20 RX ADMIN — ONDANSETRON 4 MG: 2 INJECTION INTRAMUSCULAR; INTRAVENOUS at 18:37

## 2024-06-20 RX ADMIN — DEXMEDETOMIDINE HYDROCHLORIDE 0.4 MCG/KG/HR: 100 INJECTION, SOLUTION INTRAVENOUS at 12:06

## 2024-06-20 RX ADMIN — Medication 2 G: at 07:41

## 2024-06-20 RX ADMIN — KETOROLAC TROMETHAMINE 15 MG: 30 INJECTION, SOLUTION INTRAMUSCULAR at 14:36

## 2024-06-20 RX ADMIN — Medication 10 MG: at 09:30

## 2024-06-20 RX ADMIN — HYDROMORPHONE HYDROCHLORIDE 0.5 MG: 1 INJECTION, SOLUTION INTRAMUSCULAR; INTRAVENOUS; SUBCUTANEOUS at 09:31

## 2024-06-20 RX ADMIN — FENTANYL CITRATE 25 MCG: 50 INJECTION, SOLUTION INTRAMUSCULAR; INTRAVENOUS at 14:19

## 2024-06-20 RX ADMIN — Medication 20 MG: at 08:11

## 2024-06-20 RX ADMIN — FENTANYL CITRATE 50 MCG: 50 INJECTION INTRAMUSCULAR; INTRAVENOUS at 08:14

## 2024-06-20 RX ADMIN — DEXAMETHASONE SODIUM PHOSPHATE 6 MG: 4 INJECTION, SOLUTION INTRA-ARTICULAR; INTRALESIONAL; INTRAMUSCULAR; INTRAVENOUS; SOFT TISSUE at 07:35

## 2024-06-20 RX ADMIN — SODIUM CHLORIDE, SODIUM GLUCONATE, SODIUM ACETATE, POTASSIUM CHLORIDE AND MAGNESIUM CHLORIDE: 526; 502; 368; 37; 30 INJECTION, SOLUTION INTRAVENOUS at 07:32

## 2024-06-20 RX ADMIN — Medication 20 MG: at 11:00

## 2024-06-20 RX ADMIN — PHENYLEPHRINE HYDROCHLORIDE 100 MCG: 10 INJECTION INTRAVENOUS at 11:43

## 2024-06-20 RX ADMIN — EPHEDRINE SULFATE 5 MG: 5 INJECTION INTRAVENOUS at 08:49

## 2024-06-20 RX ADMIN — Medication 10 MG: at 09:50

## 2024-06-20 RX ADMIN — PHENYLEPHRINE HYDROCHLORIDE 100 MCG: 10 INJECTION INTRAVENOUS at 11:03

## 2024-06-20 RX ADMIN — PHENYLEPHRINE HYDROCHLORIDE 200 MCG: 10 INJECTION INTRAVENOUS at 12:28

## 2024-06-20 RX ADMIN — FENTANYL CITRATE 50 MCG: 50 INJECTION INTRAMUSCULAR; INTRAVENOUS at 07:57

## 2024-06-20 RX ADMIN — Medication 30 MG: at 08:52

## 2024-06-20 RX ADMIN — Medication 20 MG: at 11:55

## 2024-06-20 RX ADMIN — FENTANYL CITRATE 50 MCG: 50 INJECTION, SOLUTION INTRAMUSCULAR; INTRAVENOUS at 06:39

## 2024-06-20 RX ADMIN — GRANISETRON HYDROCHLORIDE 1 MG: 1 INJECTION, SOLUTION INTRAVENOUS at 07:03

## 2024-06-20 RX ADMIN — PHENYLEPHRINE HYDROCHLORIDE 100 MCG: 10 INJECTION INTRAVENOUS at 07:48

## 2024-06-20 RX ADMIN — FENTANYL CITRATE 50 MCG: 50 INJECTION INTRAMUSCULAR; INTRAVENOUS at 08:20

## 2024-06-20 RX ADMIN — PROCHLORPERAZINE EDISYLATE 5 MG: 5 INJECTION INTRAMUSCULAR; INTRAVENOUS at 20:49

## 2024-06-20 RX ADMIN — PHENYLEPHRINE HYDROCHLORIDE 100 MCG: 10 INJECTION INTRAVENOUS at 11:25

## 2024-06-20 RX ADMIN — KETOROLAC TROMETHAMINE 15 MG: 30 INJECTION, SOLUTION INTRAMUSCULAR at 20:23

## 2024-06-20 RX ADMIN — SODIUM CHLORIDE, POTASSIUM CHLORIDE, SODIUM LACTATE AND CALCIUM CHLORIDE: 600; 310; 30; 20 INJECTION, SOLUTION INTRAVENOUS at 21:51

## 2024-06-20 RX ADMIN — Medication 2 G: at 11:41

## 2024-06-20 RX ADMIN — SODIUM CHLORIDE, SODIUM GLUCONATE, SODIUM ACETATE, POTASSIUM CHLORIDE AND MAGNESIUM CHLORIDE: 526; 502; 368; 37; 30 INJECTION, SOLUTION INTRAVENOUS at 12:03

## 2024-06-20 RX ADMIN — FAMOTIDINE 20 MG: 10 INJECTION, SOLUTION INTRAVENOUS at 18:42

## 2024-06-20 RX ADMIN — METRONIDAZOLE 500 MG: 500 INJECTION, SOLUTION INTRAVENOUS at 06:18

## 2024-06-20 RX ADMIN — Medication 30 MG: at 07:34

## 2024-06-20 RX ADMIN — Medication: at 14:40

## 2024-06-20 RX ADMIN — EPHEDRINE SULFATE 5 MG: 5 INJECTION INTRAVENOUS at 09:45

## 2024-06-20 RX ADMIN — PHENYLEPHRINE HYDROCHLORIDE 100 MCG: 10 INJECTION INTRAVENOUS at 11:52

## 2024-06-20 RX ADMIN — CELECOXIB 200 MG: 200 CAPSULE ORAL at 06:19

## 2024-06-20 RX ADMIN — HYDROMORPHONE HYDROCHLORIDE 0.5 MG: 1 INJECTION, SOLUTION INTRAMUSCULAR; INTRAVENOUS; SUBCUTANEOUS at 12:50

## 2024-06-20 RX ADMIN — EPHEDRINE SULFATE 5 MG: 5 INJECTION INTRAVENOUS at 09:57

## 2024-06-20 RX ADMIN — FENTANYL CITRATE 50 MCG: 50 INJECTION INTRAMUSCULAR; INTRAVENOUS at 08:26

## 2024-06-20 RX ADMIN — EPHEDRINE SULFATE 5 MG: 5 INJECTION INTRAVENOUS at 08:56

## 2024-06-20 RX ADMIN — Medication 10 MG: at 11:56

## 2024-06-20 RX ADMIN — SODIUM CHLORIDE, POTASSIUM CHLORIDE, SODIUM LACTATE AND CALCIUM CHLORIDE: 600; 310; 30; 20 INJECTION, SOLUTION INTRAVENOUS at 08:00

## 2024-06-20 RX ADMIN — PHENYLEPHRINE HYDROCHLORIDE 100 MCG: 10 INJECTION INTRAVENOUS at 12:02

## 2024-06-20 RX ADMIN — SODIUM CHLORIDE, POTASSIUM CHLORIDE, SODIUM LACTATE AND CALCIUM CHLORIDE: 600; 310; 30; 20 INJECTION, SOLUTION INTRAVENOUS at 14:10

## 2024-06-20 RX ADMIN — ENOXAPARIN SODIUM 40 MG: 40 INJECTION SUBCUTANEOUS at 06:50

## 2024-06-20 RX ADMIN — FENTANYL CITRATE 50 MCG: 50 INJECTION INTRAMUSCULAR; INTRAVENOUS at 08:11

## 2024-06-20 RX ADMIN — ACETAMINOPHEN 1000 MG: 325 SOLUTION ORAL at 14:57

## 2024-06-20 RX ADMIN — LIDOCAINE HYDROCHLORIDE 100 MG: 20 INJECTION, SOLUTION INFILTRATION; PERINEURAL at 07:34

## 2024-06-20 RX ADMIN — SODIUM CHLORIDE, SODIUM GLUCONATE, SODIUM ACETATE, POTASSIUM CHLORIDE AND MAGNESIUM CHLORIDE: 526; 502; 368; 37; 30 INJECTION, SOLUTION INTRAVENOUS at 10:51

## 2024-06-20 RX ADMIN — SODIUM CHLORIDE, SODIUM GLUCONATE, SODIUM ACETATE, POTASSIUM CHLORIDE AND MAGNESIUM CHLORIDE: 526; 502; 368; 37; 30 INJECTION, SOLUTION INTRAVENOUS at 13:13

## 2024-06-20 RX ADMIN — CALCIUM CHLORIDE INJECTION 500 MG: 100 INJECTION, SOLUTION INTRAVENOUS at 12:04

## 2024-06-20 RX ADMIN — Medication 100 MG: at 13:19

## 2024-06-20 RX ADMIN — BUPIVACAINE 20 ML: 13.3 INJECTION, SUSPENSION, LIPOSOMAL INFILTRATION at 06:45

## 2024-06-20 RX ADMIN — Medication 10 MG: at 11:00

## 2024-06-20 RX ADMIN — Medication 50 MG: at 07:35

## 2024-06-20 RX ADMIN — PROPOFOL 50 MG: 10 INJECTION, EMULSION INTRAVENOUS at 07:34

## 2024-06-20 RX ADMIN — BUPIVACAINE HYDROCHLORIDE 20 ML: 2.5 INJECTION, SOLUTION EPIDURAL; INFILTRATION; INTRACAUDAL; PERINEURAL at 06:45

## 2024-06-20 RX ADMIN — ACETAMINOPHEN 1000 MG: 1000 INJECTION INTRAVENOUS at 23:42

## 2024-06-20 RX ADMIN — Medication 100 MG: at 13:23

## 2024-06-20 RX ADMIN — HYDROMORPHONE HYDROCHLORIDE 0.2 MG: 0.2 INJECTION, SOLUTION INTRAMUSCULAR; INTRAVENOUS; SUBCUTANEOUS at 15:04

## 2024-06-20 RX ADMIN — PHENYLEPHRINE HYDROCHLORIDE 100 MCG: 10 INJECTION INTRAVENOUS at 11:34

## 2024-06-20 RX ADMIN — Medication 10 MG: at 08:30

## 2024-06-20 ASSESSMENT — ENCOUNTER SYMPTOMS
SEIZURES: 0
DYSRHYTHMIAS: 0

## 2024-06-20 ASSESSMENT — ACTIVITIES OF DAILY LIVING (ADL)
ADLS_ACUITY_SCORE: 29
ADLS_ACUITY_SCORE: 29
ADLS_ACUITY_SCORE: 30
ADLS_ACUITY_SCORE: 29
ADLS_ACUITY_SCORE: 30
ADLS_ACUITY_SCORE: 29
ADLS_ACUITY_SCORE: 29
ADLS_ACUITY_SCORE: 30
ADLS_ACUITY_SCORE: 29
ADLS_ACUITY_SCORE: 30
ADLS_ACUITY_SCORE: 29
ADLS_ACUITY_SCORE: 30
ADLS_ACUITY_SCORE: 29
ADLS_ACUITY_SCORE: 30

## 2024-06-20 ASSESSMENT — LIFESTYLE VARIABLES: TOBACCO_USE: 0

## 2024-06-20 NOTE — ANESTHESIA PROCEDURE NOTES
Airway       Patient location during procedure: OR       Procedure Start/Stop Times: 6/20/2024 7:40 AM  Staff -        CRNA: Jethro Mccrary APRN CRNA       Performed By: CRNA  Consent for Airway        Urgency: elective  Indications and Patient Condition       Indications for airway management: staci-procedural       Induction type:intravenous       Mask difficulty assessment: 2 - vent by mask + OA or adjuvant +/- NMBA    Final Airway Details       Final airway type: endotracheal airway       Successful airway: ETT - single  Endotracheal Airway Details        ETT size (mm): 7.5       Cuffed: yes       Successful intubation technique: direct laryngoscopy       DL Blade Type: MAC 3       Grade View of Cords: 2       Adjucts: stylet       Position: Right       Measured from: lips       Secured at (cm): 23    Post intubation assessment        Placement verified by: capnometry, equal breath sounds and chest rise        Number of attempts at approach: 1       Secured with: tape       Ease of procedure: easy       Dentition: Intact and Unchanged    Medication(s) Administered   Medication Administration Time: 6/20/2024 7:40 AM

## 2024-06-20 NOTE — OR NURSING
Spoke with inpatient pharmacy; ketamine gtt will be ready in 30-45 mins. They will deliver to  when ready; will transport patient to room now. Bedside RN notified via [employer].  Gasper Zhou RN on 6/20/2024 at 4:15 PM

## 2024-06-20 NOTE — OR NURSING
RN spoke with Dr. Hernadez and discussed pain control.  Pt stating his pain is a 10/10, moaning but very sensitive to the opioids. Toradol 15mg and Tylenol 1000mg liquid given.  Ice to abdominal incision.  Dilaudid PCA connected.

## 2024-06-20 NOTE — PROGRESS NOTES
Patient has occasional postural vertigo at baseline. Worse when laying on left side. Worse when laying with head of bed flat.

## 2024-06-20 NOTE — ANESTHESIA CARE TRANSFER NOTE
Patient: Juan Delarosa    Procedure: Procedure(s):  OPEN PARTIAL CYTOREDUCTIVE SURGERY, WITH HYPERTHERMIC INTRAPERITONEAL CHEMOTHERAPY       Diagnosis: Pseudomyxoma peritonei (H) [C78.6]  Diagnosis Additional Information: No value filed.    Anesthesia Type:   General     Note:    Oropharynx: oropharynx clear of all foreign objects and spontaneously breathing  Level of Consciousness: drowsy  Oxygen Supplementation: nasal cannula    Independent Airway: airway patency satisfactory and stable  Dentition: dentition unchanged  Vital Signs Stable: post-procedure vital signs reviewed and stable  Report to RN Given: handoff report given  Patient transferred to: PACU    Handoff Report: Identifed the Patient, Identified the Reponsible Provider, Reviewed the pertinent medical history, Discussed the surgical course, Reviewed Intra-OP anesthesia mangement and issues during anesthesia, Set expectations for post-procedure period and Allowed opportunity for questions and acknowledgement of understanding      Vitals:  Vitals Value Taken Time   /65 06/20/24 1345   Temp     Pulse 83 06/20/24 1350   Resp 14 06/20/24 1350   SpO2 94 % 06/20/24 1350   Vitals shown include unfiled device data.    Electronically Signed By: LISA Mosqueda CRNA  June 20, 2024  1:51 PM

## 2024-06-20 NOTE — PLAN OF CARE
Admitted this 78 yr old male from OR/PACU via litter at 1630 and escorted by PACU and transport personnel. S/POpen cytoreductive surgery, Hyperthermic intraperitoneal chemotherapy, Infracolic omentectomy.Partial cecectomy. Made comfortable in bed. Patient appeared sedated but op[ens eyes with verbal stimuli and verbally responsive. Claimed pain is tolerable but gave self 1 bump of dilaudid which offered good relief thereafter. Placed on capnography. Episodes of decreasing ETCo2  values noted  as well as RR. Colorectal surgery team made aware. New order for dilaudid PCA not carried and was discontinued by MD, Placed another PCA dilaudid order. He was seen and examined  by Dr. Butcher. VBG done. Resting in bed as of this time.   P: continue to monitor patient closely.

## 2024-06-20 NOTE — ANESTHESIA PROCEDURE NOTES
Quadratus lumborum Procedure Note    Pre-Procedure   Staff -        Anesthesiologist:  Joseph Howard MD       Resident/Fellow: Ngoc Gruber MD       Other Anesthesia Staff: Diego Rivera MD       Performed By: resident       Location: pre-op       Procedure Start/Stop Times: 6/20/2024 6:45 AM and 6/20/2024 6:52 AM       Pre-Anesthestic Checklist: patient identified, IV checked, site marked, risks and benefits discussed, informed consent, monitors and equipment checked, pre-op evaluation, at physician/surgeon's request and post-op pain management  Timeout:       Correct Patient: Yes        Correct Procedure: Yes        Correct Site: Yes        Correct Position: Yes        Correct Laterality: Yes        Site Marked: Yes  Procedure Documentation  Procedure: Quadratus lumborum       Diagnosis: POSTOP PAIN       Laterality: bilateral       Patient Position: supine       Patient Prep/Sterile Barriers: sterile gloves, mask       Skin prep: Chloraprep       Needle Gauge: 21.        Needle Length (millimeters): 110        Ultrasound guided       1. Ultrasound was used to identify targeted nerve, plexus, vascular marker, or fascial plane and place a needle adjacent to it in real-time.       2. Ultrasound was used to visualize the spread of anesthetic in close proximity to the above referenced structure.       3. A permanent image is entered into the patient's record.    Assessment/Narrative         The placement was negative for: blood aspirated, painful injection and site bleeding       Paresthesias: No.       Bolus given via needle. no blood aspirated via catheter.        Secured via.        Insertion/Infusion Method: Single Shot       Complications: none    Medication(s) Administered   Bupivacaine 0.25% PF (Infiltration) - Infiltration   20 mL - 6/20/2024 6:45:00 AM  Bupivacaine liposome (Exparel) 1.3% LA inj susp (Infiltration) - Infiltration   20 mL - 6/20/2024 6:45:00 AM  Medication Administration Time: 6/20/2024 6:45  "AM      FOR [facility] ONLY:   Pain Team Contact information: please page the Pain Team Via [employer]. Search \"Pain\". During daytime hours, please page the attending first. At night please page the resident first.      "

## 2024-06-20 NOTE — ANESTHESIA PROCEDURE NOTES
Arterial Line Procedure Note    Pre-Procedure   Staff -        Anesthesiologist:  Scott Hernadez MD       Performed By: anesthesiologist       Location: OR       Pre-Anesthestic Checklist: patient identified, IV checked, risks and benefits discussed, informed consent, monitors and equipment checked, pre-op evaluation and at physician/surgeon's request  Timeout:       Correct Patient: Yes        Correct Procedure: Yes        Correct Site: Yes        Correct Position: Yes   Line Placement:   This line was placed Post Induction  Procedure   Procedure: arterial line       Laterality: left       Insertion Site: radial.  Sterile Prep        Standard elements of sterile barrier followed  Narrative         Secured by: other       Tegaderm dressing used.       Complications: None apparent,        Arterial waveform: Yes    Comments:  20 G Jelco

## 2024-06-20 NOTE — ANESTHESIA POSTPROCEDURE EVALUATION
Patient: Juan Delarosa    Procedure: Procedure(s):  OPEN PARTIAL CYTOREDUCTIVE SURGERY, WITH HYPERTHERMIC INTRAPERITONEAL CHEMOTHERAPY       Anesthesia Type:  General    Note:  Disposition: Inpatient; Admission   Postop Pain Control: Uneventful            Sign Out: Well controlled pain   PONV: No   Neuro/Psych: Uneventful            Sign Out: Acceptable/Baseline neuro status   Airway/Respiratory: Uneventful            Sign Out: Acceptable/Baseline resp. status   CV/Hemodynamics: Uneventful            Sign Out: Acceptable CV status; No obvious hypovolemia; No obvious fluid overload   Other NRE: NONE   DID A NON-ROUTINE EVENT OCCUR? No           Last vitals:  Vitals Value Taken Time   /83 06/20/24 1415   Temp 35.1  C (95.1  F) 06/20/24 1424   Pulse 79 06/20/24 1428   Resp 17 06/20/24 1427   SpO2 96 % 06/20/24 1428   Vitals shown include unfiled device data.    Electronically Signed By: Scott Hernadez MD  June 20, 2024  2:29 PM

## 2024-06-20 NOTE — ANESTHESIA PREPROCEDURE EVALUATION
Anesthesia Pre-Procedure Evaluation    Patient: Juan Delarosa   MRN: [id number] : 1945        Procedure : Procedure(s):  OPEN CYTOREDUCTIVE SURGERY, WITH HYPERTHERMIC INTRAPERITONEAL CHEMOTHERAPY          Past Medical History:   Diagnosis Date    Ascending aorta dilation (H24)     CAD (coronary artery disease)     Concussion     Gastroesophageal reflux disease     HLD (hyperlipidemia)     HTN (hypertension)     Kidney stone     Malignant neoplasm of appendix (H)     PONV (postoperative nausea and vomiting)     Pseudomyxoma peritonei (H)     Vertigo       Past Surgical History:   Procedure Laterality Date    COLONOSCOPY      mulitple    IR MISCELLANEOUS PROCEDURE  2006    IR MISCELLANEOUS PROCEDURE  2006    KNEE SURGERY Bilateral     LAPAROSCOPIC APPENDECTOMY N/A 2024    Procedure: LAPAROSCOPY, DIAGNOSTIC, with peritineal cytology and appendectomy;  Surgeon: Justin Dela Cruz MD;  Location: [facility] OR    LUMBAR DISCECTOMY      PERCUTANEOUS NEPHROSTOMY      SHOULDER SURGERY        Allergies   Allergen Reactions    Blood Transfusion Related (Informational Only)      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  A delay in compatible RBCs may occur.    Gabapentin Dizziness      Social History     Tobacco Use    Smoking status: Never    Smokeless tobacco: Never   Substance Use Topics    Alcohol use: Never      Wt Readings from Last 1 Encounters:   24 88.2 kg (194 lb 7.1 oz)        Anesthesia Evaluation   Pt has had prior anesthetic. Type: General and MAC.    No history of anesthetic complications       ROS/MED HX  ENT/Pulmonary:  - neg pulmonary ROS  (-) tobacco use and recent URI   Neurologic: Comment: BPPV   (-) no seizures and no CVA   Cardiovascular: Comment: Borderline to mild aortic root enlargement. Moderate to severe ascending  aortic dilation measuring 4.8 cm in diameter.      (+) Dyslipidemia hypertension- Peripheral Vascular  Disease-- Other.  CAD -  - stent-2020. 1 Drug Eluting Stent. Taking blood thinners Pt has not received instructions: Instructions Given to patient: Planned 7 day hold for surgery.                            Previous cardiac testing   Echo: Date: 11/16/23 Results:    Stress Test:  Date: 12/4/23 Results:    ECG Reviewed:  Date: 5/14/24 Results:  Sinus rhythm  Low voltage QRS  Nonspecific T wave abnormality      Cath:  Date: 2020 Results:   (-) MANUEL and arrhythmias   METS/Exercise Tolerance: 4 - Raking leaves, gardening Comment: Currently walking a mile a day without exertional symptoms   Hematologic:     (+)       history of blood transfusion, no previous transfusion reaction, Known PRBC Anitbodies: Yes, - Anti-M,   (-) history of blood clots   Musculoskeletal:  - neg musculoskeletal ROS     GI/Hepatic: Comment: Appendix abnormality   (-) GERD   Renal/Genitourinary:     (+) renal disease,      Nephrolithiasis ,       Endo:  - neg endo ROS     Psychiatric/Substance Use:  - neg psychiatric ROS  (-) psychiatric history   Infectious Disease:  - neg infectious disease ROS     Malignancy:   (+) Malignancy, History of GI.GI CA  Active status post Surgery.      Other:  - neg other ROS          Physical Exam    Airway        Mallampati: III   TM distance: > 3 FB   Neck ROM: full   Mouth opening: > 3 cm    Respiratory Devices and Support         Dental     Comment: Patient reports no loose or chipped teeth        Cardiovascular          Rhythm and rate: regular and normal     Pulmonary           breath sounds clear to auscultation           OUTSIDE LABS:  CBC:   Lab Results   Component Value Date    WBC 7.8 05/29/2024    WBC 11.1 (H) 05/14/2024    HGB 12.9 (L) 05/29/2024    HGB 12.4 (L) 05/14/2024    HCT 39.0 (L) 05/29/2024    HCT 37.9 (L) 05/14/2024     05/29/2024     05/14/2024     BMP:   Lab Results   Component Value Date     05/29/2024     05/14/2024    POTASSIUM 4.9 05/29/2024    POTASSIUM 4.1  "05/14/2024    CHLORIDE 105 05/29/2024    CHLORIDE 105 05/14/2024    CO2 29 05/29/2024    CO2 23 05/14/2024    BUN 24.9 (H) 05/29/2024    BUN 19.5 05/14/2024    CR 0.96 05/29/2024    CR 0.86 05/14/2024    GLC 85 06/20/2024    GLC 81 05/29/2024     COAGS: No results found for: \"PTT\", \"INR\", \"FIBR\"  POC: No results found for: \"BGM\", \"HCG\", \"HCGS\"  HEPATIC:   Lab Results   Component Value Date    ALBUMIN 4.3 05/29/2024    PROTTOTAL 7.3 05/29/2024    ALT 16 05/29/2024    AST 23 05/29/2024    ALKPHOS 77 05/29/2024    BILITOTAL 0.4 05/29/2024     OTHER:   Lab Results   Component Value Date    LYN 9.6 05/29/2024    PHOS 3.3 05/14/2024    MAG 1.9 05/14/2024       Anesthesia Plan    ASA Status:  3    NPO Status:  NPO Appropriate    Anesthesia Type: General.     - Airway: ETT         Techniques and Equipment:     - Airway: Video-Laryngoscope     - Lines/Monitors: 2nd IV, Arterial Line     - Drips/Meds: Norepi     Consents    Anesthesia Plan(s) and associated risks, benefits, and realistic alternatives discussed. Questions answered and patient/representative(s) expressed understanding.     - Discussed: Risks, Benefits and Alternatives for the PROCEDURE were discussed     - Discussed with:  Patient      - Extended Intubation/Ventilatory Support Discussed: No.      - Patient is DNR/DNI Status: No     Use of blood products discussed: Yes.     - Discussed with: Patient.     - Consented: consented to blood products     Postoperative Care       PONV prophylaxis: Background Propofol Infusion, Aprepitant, Dexamethasone or Solumedrol, Ondansetron (or other 5HT-3)     Comments:               Scott Hernadez MD    I have reviewed the pertinent notes and labs in the chart from the past 30 days and (re)examined the patient.  Any updates or changes from those notes are reflected in this note.             # Drug Induced Platelet Defect: home medication list includes an antiplatelet medication  # Overweight: Estimated body mass index is " "29.14 kg/m  as calculated from the following:    Height as of this encounter: 1.74 m (5' 8.5\").    Weight as of this encounter: 88.2 kg (194 lb 7.1 oz).      "

## 2024-06-20 NOTE — OP NOTE
PREOPERATIVE DIAGNOSIS:  1.  Peritoneal adenomucinosis.  2.  Metastatic low-grade appendiceal mucinous neoplasm.  3.  Dyslipidemia.  4.  Coronary artery disease.    PROCEDURE:  Open cytoreductive surgery.  Hyperthermic intraperitoneal chemotherapy.  Infracolic omentectomy.  Partial cecectomy.    ANESTHESIA: General endotracheal anesthesia plus bilateral TAP blocks.      SURGEON:  Justin Dela Cruz M.D.    ASSISTANTS: Cristina Crain M.D. (colon and rectal surgery fellow); FABIAN Klein (in addition to a fellow, a midlevel provider was required for this complex operation for additional assistance with bedside assistance, hemostasis, laparoscopic suctioning, and exposure).    General risks related to abdominal surgery were reviewed with the patient. These include, but are not limited to, death, myocardial infarction, pneumonia, urinary tract infection, deep venous thrombosis with or without pulmonary embolus, abdominal infection from bowel injury or abscess, fistula, anastomotic leak that may require reoperation and stoma, ureteral injury, urinary dysfunction, sexual dysfunction, bowel obstruction, wound infection, and bleeding.    OPERATIVE PROCEDURE: After obtaining informed consent, the patient was brought to the operating room and placed in the dorsal lithotomy position.  General endotracheal anesthesia was gently induced without difficulty.  The patient underwent placement of bilateral tap blocks.  Bilateral lower extremity pneumatic compression devices were applied and all pressure points were cushioned.  A Sanchez catheter was inserted.  The abdomen was then prepped and draped in the standard sterile fashion.  A timeout was performed.  A generous midline incision was placed and the peritoneal cavity was entered under direct vision.  We confirmed the findings of diffuse adeno mucinosis.  An XXL Maximo wound protector was placed and a Pena retractor was used for exposure.  The umbilicus was excised  completely all the way down to the fascia and sent for permanent pathology.  After this, the falciform ligament was excised completely and also sent for permanent pathology.  The liver was dissected off of the upper midline.  We then reexplored the entire peritoneal cavity including all parietal and visceral surfaces as well as the entire small bowel including its mesentery.  The PCI was 8.  Most of the mucinosis included the mesentery of the small bowel and the greater omentum.  There was no involvement of the hemidiaphragms or spleen.  We then performed an infracolic omentectomy with a combination of monopolar electrocautery and a hand-held LigaSure device.  The specimen was sent for permanent pathology.  We fully mobilized the cecum and ascending colon as well as the terminal ileum off of the retroperitoneum.  There was some additional mucinosis at the appendectomy site.  This was slightly nodular; therefore, I decided to further mobilized the cecum and excise it flush at the ileocecal valve.  This was performed with a BRITTANY stapler (100 mm-blue load).  This was performed with 2 firings of the stapler.  The small bowel was ran again all the way to the ligament of Treitz.  There were some additional nodules at the right pelvis and the sigmoid colon mesentery and these were excised completely.  Complete cytoreduction was performed with a cytoreduction score of 0 only that there was some component of the mucinosis at the small bowel mesentery that was scratched off with a dry 4 x 4 gauze.  The entire peritoneal cavity was reinspected.  Hemostasis was corroborated.  We then placed 2 28 Bulgarian inflow and 2 36 Bulgarian outflow catheters at the lateral abdomen through the abdominal wall as well as inflow and outflow temperature probes.  The catheters were placed in the peritoneal cavity to allow for even distribution of the chemotherapy agent.  The catheters were then secured to the abdominal skin with 2-0 nylon sutures.   The outflow catheters were protected with vaginal packing gauze and multiple silk ties.  The midline incision was temporarily closed with a running locking #2 nylon suture.  The peritoneal cavity was then primed with approximately 4 L of sterile saline solution and and then heated up to 39  C.  We then administered 40 mg of Mitomycin-C and continued circulating the fluid through a [place] pump for a total of 90 minutes.  We did not have any major issues with flows or temperature. The maximum outflow temperature was 41.2  C and the maximum core temperature was 36.7  C.  The patient did not require any active cooling.  After 90 minutes, the peritoneal cavity was flushed with approximately 4 L of warm sterile water.  The midline incision was reopened and all catheters were removed.  The peritoneal cavity was then irrigated with approximately 3 L of warm sterile water.  Hemostasis was corroborated.  We did run the small bowel twice to assure that there were no serosal tears or injuries.  The cecal staple line was reinforced with individual 4-0 silk Lembert sutures.  Instrument, sponge, needle counts were all correct as reported to me.  Hemostasis was corroborated.  The outflow catheter sites were closed at the fascial level with figure-of-eight 0 Vicryl sutures.  Multiple sheets of Seprafilm were left in the peritoneal cavity.  The midline fascia was reapproximated with a combination of a running #1 PDS suture and multiple #1 Prolene internal retention sutures.  Incisions were irrigated with dilute peroxide.  The dermis was reapproximated with 3-0 Vicryl sutures.  Skin incisions were reapproximated with running 4-0 PDS subcuticular sutures and Dermabond Prineo.  The patient tolerated the procedure well.    COMPLICATIONS: none, immediately.    ESTIMATED BLOOD LOSS: 200 mL.    REPLACEMENT:     - Crystalloid: 4500 mL.     - Colloid: 0 mL.     - Blood products: None.    SPECIMEN(S): Umbilicus, falciform ligament, peritoneal  nodules, omentum, partial cecectomy.    DRAINS/TUBES: Sanchez catheter.    OPERATIVE FINDINGS: PCI 8.  Completeness of cytoreduction score 0-1.    DISPOSITION: PACU.    Colon Resection  Operation performed with curative intent Yes   Tumor Location Cecum   Extent of colon and vascular resection Other cecectomy with cytoreductive surgery and hyperthermic intraperitoneal chemotherapy.       Justin Dela Cruz M.D., M.Sc.    Division of Colon & Rectal Surgery  [facility]  [phone] (p)  [phone] (o)    CC:  [facility] PASCUAL Ruiz PA Reggie Singh, MD Stannard, Mark D

## 2024-06-20 NOTE — PROGRESS NOTES
"  Colorectal Surgery Progress Note  Surgery Cross-Cover  Post Op Check    06/20/2024    Juan Delarosa is a 78 year old male POD#0 s/p Procedure(s):  OPEN PARTIAL CYTOREDUCTIVE SURGERY, WITH HYPERTHERMIC INTRAPERITONEAL CHEMOTHERAPY for Pre-Op Diagnosis Codes:     * Pseudomyxoma peritonei (H) [C78.6]    Pt reports their pain is moderately controlled with current regimen. Denies SOB, chest pain. Patient is not passing flatus or having bowel movements and Is voiding via urinary catheter. The patient has had a post-op course that was mildly complicated by periods of apneic episodes after arriving on the floor from PACU. Overall, has been improving in respiratory status.     BP (!) 145/75   Pulse 79   Temp 97.8  F (36.6  C) (Axillary)   Resp 12   Ht 1.74 m (5' 8.5\")   Wt 88.2 kg (194 lb 7.1 oz)   SpO2 92%   BMI 29.14 kg/m      Gen: A&O x4, NAD   Chest: breathing non-labored on nasal cannula at 2 liters per minute   Abdomen: soft, appropriately tender, non-distended  Incision: clean, dry, intact closed with dermabond. No oozing or drainage from wound sites.  Extremities: warm and well perfused.  Devices: Nasal cannula, Capnography, and michaud catheter with clear, yellow urine .    A/P:      Patient was having episodes of apnea after coming to floor. Decreased dilaudid PCA to 0.1mg continuous. Scheduled toradol q6hr. The patient Ordered a VBG for CO2 retention. The patient was also having nausea initially. Zofran and compazine were administered. The patient also had a one time dose of IV Tylenol ordered due to emesis of his oral solution. Over the course of the next 1-2 hours the patient began to have less apnea and nausea. We will plan to continue multimodal therapy. Continue mIVF. Abdominal binder in place. Activity ordered up with assist. NPO except for medications and ice chips. Plan to increase his PCA as needed. Spoke with Colorectal fellow regarding the plans. Continue plan of care per primary team. Please call " with any questions.    Joseph Butcher MD   Surgery cross-cover

## 2024-06-20 NOTE — BRIEF OP NOTE
[facility]    Brief Operative Note    Pre-operative diagnosis: Pseudomyxoma peritonei (H) [C78.6]  Post-operative diagnosis Same as pre-operative diagnosis    Procedure: OPEN CYTOREDUCTIVE SURGERY, WITH HYPERTHERMIC INTRAPERITONEAL CHEMOTHERAPY, N/A - Abdomen    Surgeon: Surgeons and Role:     * Justin Dela Cruz MD - Primary     * Cristina Crain MD - Fellow - Assisting  Anesthesia: General with Block   Estimated Blood Loss: 200 ml    Drains: None  Specimens:   ID Type Source Tests Collected by Time Destination   1 : Umbilicus Tissue Umbilicus SURGICAL PATHOLOGY EXAM Justin Dela Cruz MD 6/20/2024  8:12 AM    2 : Falciform ligament Tissue Other SURGICAL PATHOLOGY EXAM Justin Dela Cruz MD 6/20/2024  8:19 AM    3 : Peritoneal nodules Tissue Lymph Nodes, Pelvis, Regional SURGICAL PATHOLOGY EXAM Justin Dela Cruz MD 6/20/2024  8:33 AM    4 : Omentum Tissue Omentum SURGICAL PATHOLOGY EXAM Justin Dela Cruz MD 6/20/2024  8:41 AM    5 : Partial cecectomy Tissue Large Intestine, Colon, Cecum SURGICAL PATHOLOGY EXAM Justin Dela Cruz MD 6/20/2024  9:37 AM      Findings:   Mucinous disease in the pelvis and RLQ. Small nodular deposits on abdominal wall excised. Abscess cavity from perforation and prior appendectomy staple line resected with partial cecectomy.  .  Complications: None.  Implants: * No implants in log *

## 2024-06-20 NOTE — OR NURSING
Spoke with Dr. Rivera and discussed pain management, pt states his pain is not relieved at all after all interventions.  Pt does need to be reminded to breathe at times.  Dr. Rivera suggested starting a Ketamine gtt in PACU.

## 2024-06-21 ENCOUNTER — APPOINTMENT (OUTPATIENT)
Dept: PHYSICAL THERAPY | Facility: CLINIC | Age: 79
DRG: 330 | End: 2024-06-21
Attending: COLON & RECTAL SURGERY
Payer: MEDICARE

## 2024-06-21 LAB
ALBUMIN SERPL BCG-MCNC: 3.1 G/DL (ref 3.5–5.2)
ALP SERPL-CCNC: 52 U/L (ref 40–150)
ALT SERPL W P-5'-P-CCNC: 14 U/L (ref 0–70)
ANION GAP SERPL CALCULATED.3IONS-SCNC: 13 MMOL/L (ref 7–15)
AST SERPL W P-5'-P-CCNC: 33 U/L (ref 0–45)
BILIRUB SERPL-MCNC: 0.3 MG/DL
BUN SERPL-MCNC: 19.9 MG/DL (ref 8–23)
CALCIUM SERPL-MCNC: 7.9 MG/DL (ref 8.8–10.2)
CHLORIDE SERPL-SCNC: 106 MMOL/L (ref 98–107)
CREAT SERPL-MCNC: 1 MG/DL (ref 0.67–1.17)
DEPRECATED HCO3 PLAS-SCNC: 16 MMOL/L (ref 22–29)
EGFRCR SERPLBLD CKD-EPI 2021: 77 ML/MIN/1.73M2
ERYTHROCYTE [DISTWIDTH] IN BLOOD BY AUTOMATED COUNT: 14.1 % (ref 10–15)
GLUCOSE BLDC GLUCOMTR-MCNC: 116 MG/DL (ref 70–99)
GLUCOSE SERPL-MCNC: 142 MG/DL (ref 70–99)
HCT VFR BLD AUTO: 40.5 % (ref 40–53)
HGB BLD-MCNC: 13.6 G/DL (ref 13.3–17.7)
INR PPP: 1.31 (ref 0.85–1.15)
MAGNESIUM SERPL-MCNC: 2.2 MG/DL (ref 1.7–2.3)
MCH RBC QN AUTO: 30.8 PG (ref 26.5–33)
MCHC RBC AUTO-ENTMCNC: 33.6 G/DL (ref 31.5–36.5)
MCV RBC AUTO: 92 FL (ref 78–100)
PHOSPHATE SERPL-MCNC: 2.4 MG/DL (ref 2.5–4.5)
PLATELET # BLD AUTO: 246 10E3/UL (ref 150–450)
POTASSIUM SERPL-SCNC: 4.4 MMOL/L (ref 3.4–5.3)
PROT SERPL-MCNC: 5.5 G/DL (ref 6.4–8.3)
RBC # BLD AUTO: 4.41 10E6/UL (ref 4.4–5.9)
SODIUM SERPL-SCNC: 135 MMOL/L (ref 135–145)
WBC # BLD AUTO: 15.7 10E3/UL (ref 4–11)

## 2024-06-21 PROCEDURE — 80053 COMPREHEN METABOLIC PANEL: CPT | Performed by: COLON & RECTAL SURGERY

## 2024-06-21 PROCEDURE — 97530 THERAPEUTIC ACTIVITIES: CPT | Mod: GP

## 2024-06-21 PROCEDURE — 258N000003 HC RX IP 258 OP 636: Mod: JZ | Performed by: COLON & RECTAL SURGERY

## 2024-06-21 PROCEDURE — 250N000011 HC RX IP 250 OP 636: Mod: JZ

## 2024-06-21 PROCEDURE — 84100 ASSAY OF PHOSPHORUS: CPT | Performed by: COLON & RECTAL SURGERY

## 2024-06-21 PROCEDURE — 250N000013 HC RX MED GY IP 250 OP 250 PS 637: Performed by: COLON & RECTAL SURGERY

## 2024-06-21 PROCEDURE — 83735 ASSAY OF MAGNESIUM: CPT | Performed by: COLON & RECTAL SURGERY

## 2024-06-21 PROCEDURE — 36415 COLL VENOUS BLD VENIPUNCTURE: CPT | Performed by: COLON & RECTAL SURGERY

## 2024-06-21 PROCEDURE — 97161 PT EVAL LOW COMPLEX 20 MIN: CPT | Mod: GP

## 2024-06-21 PROCEDURE — 85041 AUTOMATED RBC COUNT: CPT | Performed by: COLON & RECTAL SURGERY

## 2024-06-21 PROCEDURE — 250N000013 HC RX MED GY IP 250 OP 250 PS 637

## 2024-06-21 PROCEDURE — 85610 PROTHROMBIN TIME: CPT | Performed by: COLON & RECTAL SURGERY

## 2024-06-21 PROCEDURE — 120N000002 HC R&B MED SURG/OB UMMC

## 2024-06-21 PROCEDURE — 250N000011 HC RX IP 250 OP 636: Mod: JZ | Performed by: COLON & RECTAL SURGERY

## 2024-06-21 RX ORDER — ACETAMINOPHEN 500 MG
1000 TABLET ORAL 4 TIMES DAILY
Status: DISCONTINUED | OUTPATIENT
Start: 2024-06-21 | End: 2024-06-25 | Stop reason: HOSPADM

## 2024-06-21 RX ADMIN — KETOROLAC TROMETHAMINE 15 MG: 30 INJECTION, SOLUTION INTRAMUSCULAR at 20:24

## 2024-06-21 RX ADMIN — KETOROLAC TROMETHAMINE 15 MG: 30 INJECTION, SOLUTION INTRAMUSCULAR at 08:43

## 2024-06-21 RX ADMIN — ENOXAPARIN SODIUM 40 MG: 40 INJECTION SUBCUTANEOUS at 15:51

## 2024-06-21 RX ADMIN — Medication: at 11:38

## 2024-06-21 RX ADMIN — LOSARTAN POTASSIUM 25 MG: 25 TABLET, FILM COATED ORAL at 21:44

## 2024-06-21 RX ADMIN — FAMOTIDINE 20 MG: 10 INJECTION, SOLUTION INTRAVENOUS at 05:45

## 2024-06-21 RX ADMIN — KETOROLAC TROMETHAMINE 15 MG: 30 INJECTION, SOLUTION INTRAMUSCULAR at 02:44

## 2024-06-21 RX ADMIN — POTASSIUM & SODIUM PHOSPHATES POWDER PACK 280-160-250 MG 1 PACKET: 280-160-250 PACK at 11:29

## 2024-06-21 RX ADMIN — FAMOTIDINE 20 MG: 10 INJECTION, SOLUTION INTRAVENOUS at 18:56

## 2024-06-21 RX ADMIN — ACETAMINOPHEN 1000 MG: 500 TABLET ORAL at 15:51

## 2024-06-21 RX ADMIN — POTASSIUM & SODIUM PHOSPHATES POWDER PACK 280-160-250 MG 1 PACKET: 280-160-250 PACK at 18:56

## 2024-06-21 RX ADMIN — SODIUM CHLORIDE, POTASSIUM CHLORIDE, SODIUM LACTATE AND CALCIUM CHLORIDE: 600; 310; 30; 20 INJECTION, SOLUTION INTRAVENOUS at 05:35

## 2024-06-21 RX ADMIN — ACETAMINOPHEN 1000 MG: 325 SOLUTION ORAL at 08:43

## 2024-06-21 RX ADMIN — ERTAPENEM SODIUM 1 G: 1 INJECTION, POWDER, LYOPHILIZED, FOR SOLUTION INTRAMUSCULAR; INTRAVENOUS at 08:51

## 2024-06-21 RX ADMIN — KETOROLAC TROMETHAMINE 15 MG: 30 INJECTION, SOLUTION INTRAMUSCULAR at 14:16

## 2024-06-21 RX ADMIN — ACETAMINOPHEN 1000 MG: 500 TABLET ORAL at 11:29

## 2024-06-21 RX ADMIN — POTASSIUM & SODIUM PHOSPHATES POWDER PACK 280-160-250 MG 1 PACKET: 280-160-250 PACK at 14:14

## 2024-06-21 RX ADMIN — SODIUM CHLORIDE, POTASSIUM CHLORIDE, SODIUM LACTATE AND CALCIUM CHLORIDE: 600; 310; 30; 20 INJECTION, SOLUTION INTRAVENOUS at 15:58

## 2024-06-21 ASSESSMENT — ACTIVITIES OF DAILY LIVING (ADL)
ADLS_ACUITY_SCORE: 37
ADLS_ACUITY_SCORE: 36
ADLS_ACUITY_SCORE: 37
ADLS_ACUITY_SCORE: 37
ADLS_ACUITY_SCORE: 36
ADLS_ACUITY_SCORE: 37
ADLS_ACUITY_SCORE: 36
ADLS_ACUITY_SCORE: 37
ADLS_ACUITY_SCORE: 36
ADLS_ACUITY_SCORE: 37
ADLS_ACUITY_SCORE: 36
ADLS_ACUITY_SCORE: 36
ADLS_ACUITY_SCORE: 37

## 2024-06-21 NOTE — PROVIDER NOTIFICATION
MD name : cari alarcon: pt.pain score is 10/10. drowsy, RR-13, CO2 is Low. very sensitive to dilaudid and fentanyl.. had apneic episodes in PACU. do you think pca dilaudid is a good idea? thank you   MD reply: don't start PCA dilaudid, may give scheduled ketorolac. I will come to assess the pt

## 2024-06-21 NOTE — PLAN OF CARE
Cares from: [id number]    V/S & pain: vitally stable, pain on abdomen post sx  Neuro: alert, orientedx4  Respiratory: on oxymask at 3lpm  Skin:  mid surgical incision in the abdomen with abdominal binder  GI/: with michaud catheter  Nutrition: npo  Lines/drains: LPIV with ongoing LR at 125ml/hr  Activity: assist of 1    Events this shift:wife stayed over night. Received pt a bit drowsy, wakes up with verbal stimuli ,RR goes down( 12-13), low capno..   per report from previous shift, pt is very sensitive to dilaudid and fentanyl, had apneic episode in PACU. Paged MD to clarify if it still needed to start PCA dilaudid. Crosscover team came and examined pt and dicontinued PCA order.  Pt. Cannot sleep, capno kept on beeping showing apneic episodes but over night pt. appears much more awake, alert,oriented and conversant to wife without dosing off, capno -29-30. Hooked to oxymask at 3lpm , pt is tolerating well.     Seen and examined by [facility] team and verbally ordered may remove capno and keep oxymask.   Call lights within reach and able to make need know. Rounds done. Both siderails up.      Plan:  pain management

## 2024-06-21 NOTE — PROVIDER NOTIFICATION
MD name:cari Butcher: pt. cannot sleep due to capno beeping everytime . it says apnea but pt. is alert, oriented and does not complain of anything. would you like to discontinue capno? thank you  MD reply: MD came and examined pt. Remove capno. Keep oxymask

## 2024-06-21 NOTE — PROGRESS NOTES
"Colon and Rectal Surgery  Daily Progress Note    Subjective  Patient reports feeling \"okay\" this morning. He states his current pain is 5/10 in severity compared to 10/10 yesterday night. He denies any nausea or vomiting. Also denies passing any flatus or having any bowel movements. Has not ambulated yet secondary to pain.     Objective  Intake/Output last 24 hrs:    Intake/Output Summary (Last 24 hours) at 6/21/2024 0744  Last data filed at 6/21/2024 0554  Gross per 24 hour   Intake 4500 ml   Output 1435 ml   Net 3065 ml     Temp:  [97.5  F (36.4  C)-98.3  F (36.8  C)] 98.3  F (36.8  C)  Pulse:  [67-86] 67  Resp:  [10-18] 18  BP: ()/(72-88) 120/73  SpO2:  [92 %-99 %] 99 %    Physical Exam:  General: awake, alert, comfortable in bed, in no acute distress  Head: normocephalic, atraumatic  Respiratory: non-labored breathing  Abdomen: soft, mildly tender to deep palpation, non-distended. No peritoneal signs.              Incisions: clean, dry and intact  Skin: No rashes or lesions  Musculoskeletal: moves all four extremities equally  Psychological: alert and oriented, answers questions appropriately    Pertinent Labs  Lab Results: personally reviewed.  Lab Results   Component Value Date     06/20/2024     06/20/2024     05/29/2024     05/14/2024    CO2 16 06/20/2024    CO2 29 05/29/2024    CO2 23 05/14/2024    BUN 18.4 06/20/2024    BUN 24.9 05/29/2024    BUN 19.5 05/14/2024     Lab Results   Component Value Date    WBC 15.7 06/21/2024    WBC 7.8 05/29/2024    WBC 11.1 05/14/2024    HGB 13.6 06/21/2024    HGB 14.1 06/20/2024    HGB 14.6 06/20/2024    HGB 12.9 05/29/2024    HCT 40.5 06/21/2024    HCT 39.0 05/29/2024    HCT 37.9 05/14/2024    MCV 92 06/21/2024    MCV 93 05/29/2024    MCV 93 05/14/2024     06/21/2024     05/29/2024     05/14/2024       Assessment/Plan: This is a 78 year old male POD #1 s/p open reductive surgery and hyperthermic intraperitoneal " chemotherapy for 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.     Patient progressing appropriately. Over night there were some difficulties with pain control due to patient being sensitive to narcotics and having episodes of apnea requiring Dc'ing PCA.  Will continue adjust pain control PRN. Vital signs within normal limits. No return of bowel function yet. No N/V or bloating.     - Modified Dilaudid PCA dosing to 0.2 mg bolus with 10 minutes lockout. Removed continuous administration.   - Continue other multimodal pain control including scheduled Toradol 15 mg Q6  - Continue NPO, ice chips today; monitor for ROBF  - Continue LR at 100 mL/hr  - Continue michaud  - One dose of ertapenem today for perioperative ppx  - Encourage IS and ambulation 4-5x a day  - Start Lovenox today DVT ppx    Discussed with Dr. Dela Cruz and Colorectal team.    Wyatt Garcia, MS4    Sruthi HANEY  PGY1 General Surgery  [facility]

## 2024-06-21 NOTE — PROVIDER NOTIFICATION
MD name: cari Butcher: Scheduled IV ketorolac has been discontinued. only PRN robaxin 500 mg. would you like to add pain meds? thank you  MD reply: pending

## 2024-06-22 ENCOUNTER — APPOINTMENT (OUTPATIENT)
Dept: OCCUPATIONAL THERAPY | Facility: CLINIC | Age: 79
DRG: 330 | End: 2024-06-22
Attending: COLON & RECTAL SURGERY
Payer: MEDICARE

## 2024-06-22 LAB
ALBUMIN SERPL BCG-MCNC: 3.1 G/DL (ref 3.5–5.2)
ALP SERPL-CCNC: 47 U/L (ref 40–150)
ALT SERPL W P-5'-P-CCNC: 10 U/L (ref 0–70)
ANION GAP SERPL CALCULATED.3IONS-SCNC: 8 MMOL/L (ref 7–15)
AST SERPL W P-5'-P-CCNC: 29 U/L (ref 0–45)
BILIRUB SERPL-MCNC: 0.3 MG/DL
BUN SERPL-MCNC: 15.5 MG/DL (ref 8–23)
CALCIUM SERPL-MCNC: 7.9 MG/DL (ref 8.8–10.2)
CHLORIDE SERPL-SCNC: 109 MMOL/L (ref 98–107)
CREAT SERPL-MCNC: 0.86 MG/DL (ref 0.67–1.17)
DEPRECATED HCO3 PLAS-SCNC: 22 MMOL/L (ref 22–29)
EGFRCR SERPLBLD CKD-EPI 2021: 88 ML/MIN/1.73M2
ERYTHROCYTE [DISTWIDTH] IN BLOOD BY AUTOMATED COUNT: 14.6 % (ref 10–15)
GLUCOSE SERPL-MCNC: 97 MG/DL (ref 70–99)
HCT VFR BLD AUTO: 36.6 % (ref 40–53)
HGB BLD-MCNC: 11.9 G/DL (ref 13.3–17.7)
INR PPP: 1.25 (ref 0.85–1.15)
MAGNESIUM SERPL-MCNC: 2.2 MG/DL (ref 1.7–2.3)
MCH RBC QN AUTO: 30.4 PG (ref 26.5–33)
MCHC RBC AUTO-ENTMCNC: 32.5 G/DL (ref 31.5–36.5)
MCV RBC AUTO: 94 FL (ref 78–100)
PHOSPHATE SERPL-MCNC: 2.1 MG/DL (ref 2.5–4.5)
PLATELET # BLD AUTO: 208 10E3/UL (ref 150–450)
POTASSIUM SERPL-SCNC: 4 MMOL/L (ref 3.4–5.3)
PROT SERPL-MCNC: 5.5 G/DL (ref 6.4–8.3)
RBC # BLD AUTO: 3.91 10E6/UL (ref 4.4–5.9)
SODIUM SERPL-SCNC: 139 MMOL/L (ref 135–145)
WBC # BLD AUTO: 11.5 10E3/UL (ref 4–11)

## 2024-06-22 PROCEDURE — 258N000003 HC RX IP 258 OP 636: Mod: JZ | Performed by: STUDENT IN AN ORGANIZED HEALTH CARE EDUCATION/TRAINING PROGRAM

## 2024-06-22 PROCEDURE — 80053 COMPREHEN METABOLIC PANEL: CPT | Performed by: COLON & RECTAL SURGERY

## 2024-06-22 PROCEDURE — 250N000011 HC RX IP 250 OP 636: Mod: JZ | Performed by: COLON & RECTAL SURGERY

## 2024-06-22 PROCEDURE — 999N000128 HC STATISTIC PERIPHERAL IV START W/O US GUIDANCE

## 2024-06-22 PROCEDURE — 84100 ASSAY OF PHOSPHORUS: CPT | Performed by: COLON & RECTAL SURGERY

## 2024-06-22 PROCEDURE — 97535 SELF CARE MNGMENT TRAINING: CPT | Mod: GO

## 2024-06-22 PROCEDURE — 999N000248 HC STATISTIC IV INSERT WITH US BY RN

## 2024-06-22 PROCEDURE — 85027 COMPLETE CBC AUTOMATED: CPT | Performed by: COLON & RECTAL SURGERY

## 2024-06-22 PROCEDURE — 250N000011 HC RX IP 250 OP 636: Mod: JZ

## 2024-06-22 PROCEDURE — 97530 THERAPEUTIC ACTIVITIES: CPT | Mod: GO

## 2024-06-22 PROCEDURE — 250N000013 HC RX MED GY IP 250 OP 250 PS 637: Performed by: COLON & RECTAL SURGERY

## 2024-06-22 PROCEDURE — 85610 PROTHROMBIN TIME: CPT | Performed by: COLON & RECTAL SURGERY

## 2024-06-22 PROCEDURE — 250N000013 HC RX MED GY IP 250 OP 250 PS 637

## 2024-06-22 PROCEDURE — 36415 COLL VENOUS BLD VENIPUNCTURE: CPT | Performed by: COLON & RECTAL SURGERY

## 2024-06-22 PROCEDURE — 97165 OT EVAL LOW COMPLEX 30 MIN: CPT | Mod: GO

## 2024-06-22 PROCEDURE — 120N000002 HC R&B MED SURG/OB UMMC

## 2024-06-22 PROCEDURE — 258N000003 HC RX IP 258 OP 636: Mod: JZ | Performed by: COLON & RECTAL SURGERY

## 2024-06-22 PROCEDURE — 83735 ASSAY OF MAGNESIUM: CPT | Performed by: COLON & RECTAL SURGERY

## 2024-06-22 RX ORDER — SODIUM CHLORIDE, SODIUM LACTATE, POTASSIUM CHLORIDE, CALCIUM CHLORIDE 600; 310; 30; 20 MG/100ML; MG/100ML; MG/100ML; MG/100ML
INJECTION, SOLUTION INTRAVENOUS CONTINUOUS
Status: DISCONTINUED | OUTPATIENT
Start: 2024-06-22 | End: 2024-06-24

## 2024-06-22 RX ADMIN — ACETAMINOPHEN 1000 MG: 500 TABLET ORAL at 12:57

## 2024-06-22 RX ADMIN — POTASSIUM & SODIUM PHOSPHATES POWDER PACK 280-160-250 MG 1 PACKET: 280-160-250 PACK at 16:02

## 2024-06-22 RX ADMIN — ACETAMINOPHEN 1000 MG: 500 TABLET ORAL at 01:22

## 2024-06-22 RX ADMIN — FAMOTIDINE 20 MG: 10 INJECTION, SOLUTION INTRAVENOUS at 18:29

## 2024-06-22 RX ADMIN — KETOROLAC TROMETHAMINE 15 MG: 30 INJECTION, SOLUTION INTRAMUSCULAR at 20:13

## 2024-06-22 RX ADMIN — Medication 20000 UNITS: at 20:12

## 2024-06-22 RX ADMIN — ACETAMINOPHEN 1000 MG: 500 TABLET ORAL at 20:12

## 2024-06-22 RX ADMIN — KETOROLAC TROMETHAMINE 15 MG: 30 INJECTION, SOLUTION INTRAMUSCULAR at 02:28

## 2024-06-22 RX ADMIN — Medication 20000 UNITS: at 09:05

## 2024-06-22 RX ADMIN — FAMOTIDINE 20 MG: 10 INJECTION, SOLUTION INTRAVENOUS at 07:03

## 2024-06-22 RX ADMIN — ACETAMINOPHEN 1000 MG: 500 TABLET ORAL at 16:02

## 2024-06-22 RX ADMIN — POTASSIUM & SODIUM PHOSPHATES POWDER PACK 280-160-250 MG 1 PACKET: 280-160-250 PACK at 22:06

## 2024-06-22 RX ADMIN — Medication: at 10:37

## 2024-06-22 RX ADMIN — SODIUM CHLORIDE, POTASSIUM CHLORIDE, SODIUM LACTATE AND CALCIUM CHLORIDE: 600; 310; 30; 20 INJECTION, SOLUTION INTRAVENOUS at 01:24

## 2024-06-22 RX ADMIN — KETOROLAC TROMETHAMINE 15 MG: 30 INJECTION, SOLUTION INTRAMUSCULAR at 14:19

## 2024-06-22 RX ADMIN — ENOXAPARIN SODIUM 40 MG: 40 INJECTION SUBCUTANEOUS at 16:02

## 2024-06-22 RX ADMIN — ACETAMINOPHEN 1000 MG: 500 TABLET ORAL at 10:37

## 2024-06-22 RX ADMIN — KETOROLAC TROMETHAMINE 15 MG: 30 INJECTION, SOLUTION INTRAMUSCULAR at 09:05

## 2024-06-22 RX ADMIN — SODIUM CHLORIDE, POTASSIUM CHLORIDE, SODIUM LACTATE AND CALCIUM CHLORIDE: 600; 310; 30; 20 INJECTION, SOLUTION INTRAVENOUS at 12:59

## 2024-06-22 RX ADMIN — LOSARTAN POTASSIUM 25 MG: 25 TABLET, FILM COATED ORAL at 21:10

## 2024-06-22 RX ADMIN — POTASSIUM & SODIUM PHOSPHATES POWDER PACK 280-160-250 MG 1 PACKET: 280-160-250 PACK at 18:29

## 2024-06-22 ASSESSMENT — ACTIVITIES OF DAILY LIVING (ADL)
ADLS_ACUITY_SCORE: 37
IADL_COMMENTS: IND IN IADLS PRIOR TO ADMISSION
ADLS_ACUITY_SCORE: 36
ADLS_ACUITY_SCORE: 37
ADLS_ACUITY_SCORE: 35
ADLS_ACUITY_SCORE: 37
ADLS_ACUITY_SCORE: 35
ADLS_ACUITY_SCORE: 37
PREVIOUS_RESPONSIBILITIES: MEAL PREP;HOUSEKEEPING;LAUNDRY;SHOPPING;MEDICATION MANAGEMENT;DRIVING
ADLS_ACUITY_SCORE: 37

## 2024-06-22 NOTE — PLAN OF CARE
Goal Outcome Evaluation:      Plan of Care Reviewed With: patient, spouse    Overall Patient Progress: no changeOverall Patient Progress: no change    Outcome Evaluation: A/Ox4, VSS on RA.   Midline incision and lap sites CDI with abdominal binder in place.  Pain managed with scheduled ketorolac and tylenol, dilaudid PCA in place for as needed, no doses given by patient.    Sanchez in place with good OP.   No BM this shift.    Ambulated in halls x2 today once with PT and once with nurse.  LR running at 100ml/hr.  Continue to monitor  and follow plan of care.

## 2024-06-22 NOTE — PROGRESS NOTES
Colorectal Surgery Progress Note    POD # 2  Interval History:  No acute events overnight.  Did well overnight.  This morning had a large bowel movement.  States that he is hungry and feeling okay.  His pain control is adequate.    Physical Exam:   Temp:  [98  F (36.7  C)-98.3  F (36.8  C)] 98  F (36.7  C)  Pulse:  [63-66] 66  Resp:  [18] 18  BP: (128-142)/(77-84) 134/84  SpO2:  [96 %-97 %] 96 %  General: Alert, oriented, appears comfortable, NAD.  Respiratory: breathing non labored  Abdomen: Abdomen is soft, non-tender, mild distended. Incision is clean, dry and intact without signs of infection    Data:   All laboratory and imaging data in the past 24 hours reviewed  I/O last 3 completed shifts:  In: 0   Out: 1350 [Urine:1350]  Recent Labs   Lab Test 06/22/24  0545 06/21/24  0609 06/20/24  1352 06/20/24  1200 05/29/24  1402   WBC 11.5* 15.7*  --   --  7.8   HGB 11.9* 13.6 14.1   < > 12.9*    246  --   --  310   INR 1.25* 1.31* 1.23*  --   --     < > = values in this interval not displayed.      Recent Labs   Lab Test 06/22/24  0545 06/21/24  1005 06/21/24  0609 06/20/24  1352     --  135 136   POTASSIUM 4.0  --  4.4 4.1   CHLORIDE 109*  --  106 104   CO2 22  --  16* 16*   BUN 15.5  --  19.9 18.4   CR 0.86  --  1.00 0.96   ANIONGAP 8  --  13 16*   LYN 7.9*  --  7.9* 7.4*   GLC 97 116* 142* 138*        Recent Labs   Lab Test 06/22/24  0545   PROTTOTAL 5.5*   ALBUMIN 3.1*   BILITOTAL 0.3   ALKPHOS 47   AST 29   ALT 10       Assessment and Plan:     Juan is a 78-year-old male postoperative day 2 status post open reductive surgery and HIPEC for metastatic low-grade appendiceal mucinous neoplasm.  Did well overnight with return of bowel function this morning.    Plan    Continue Dilaudid PCA  Continue the multimodal pain control with scheduled Toradol  Will advance to full liquid diet today  Will decrease LR to 50  discontinue Sanchez  Ambulation ad henry., I-S  Lovenox for DVT prophylaxis    Gwen Hendrix,  MD  Fellow, Colon and Rectal Surgery  [facility]  Pager: [phone]

## 2024-06-22 NOTE — PROGRESS NOTES
"   06/22/24 0901   Appointment Info   Signing Clinician's Name / Credentials (OT) Katey Roque OTR/L   Rehab Comments (OT) abdominal precautions   Living Environment   People in Home spouse   Current Living Arrangements house   Home Accessibility stairs to enter home;stairs within home   Number of Stairs, Main Entrance 2   Stair Railings, Main Entrance railings on both sides of stairs   Number of Stairs, Within Home, Primary six  (6 to upstairs; 6 to downstairs)   Stair Railings, Within Home, Primary railings on both sides of stairs   Transportation Anticipated family or friend will provide   Living Environment Comments Pt lives with spouse in house, all needs met on upper level. Has both walk in shower and tub/shower. Likely will use tub/shower. No chair or grab bars.   Self-Care   Usual Activity Tolerance excellent   Current Activity Tolerance moderate   Regular Exercise Yes   Activity/Exercise Type walking   Exercise Amount/Frequency 3-5 times/wk  (yardwork and 1-2 mi)   Equipment Currently Used at Home none   Fall history within last six months no   Activity/Exercise/Self-Care Comment IND in ADLs prior to admission, very active completing yardwork and walking   Instrumental Activities of Daily Living (IADL)   Previous Responsibilities meal prep;housekeeping;laundry;shopping;medication management;driving   IADL Comments IND in IADLs prior to admission   General Information   Onset of Illness/Injury or Date of Surgery 06/20/24   Referring Physician Justin Dela Cruz MD   Patient/Family Therapy Goal Statement (OT) Return home   Additional Occupational Profile Info/Pertinent History of Current Problem Per EMR, \"78 year old male POD #2 s/p open reductive surgery and hyperthermic intraperitoneal chemotherapy for 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\"   Existing Precautions/Restrictions abdominal;fall   Left Upper Extremity (Weight-bearing Status) partial " weight-bearing (PWB)  (<10 lbs lift/push/pull)   Right Upper Extremity (Weight-bearing Status) partial weight-bearing (PWB)  (<10 lbs lift/push/pull)   Left Lower Extremity (Weight-bearing Status) full weight-bearing (FWB)   Right Lower Extremity (Weight-bearing Status) full weight-bearing (FWB)   General Observations and Info Activity: ambulate with assist   Cognitive Status Examination   Orientation Status orientation to person, place and time   Cognitive Status Comments no concerns   Visual Perception   Visual Impairment/Limitations WFL   Sensory   Sensory Quick Adds sensation intact   Pain Assessment   Patient Currently in Pain Yes, see Vital Sign flowsheet   Posture   Posture not impaired   Range of Motion Comprehensive   General Range of Motion no range of motion deficits identified   Strength Comprehensive (MMT)   Comment, General Manual Muscle Testing (MMT) Assessment limited d/t precautions, appears WFL   Coordination   Upper Extremity Coordination No deficits were identified   Bed Mobility   Bed Mobility supine-sit;sit-supine   Supine-Sit [place] (Bed Mobility) minimum assist (75% patient effort)   Sit-Supine [place] (Bed Mobility) minimum assist (75% patient effort)   Comment (Bed Mobility) cues for log roll   Transfers   Transfers sit-stand transfer   Sit-Stand Transfer   Sit-Stand [place] (Transfers) supervision;verbal cues   Balance   Balance Assessment standing dynamic balance   Standing Balance: Dynamic contact guard;supervision   Activities of Daily Living   BADL Assessment/Intervention bathing;lower body dressing;grooming;toileting   Bathing Assessment/Intervention   [place] Level (Bathing) minimum assist (75% patient effort)   Comment, (Bathing) per clinical judgement   Lower Body Dressing Assessment/Training   Comment, (Lower Body Dressing) donning socks   [place] Level (Lower Body Dressing) maximum assist (25% patient effort)   Grooming Assessment/Training   [place]  Level (Grooming) supervision;set up   Comment, (Grooming) g/h tasks standing at sink in BR   Toileting   Comment, (Toileting) per clinical judgement   [place] Level (Toileting) contact guard assist   Clinical Impression   Criteria for Skilled Therapeutic Interventions Met (OT) Yes, treatment indicated   OT Diagnosis Dec IND in I/ADLs   OT Problem List-Impairments impacting ADL problems related to;activity tolerance impaired;pain;post-surgical precautions;range of motion (ROM)   Assessment of Occupational Performance 3-5 Performance Deficits   Identified Performance Deficits dressing, bathing, toileting, functional transfers and mobility   Planned Therapy Interventions (OT) ADL retraining;IADL retraining;bed mobility training;strengthening;transfer training;home program guidelines;progressive activity/exercise   Clinical Decision Making Complexity (OT) problem focused assessment/low complexity   Risk & Benefits of therapy have been explained evaluation/treatment results reviewed;care plan/treatment goals reviewed;risks/benefits reviewed;current/potential barriers reviewed;participants voiced agreement with care plan;participants included;patient   Clinical Impression Comments Pt appears below functional baseline. Would benefit from continued OT services to promote safety and IND in I/ADLs   OT Total Evaluation Time   OT Eval, Low Complexity Minutes (79515) 10   OT Goals   Therapy Frequency (OT) 6 times/week   OT Predicted Duration/Target Date for Goal Attainment 07/06/24   OT Goals Hygiene/Grooming;Lower Body Dressing;Lower Body Bathing;Transfers;Toilet Transfer/Toileting;Home Management;Aerobic Activity;OT Goal 1   OT: Hygiene/Grooming modified independent   OT: Lower Body Dressing Modified independent   OT: Lower Body Bathing Modified independent   OT: Transfer Modified independent  (tub transfer)   OT: Toilet Transfer/Toileting Modified independent   OT: Home Management Modified independent;with light demand  household tasks   OT: Perform aerobic activity with stable cardiovascular response continuous activity;30 minutes;ambulation   OT: Goal 1 Pt will adhere to abdominal precautions 100% of the time during ADLs and functional transfers/mobility   OT Discharge Planning   OT Plan review precautions, LB dressing, standing ADLs, tub transfer   OT Discharge Recommendation (DC Rec) home with assist   OT Rationale for DC Rec Pt appears below functional baseline. Limited by post op precautions and pain this date. Anticipate with LOS, once medically ready, pt will be safe for return home with spouse assistance prn. Will continue to monitor and update recs as appropriate   OT Brief overview of current status CGA w/ intermittent use of IV pole   Total Session Time   Timed Code Treatment Minutes 34   Total Session Time (sum of timed and untimed services) 44

## 2024-06-22 NOTE — PLAN OF CARE
Goal Outcome Evaluation:      Plan of Care Reviewed With: patient    Overall Patient Progress: improvingOverall Patient Progress: improving    Outcome Evaluation: Alert and oriented x4, VSS on RA.   Sanchez removed this morning, patient voiding without difficulty.  Four loose bowel movements this shift.  Patient tolerating full liquid diet without nausea.   Abdominal pain well managed with schedule tylenol and ketorolac.  No Dilaudid PCA used this shift.  SBA in room, wife at bedside.   Ambulated in halls this shift.   Phosphorous replaced this shift, recheck scheduled for tomorrow morning.   Continue plan of care.

## 2024-06-22 NOTE — PLAN OF CARE
Goal Outcome Evaluation:      Plan of Care Reviewed With: patient    Overall Patient Progress: no change    Outcome Evaluation: Alert and orietned x 4. Comnplained of abdominal pain, managed with Tylenol and IV Ketorolac. PCA Dilaudid not used by pt this shift. . maintained on NPO, LR infusion at 100 ml/hr. Sanchez catheter in place with james urine. Ambulated in the hallway with staff once this shift. Vital signs stable on room air.

## 2024-06-23 ENCOUNTER — APPOINTMENT (OUTPATIENT)
Dept: PHYSICAL THERAPY | Facility: CLINIC | Age: 79
DRG: 330 | End: 2024-06-23
Attending: COLON & RECTAL SURGERY
Payer: MEDICARE

## 2024-06-23 LAB
ALBUMIN SERPL BCG-MCNC: 3 G/DL (ref 3.5–5.2)
ALP SERPL-CCNC: 45 U/L (ref 40–150)
ALT SERPL W P-5'-P-CCNC: 9 U/L (ref 0–70)
ANION GAP SERPL CALCULATED.3IONS-SCNC: 6 MMOL/L (ref 7–15)
AST SERPL W P-5'-P-CCNC: 27 U/L (ref 0–45)
BILIRUB SERPL-MCNC: 0.3 MG/DL
BUN SERPL-MCNC: 12.4 MG/DL (ref 8–23)
CALCIUM SERPL-MCNC: 8.1 MG/DL (ref 8.8–10.2)
CHLORIDE SERPL-SCNC: 107 MMOL/L (ref 98–107)
CREAT SERPL-MCNC: 0.81 MG/DL (ref 0.67–1.17)
DEPRECATED HCO3 PLAS-SCNC: 26 MMOL/L (ref 22–29)
EGFRCR SERPLBLD CKD-EPI 2021: 90 ML/MIN/1.73M2
ERYTHROCYTE [DISTWIDTH] IN BLOOD BY AUTOMATED COUNT: 14.4 % (ref 10–15)
GLUCOSE SERPL-MCNC: 95 MG/DL (ref 70–99)
HCT VFR BLD AUTO: 34.2 % (ref 40–53)
HGB BLD-MCNC: 11.4 G/DL (ref 13.3–17.7)
INR PPP: 1.22 (ref 0.85–1.15)
MAGNESIUM SERPL-MCNC: 2.1 MG/DL (ref 1.7–2.3)
MCH RBC QN AUTO: 30.8 PG (ref 26.5–33)
MCHC RBC AUTO-ENTMCNC: 33.3 G/DL (ref 31.5–36.5)
MCV RBC AUTO: 92 FL (ref 78–100)
PHOSPHATE SERPL-MCNC: 2.5 MG/DL (ref 2.5–4.5)
PLATELET # BLD AUTO: 199 10E3/UL (ref 150–450)
POTASSIUM SERPL-SCNC: 4 MMOL/L (ref 3.4–5.3)
PROT SERPL-MCNC: 5.5 G/DL (ref 6.4–8.3)
RBC # BLD AUTO: 3.7 10E6/UL (ref 4.4–5.9)
SODIUM SERPL-SCNC: 139 MMOL/L (ref 135–145)
WBC # BLD AUTO: 6.7 10E3/UL (ref 4–11)

## 2024-06-23 PROCEDURE — 84100 ASSAY OF PHOSPHORUS: CPT | Performed by: COLON & RECTAL SURGERY

## 2024-06-23 PROCEDURE — 250N000011 HC RX IP 250 OP 636: Mod: JZ

## 2024-06-23 PROCEDURE — 97530 THERAPEUTIC ACTIVITIES: CPT | Mod: GP

## 2024-06-23 PROCEDURE — 36415 COLL VENOUS BLD VENIPUNCTURE: CPT | Performed by: COLON & RECTAL SURGERY

## 2024-06-23 PROCEDURE — 82374 ASSAY BLOOD CARBON DIOXIDE: CPT | Performed by: COLON & RECTAL SURGERY

## 2024-06-23 PROCEDURE — 250N000011 HC RX IP 250 OP 636: Mod: JZ | Performed by: COLON & RECTAL SURGERY

## 2024-06-23 PROCEDURE — 82247 BILIRUBIN TOTAL: CPT | Performed by: COLON & RECTAL SURGERY

## 2024-06-23 PROCEDURE — 250N000013 HC RX MED GY IP 250 OP 250 PS 637: Performed by: COLON & RECTAL SURGERY

## 2024-06-23 PROCEDURE — 120N000002 HC R&B MED SURG/OB UMMC

## 2024-06-23 PROCEDURE — 258N000003 HC RX IP 258 OP 636: Mod: JZ | Performed by: STUDENT IN AN ORGANIZED HEALTH CARE EDUCATION/TRAINING PROGRAM

## 2024-06-23 PROCEDURE — 85610 PROTHROMBIN TIME: CPT | Performed by: COLON & RECTAL SURGERY

## 2024-06-23 PROCEDURE — 83735 ASSAY OF MAGNESIUM: CPT | Performed by: COLON & RECTAL SURGERY

## 2024-06-23 PROCEDURE — 85018 HEMOGLOBIN: CPT | Performed by: COLON & RECTAL SURGERY

## 2024-06-23 RX ORDER — HYDROMORPHONE HCL IN WATER/PF 6 MG/30 ML
.2-.4 PATIENT CONTROLLED ANALGESIA SYRINGE INTRAVENOUS
Status: DISCONTINUED | OUTPATIENT
Start: 2024-06-23 | End: 2024-06-24

## 2024-06-23 RX ORDER — OXYCODONE HYDROCHLORIDE 5 MG/1
5-10 TABLET ORAL
Status: DISCONTINUED | OUTPATIENT
Start: 2024-06-23 | End: 2024-06-25 | Stop reason: HOSPADM

## 2024-06-23 RX ADMIN — LOSARTAN POTASSIUM 25 MG: 25 TABLET, FILM COATED ORAL at 21:26

## 2024-06-23 RX ADMIN — Medication 20000 UNITS: at 21:26

## 2024-06-23 RX ADMIN — KETOROLAC TROMETHAMINE 15 MG: 30 INJECTION, SOLUTION INTRAMUSCULAR at 06:36

## 2024-06-23 RX ADMIN — KETOROLAC TROMETHAMINE 15 MG: 30 INJECTION, SOLUTION INTRAMUSCULAR at 12:44

## 2024-06-23 RX ADMIN — OXYCODONE HYDROCHLORIDE 5 MG: 5 TABLET ORAL at 21:26

## 2024-06-23 RX ADMIN — FAMOTIDINE 20 MG: 10 INJECTION, SOLUTION INTRAVENOUS at 06:36

## 2024-06-23 RX ADMIN — Medication 20000 UNITS: at 08:25

## 2024-06-23 RX ADMIN — KETOROLAC TROMETHAMINE 15 MG: 30 INJECTION, SOLUTION INTRAMUSCULAR at 01:02

## 2024-06-23 RX ADMIN — OXYCODONE HYDROCHLORIDE 5 MG: 5 TABLET ORAL at 17:28

## 2024-06-23 RX ADMIN — FAMOTIDINE 20 MG: 10 INJECTION, SOLUTION INTRAVENOUS at 18:44

## 2024-06-23 RX ADMIN — SODIUM CHLORIDE, POTASSIUM CHLORIDE, SODIUM LACTATE AND CALCIUM CHLORIDE: 600; 310; 30; 20 INJECTION, SOLUTION INTRAVENOUS at 06:39

## 2024-06-23 RX ADMIN — ENOXAPARIN SODIUM 40 MG: 40 INJECTION SUBCUTANEOUS at 17:28

## 2024-06-23 RX ADMIN — KETOROLAC TROMETHAMINE 15 MG: 30 INJECTION, SOLUTION INTRAMUSCULAR at 18:44

## 2024-06-23 RX ADMIN — OXYCODONE HYDROCHLORIDE 5 MG: 5 TABLET ORAL at 10:01

## 2024-06-23 ASSESSMENT — ACTIVITIES OF DAILY LIVING (ADL)
ADLS_ACUITY_SCORE: 37
ADLS_ACUITY_SCORE: 36
ADLS_ACUITY_SCORE: 36
ADLS_ACUITY_SCORE: 37
ADLS_ACUITY_SCORE: 36
ADLS_ACUITY_SCORE: 37
ADLS_ACUITY_SCORE: 36
ADLS_ACUITY_SCORE: 37
ADLS_ACUITY_SCORE: 36

## 2024-06-23 NOTE — PLAN OF CARE
Goal Outcome Evaluation:      Plan of Care Reviewed With: patient    Overall Patient Progress: improvingOverall Patient Progress: improving    Outcome Evaluation: A/Ox4 , VSS.   Abdominal pain well managed with scheduled toradol and PRN oxycodone.   Midline incision and lap sites CDI, abdominal binder on for support.   Patient showered this morning.   Up ambulating halls with SBA.   Tolerating low fiber diet, no nausea reported.   Voiding without difficulty, one BM this morning.   Continue to monitor and follow plan of care.

## 2024-06-23 NOTE — PROGRESS NOTES
Colorectal Surgery Progress Note    POD # 2  Interval History:  No acute events overnight.  Feeling a bit bloated this morning.  Took in plenty of full liquid diet yesterday.  Ambulating but not as much as we would like because of pain.  He is not hitting his PCA.  Still having plentiful bowel movements and passing gas.    Physical Exam:   Temp:  [97.6  F (36.4  C)-98.2  F (36.8  C)] 97.9  F (36.6  C)  Pulse:  [63-68] 67  Resp:  [16-18] 18  BP: (130-142)/(77-83) 140/79  SpO2:  [93 %-97 %] 93 %  General: Alert, oriented, appears comfortable, NAD.  Respiratory: breathing non labored  Abdomen: Abdomen is soft, non-tender, mild distended. Incision is clean, dry and intact without signs of infection    Data:   All laboratory and imaging data in the past 24 hours reviewed  I/O last 3 completed shifts:  In: 736 [P.O.:736]  Out: 800 [Urine:800]  Recent Labs   Lab Test 06/23/24  0635 06/22/24  0545 06/21/24  0609   WBC 6.7 11.5* 15.7*   HGB 11.4* 11.9* 13.6    208 246   INR 1.22* 1.25* 1.31*      Recent Labs   Lab Test 06/23/24  0635 06/22/24  0545 06/21/24  1005 06/21/24  0609    139  --  135   POTASSIUM 4.0 4.0  --  4.4   CHLORIDE 107 109*  --  106   CO2 26 22  --  16*   BUN 12.4 15.5  --  19.9   CR 0.81 0.86  --  1.00   ANIONGAP 6* 8  --  13   LYN 8.1* 7.9*  --  7.9*   GLC 95 97 116* 142*        Recent Labs   Lab Test 06/22/24  0545   PROTTOTAL 5.5*   ALBUMIN 3.1*   BILITOTAL 0.3   ALKPHOS 47   AST 29   ALT 10       Assessment and Plan:     Juan is a 78-year-old male postoperative day 3 status post open reductive surgery and HIPEC for metastatic low-grade appendiceal mucinous neoplasm.  Doing okay with full liquid diet and having bowel function with diarrhea predominantly.    Plan    Continue Dilaudid PCA-encouraged the patient to hit the PCA so he can ambulate and do his I-S  Continue the multimodal pain control with scheduled Toradol  Given the bloating we will continue full liquid diet for today  Will  decrease LR to 50  Ambulation ad henry., I-S  Lovenox for DVT prophylaxis    Gwen Hendrix MD  Fellow, Colon and Rectal Surgery  [facility]  Pager: [phone]

## 2024-06-23 NOTE — PLAN OF CARE
Goal Outcome Evaluation:      Plan of Care Reviewed With: patient    Overall Patient Progress: improvingOverall Patient Progress: improving    Outcome Evaluation: [id number]. A&Ox4, Vss on Ra. Abd pain managed w scheduled meds, no PCA doses given. Abd incision site CDI. LR @50ml/hr. 2 loose watery Bms this shift and voiding adequately. Worked w OT.

## 2024-06-24 ENCOUNTER — APPOINTMENT (OUTPATIENT)
Dept: PHYSICAL THERAPY | Facility: CLINIC | Age: 79
DRG: 330 | End: 2024-06-24
Attending: COLON & RECTAL SURGERY
Payer: MEDICARE

## 2024-06-24 ENCOUNTER — APPOINTMENT (OUTPATIENT)
Dept: OCCUPATIONAL THERAPY | Facility: CLINIC | Age: 79
DRG: 330 | End: 2024-06-24
Attending: COLON & RECTAL SURGERY
Payer: MEDICARE

## 2024-06-24 LAB
ALBUMIN SERPL BCG-MCNC: 3 G/DL (ref 3.5–5.2)
ALP SERPL-CCNC: 46 U/L (ref 40–150)
ALT SERPL W P-5'-P-CCNC: 7 U/L (ref 0–70)
ANION GAP SERPL CALCULATED.3IONS-SCNC: 7 MMOL/L (ref 7–15)
AST SERPL W P-5'-P-CCNC: 24 U/L (ref 0–45)
BILIRUB SERPL-MCNC: 0.4 MG/DL
BUN SERPL-MCNC: 13.2 MG/DL (ref 8–23)
CALCIUM SERPL-MCNC: 8.2 MG/DL (ref 8.8–10.2)
CHLORIDE SERPL-SCNC: 106 MMOL/L (ref 98–107)
CREAT SERPL-MCNC: 0.8 MG/DL (ref 0.67–1.17)
DEPRECATED HCO3 PLAS-SCNC: 25 MMOL/L (ref 22–29)
EGFRCR SERPLBLD CKD-EPI 2021: 90 ML/MIN/1.73M2
ERYTHROCYTE [DISTWIDTH] IN BLOOD BY AUTOMATED COUNT: 14.1 % (ref 10–15)
GLUCOSE SERPL-MCNC: 104 MG/DL (ref 70–99)
HCT VFR BLD AUTO: 34.9 % (ref 40–53)
HGB BLD-MCNC: 11.7 G/DL (ref 13.3–17.7)
INR PPP: 1.01 (ref 0.85–1.15)
MAGNESIUM SERPL-MCNC: 2.1 MG/DL (ref 1.7–2.3)
MCH RBC QN AUTO: 30.6 PG (ref 26.5–33)
MCHC RBC AUTO-ENTMCNC: 33.5 G/DL (ref 31.5–36.5)
MCV RBC AUTO: 91 FL (ref 78–100)
PHOSPHATE SERPL-MCNC: 3 MG/DL (ref 2.5–4.5)
PLATELET # BLD AUTO: 202 10E3/UL (ref 150–450)
POTASSIUM SERPL-SCNC: 4 MMOL/L (ref 3.4–5.3)
PROT SERPL-MCNC: 5.6 G/DL (ref 6.4–8.3)
RBC # BLD AUTO: 3.82 10E6/UL (ref 4.4–5.9)
SODIUM SERPL-SCNC: 138 MMOL/L (ref 135–145)
WBC # BLD AUTO: 4.3 10E3/UL (ref 4–11)

## 2024-06-24 PROCEDURE — 250N000011 HC RX IP 250 OP 636: Mod: JZ | Performed by: COLON & RECTAL SURGERY

## 2024-06-24 PROCEDURE — 250N000011 HC RX IP 250 OP 636: Mod: JZ

## 2024-06-24 PROCEDURE — 120N000002 HC R&B MED SURG/OB UMMC

## 2024-06-24 PROCEDURE — 97530 THERAPEUTIC ACTIVITIES: CPT | Mod: GP

## 2024-06-24 PROCEDURE — 83735 ASSAY OF MAGNESIUM: CPT | Performed by: COLON & RECTAL SURGERY

## 2024-06-24 PROCEDURE — 250N000013 HC RX MED GY IP 250 OP 250 PS 637: Performed by: COLON & RECTAL SURGERY

## 2024-06-24 PROCEDURE — 97535 SELF CARE MNGMENT TRAINING: CPT | Mod: GO

## 2024-06-24 PROCEDURE — 250N000013 HC RX MED GY IP 250 OP 250 PS 637

## 2024-06-24 PROCEDURE — 85610 PROTHROMBIN TIME: CPT | Performed by: COLON & RECTAL SURGERY

## 2024-06-24 PROCEDURE — 80053 COMPREHEN METABOLIC PANEL: CPT | Performed by: COLON & RECTAL SURGERY

## 2024-06-24 PROCEDURE — 85027 COMPLETE CBC AUTOMATED: CPT | Performed by: COLON & RECTAL SURGERY

## 2024-06-24 PROCEDURE — 84100 ASSAY OF PHOSPHORUS: CPT | Performed by: COLON & RECTAL SURGERY

## 2024-06-24 PROCEDURE — 36415 COLL VENOUS BLD VENIPUNCTURE: CPT | Performed by: COLON & RECTAL SURGERY

## 2024-06-24 RX ORDER — ENOXAPARIN SODIUM 100 MG/ML
40 INJECTION SUBCUTANEOUS EVERY 24 HOURS
Qty: 12 ML | Refills: 0 | Status: SHIPPED | OUTPATIENT
Start: 2024-06-21 | End: 2024-06-25

## 2024-06-24 RX ORDER — SENNOSIDES 8.6 MG
8.6 TABLET ORAL DAILY PRN
Status: DISCONTINUED | OUTPATIENT
Start: 2024-06-24 | End: 2024-06-25

## 2024-06-24 RX ORDER — CALCIUM CARBONATE 500 MG/1
1 TABLET, CHEWABLE ORAL 4 TIMES DAILY PRN
Qty: 28 TABLET | Refills: 0 | Status: SHIPPED | OUTPATIENT
Start: 2024-06-24 | End: 2024-07-08

## 2024-06-24 RX ORDER — CHOLECALCIFEROL (VITAMIN D3) 125 MCG
20000 CAPSULE ORAL 2 TIMES DAILY
Qty: 120 CAPSULE | Refills: 0 | Status: SHIPPED | OUTPATIENT
Start: 2024-06-22 | End: 2024-07-22

## 2024-06-24 RX ORDER — ACETAMINOPHEN 500 MG
1000 TABLET ORAL 4 TIMES DAILY
Qty: 50 TABLET | Refills: 0 | Status: SHIPPED | OUTPATIENT
Start: 2024-06-24 | End: 2024-07-08

## 2024-06-24 RX ORDER — ONDANSETRON 4 MG/1
4 TABLET, ORALLY DISINTEGRATING ORAL EVERY 6 HOURS PRN
Qty: 20 TABLET | Refills: 0 | Status: SHIPPED | OUTPATIENT
Start: 2024-06-24 | End: 2024-07-08

## 2024-06-24 RX ORDER — METHOCARBAMOL 500 MG/1
500 TABLET, FILM COATED ORAL 3 TIMES DAILY PRN
Qty: 21 TABLET | Refills: 0 | Status: SHIPPED | OUTPATIENT
Start: 2024-06-24 | End: 2024-07-08

## 2024-06-24 RX ORDER — IBUPROFEN 800 MG/1
800 TABLET, FILM COATED ORAL 3 TIMES DAILY
Status: DISCONTINUED | OUTPATIENT
Start: 2024-06-24 | End: 2024-06-25 | Stop reason: HOSPADM

## 2024-06-24 RX ORDER — IBUPROFEN 800 MG/1
800 TABLET, FILM COATED ORAL EVERY 8 HOURS PRN
Qty: 21 TABLET | Refills: 0 | Status: SHIPPED | OUTPATIENT
Start: 2024-06-24 | End: 2024-07-08

## 2024-06-24 RX ADMIN — Medication 20000 UNITS: at 09:12

## 2024-06-24 RX ADMIN — OXYCODONE HYDROCHLORIDE 5 MG: 5 TABLET ORAL at 09:14

## 2024-06-24 RX ADMIN — Medication 20000 UNITS: at 21:25

## 2024-06-24 RX ADMIN — LOSARTAN POTASSIUM 25 MG: 25 TABLET, FILM COATED ORAL at 21:26

## 2024-06-24 RX ADMIN — KETOROLAC TROMETHAMINE 15 MG: 30 INJECTION, SOLUTION INTRAMUSCULAR at 06:16

## 2024-06-24 RX ADMIN — IBUPROFEN 800 MG: 800 TABLET, FILM COATED ORAL at 19:58

## 2024-06-24 RX ADMIN — FAMOTIDINE 20 MG: 10 INJECTION, SOLUTION INTRAVENOUS at 06:16

## 2024-06-24 RX ADMIN — KETOROLAC TROMETHAMINE 15 MG: 30 INJECTION, SOLUTION INTRAMUSCULAR at 00:56

## 2024-06-24 RX ADMIN — SENNOSIDES 8.6 MG: 8.6 TABLET, FILM COATED ORAL at 21:28

## 2024-06-24 RX ADMIN — IBUPROFEN 800 MG: 800 TABLET, FILM COATED ORAL at 13:26

## 2024-06-24 RX ADMIN — ENOXAPARIN SODIUM 40 MG: 40 INJECTION SUBCUTANEOUS at 17:03

## 2024-06-24 ASSESSMENT — ACTIVITIES OF DAILY LIVING (ADL)
ADLS_ACUITY_SCORE: 36
ADLS_ACUITY_SCORE: 36
ADLS_ACUITY_SCORE: 33
ADLS_ACUITY_SCORE: 33
ADLS_ACUITY_SCORE: 36
ADLS_ACUITY_SCORE: 33
ADLS_ACUITY_SCORE: 33
ADLS_ACUITY_SCORE: 36
ADLS_ACUITY_SCORE: 33
ADLS_ACUITY_SCORE: 33
ADLS_ACUITY_SCORE: 36
ADLS_ACUITY_SCORE: 36
ADLS_ACUITY_SCORE: 33
ADLS_ACUITY_SCORE: 36
ADLS_ACUITY_SCORE: 36
ADLS_ACUITY_SCORE: 33

## 2024-06-24 NOTE — DISCHARGE SUMMARY
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  Dyslipidemia  Coronary artery disease  S/p PCI with JO ANN on aspirin prior to admission  Hypertension  Borderline to mild aortic root enlargement  Moderate to severe ascending aortic dilation (4.8 cm)         Procedures:   Open cytoreductive surgery.  Hyperthermic intraperitoneal chemotherapy.  Infracolic omentectomy.  Partial cecectomy.    General anesthesia with bilateral HEAVEN blocks          Consultations:   OCCUPATIONAL THERAPY ADULT IP CONSULT  PHYSICAL THERAPY ADULT IP CONSULT  CARE MANAGEMENT / SOCIAL WORK IP CONSULT  NURSING TO CONSULT FOR VASCULAR ACCESS CARE IP CONSULT  NURSING TO CONSULT FOR VASCULAR ACCESS CARE IP CONSULT         Imaging Studies:     Results for orders placed or performed during the hospital encounter of 06/20/24   POC US Guidance Needle Placement    Narrative    Quadratus lumborum block    Impression    Quadratus lumborum block            Medications Prior to Admission:     Medications Prior to Admission   Medication Sig Dispense Refill Last Dose    ezetimibe  "(ZETIA) 10 MG tablet Take 10 mg by mouth at bedtime   6/18/2024    losartan (COZAAR) 25 MG tablet Take 1 tablet (25 mg) by mouth at bedtime   6/18/2024    nitroGLYcerin (NITROSTAT) 0.4 MG sublingual tablet Place 0.4 mg under the tongue every 5 minutes as needed for chest pain       rosuvastatin (CRESTOR) 5 MG tablet Take 5 mg by mouth every other day Takes 5 mg po every other day   6/18/2024    [DISCONTINUED] acetaminophen (TYLENOL) 325 MG tablet Take 2 tablets (650 mg) by mouth every 4 hours as needed for mild pain 50 tablet 0     [DISCONTINUED] Cholecalciferol (VITAMIN D) 50 MCG (2000 UT) CAPS Take 5,000 Units by mouth at bedtime   6/11/2024    [DISCONTINUED] docusate sodium (COLACE) 50 MG capsule Take 50 mg by mouth 3 times daily       [DISCONTINUED] ibuprofen (ADVIL/MOTRIN) 600 MG tablet Take 1 tablet (600 mg) by mouth every 6 hours as needed for other (mild and/or inflammatory pain) 30 tablet 0     [DISCONTINUED] methocarbamol (ROBAXIN) 500 MG tablet Take 1 tablet (500 mg) by mouth 4 times daily as needed for muscle spasms 30 tablet 1     [DISCONTINUED] metroNIDAZOLE (FLAGYL) 500 MG tablet Take 1 tablet (500 mg) by mouth every 6 hours At 8:00 am, 2:00 pm, 8:00 pm the day prior to your surgery with neomycin and zofran. 3 tablet 0 6/19/2024 at 0800    [DISCONTINUED] neomycin (MYCIFRADIN) 500 MG tablet Take 2 tablets (1,000 mg) by mouth every 6 hours At 8:00 am, 2:00 pm, 8:00 pm the day prior to your surgery with flagyl and zofran. 6 tablet 0 6/19/2024    [DISCONTINUED] ondansetron (ZOFRAN) 4 MG tablet Take 1 tablet (4 mg) by mouth every 6 hours At 8:00 am, 2:00 pm, 8:00 pm the day prior to your surgery with neomycin and flagyl. 3 tablet 0 6/19/2024    [DISCONTINUED] polyethylene glycol (MIRALAX) 17 g packet Take 238 g by mouth See Admin Instructions Starting at 4 pm night prior to surgery. Refer to \"Getting Ready for Surgery\" instructions. 14 packet 0 6/19/2024    [DISCONTINUED] Vitamin D3 (CHOLECALCIFEROL) 125 " MCG (5000 UT) tablet Take 1 tablet by mouth daily   Past Week              Discharge Medications:     Current Discharge Medication List        START taking these medications    Details   calcium carbonate (TUMS) 500 MG chewable tablet Take 1 tablet (500 mg) by mouth 4 times daily as needed for heartburn  Qty: 28 tablet, Refills: 0    Associated Diagnoses: Pseudomyxoma peritonei (H)      enoxaparin ANTICOAGULANT (LOVENOX) 40 MG/0.4ML syringe Inject 0.4 mLs (40 mg) Subcutaneous every 24 hours for 26 days  Qty: 10.4 mL, Refills: 0    Associated Diagnoses: Pseudomyxoma peritonei (H)      ondansetron (ZOFRAN ODT) 4 MG ODT tab Take 1 tablet (4 mg) by mouth every 6 hours as needed for nausea or vomiting  Qty: 20 tablet, Refills: 0    Associated Diagnoses: Pseudomyxoma peritonei (H)      oxyCODONE (ROXICODONE) 5 MG tablet Take 1 tablet (5 mg) by mouth every 6 hours as needed for moderate to severe pain  Qty: 15 tablet, Refills: 0    Associated Diagnoses: Low grade mucinous neoplasm of appendix      vitamin A 3 MG (86844 UNITS) capsule Take 2 capsules (20,000 Units) by mouth 2 times daily for 30 days  Qty: 120 capsule, Refills: 0    Associated Diagnoses: Pseudomyxoma peritonei (H)           CONTINUE these medications which have CHANGED    Details   acetaminophen (TYLENOL) 500 MG tablet Take 2 tablets (1,000 mg) by mouth 4 times daily  Qty: 50 tablet, Refills: 0    Associated Diagnoses: Pseudomyxoma peritonei (H)      ibuprofen (ADVIL/MOTRIN) 800 MG tablet Take 1 tablet (800 mg) by mouth every 8 hours as needed for moderate pain  Qty: 21 tablet, Refills: 0    Associated Diagnoses: Pseudomyxoma peritonei (H)      methocarbamol (ROBAXIN) 500 MG tablet Take 1 tablet (500 mg) by mouth 3 times daily as needed for muscle spasms  Qty: 21 tablet, Refills: 0    Associated Diagnoses: Pseudomyxoma peritonei (H)      !! polyethylene glycol (MIRALAX) 17 GM/Dose powder Take 17 g (1 Capful) by mouth 2 times daily for 30 days  Qty: 510 g,  Refills: 0    Comments: Hold for loose stools  Associated Diagnoses: Low grade mucinous neoplasm of appendix      !! polyethylene glycol (MIRALAX) 17 GM/Dose powder Take 17 g by mouth 2 times daily  Qty: 510 g, Refills: 0    Comments: Hold for loose stools  Associated Diagnoses: Low grade mucinous neoplasm of appendix       !! - Potential duplicate medications found. Please discuss with provider.        CONTINUE these medications which have NOT CHANGED    Details   ezetimibe (ZETIA) 10 MG tablet Take 10 mg by mouth at bedtime      losartan (COZAAR) 25 MG tablet Take 1 tablet (25 mg) by mouth at bedtime      nitroGLYcerin (NITROSTAT) 0.4 MG sublingual tablet Place 0.4 mg under the tongue every 5 minutes as needed for chest pain      rosuvastatin (CRESTOR) 5 MG tablet Take 5 mg by mouth every other day Takes 5 mg po every other day           STOP taking these medications       Cholecalciferol (VITAMIN D) 50 MCG (2000 UT) CAPS Comments:   Reason for Stopping:         docusate sodium (COLACE) 50 MG capsule Comments:   Reason for Stopping:         metroNIDAZOLE (FLAGYL) 500 MG tablet Comments:   Reason for Stopping:         neomycin (MYCIFRADIN) 500 MG tablet Comments:   Reason for Stopping:         ondansetron (ZOFRAN) 4 MG tablet Comments:   Reason for Stopping:         Vitamin D3 (CHOLECALCIFEROL) 125 MCG (5000 UT) tablet Comments:   Reason for Stopping:                        Brief History of Illness:   Patient who has been followed by Dr. Dela Cruz and is s/p diagnostic laparotomy, with peritoneal cytology and appendectomy on 5/14/24. He was admitted at that time for observation due to hypotension and pain control. His pathology revealed low-grade appendiceal mucinous neoplasm (LAMN); proximal appendiceal margin is free of neoplastic involvement, and transmural defect/perforation with serosal deposits of acellular mucin (also present at site of mesenteric margin).      Patient's history is otherwise significant for  "HLD, HTN, CAD, s/p PCI to mid LCx, (mild-moderate CAD in distal LM, LAD, and RCA), dilated ascending aorta, GERD, kidney stone, and blood antibody. For his cardiac issues he is followed by cardiology, last seen on 5/3/24 for cardiac preop evaluation for surgery.    Patient was scheduled for elective Open cytoreductive surgery and Hyperthermic intraperitoneal chemotherapy on 06/20/24           Hospital Course:   Patient underwent the above procedures on 06/20/24 with no intra-operative complications. Please refer to OP note for full details. Post-operatively pt was gently fluid resuscitated. Sanchez was removed on POD #2 and pt was able to void.  Pt had eventual return of bowel function and was able to tolerate small amounts of a low fiber diet.     Pt was seen by PT/OT and deemed appropriate for discharge home. Pt was taught how to self-inject post-operative prophylactic lovenox for which he is to do for a total of 30 days. Plan is to hold his home Aspirin till he is on DVT ppx. Post-operative pain was initially controlled with a Dilaudid PCA and scheduled Toradol. This was discontinued on POD #3, and patient was started on scheduled Tylenol, ibuprofen, Robaxin and prn oxycodone.     Patient is to follow up in the Colon and Rectal Surgery Clinic in 2-3 week with Kylie Wade NP or Eleonora Huerta PA-C and then with Dr. Dela Cruz in 3-4 weeks after.  Pt should have labs prior to first post-operative CRS clinic appt.          Day of Discharge Physical Exam:   Blood pressure (!) 145/88, pulse 70, temperature 97.9  F (36.6  C), temperature source Oral, resp. rate 16, height 1.74 m (5' 8.5\"), weight 91.4 kg (201 lb 8 oz), SpO2 94%.    Gen: AAOx3, NAD  Pulm: Non-labored breathing  Abd: Soft, appropriately tender, no guarding/rebound   Incision C/D/I with staples   Ext:  Warm and well-perfused         Final Pathology Result:   Pending at time of discharge           Discharge Instructions and Follow-Up:     Discharge " Procedure Orders   CBC with platelets   Standing Status: Future Standing Exp. Date: 06/25/25     Magnesium   Standing Status: Future Standing Exp. Date: 06/25/25     Phosphorus   Standing Status: Future Standing Exp. Date: 06/25/25     Comprehensive metabolic panel   Standing Status: Future Standing Exp. Date: 06/25/25     INR   Standing Status: Future Standing Exp. Date: 06/25/25     Reason for your hospital stay   Order Comments: PREOPERATIVE DIAGNOSIS:  1.  Peritoneal adenomucinosis.  2.  Metastatic low-grade appendiceal mucinous neoplasm.  3.  Dyslipidemia.  4.  Coronary artery disease.     PROCEDURE:  1. Open cytoreductive surgery.  2. Hyperthermic intraperitoneal chemotherapy.  3. Infracolic omentectomy.  4. Partial cecectomy.     ANESTHESIA: General endotracheal anesthesia plus bilateral TAP blocks.     Activity   Order Comments: Your activity upon discharge:   ACTIVITY  -No lifting, pushing, pulling greater than 10 lbs and no strenuous exercise for 6 weeks   -No driving while on narcotic analgesics (i.e. Percocet, oxycodone, Vicodin)  -No driving until you are able to fully twist to both sides or slam on brakes quickly and without any pain  -We encourage walking at least 4-5 times per day     Order Specific Question Answer Comments   Is discharge order? Yes      Discharge Instructions   Order Comments: WOUND CARE  -Inspect your wounds daily for signs of infection (increased redness, drainage, pain)  -Keep your wound clean and dry  -You may shower, but do not soak in tub or pool    NOTIFY  Please contact Riya العراقي RN or Summer Lynne RN at [phone] for problems after discharge such as:  -Temperature > 101F, chills, rigors, dizziness  -Redness around or purulent drainage from wound  -Inability to tolerate diet, nausea or vomiting  -You stop passing gas, develop significant bloating, abdominal pain  -Have blood in stools/vomit  -Have severe diarrhea/constipation  -Any other questions or concerns.  - At  nights (after 4:30pm), on weekends, or if urgent, call [phone] and ask the  to speak with the on-call Colorectal Surgery resident or fellow      Medication Instructions  Some of your medications may have changed. Please take only prescribed and resumed medications. Do not take Aspirin till cleared by colorectal surgery in clinic.    FOLLOW-UP  1.  You will need to follow-up with Kylie Wade NP or Eleonora Huerta PA-C in the Colon and Rectal Surgery clinic in 2-3 week(s) and then with CRS Staff: Dr. Justin Dela Cruz in 4-5 weeks after.  You will need the following blood work prior to your appointment with Kylie Wade NP:  CBC, CMP, Mg, Phos, INR.  Please contact our Nurses (phone # [phone]) if you have not heard from our clinic in 3 business days afer discharge to schedule a follow-up appointment.    2.  Do NOT take aspirin while on post-operative lovenox prophylaxis injections.  When you have completed your lovenox injection course, you can resume your prior to admission aspirin the day after your last dose of lovenox.    3.  We recommend 30 days of high dose Vitamin A after surgery to help with wound healing in the setting of decreased immune function from chemotherapy.     Diet   Order Comments: Follow this diet upon discharge: Orders Placed This Encounter      Snacks/Supplements Adult: Ensure Clear; With Meals      Low Fiber Diet     Order Specific Question Answer Comments   Is discharge order? Yes      Diet   Order Comments: Follow this diet upon discharge:   DIET  - Low Residue Diet for at least 4-6 weeks unless cleared by Colorectal surgery.  No raw vegetables, fruit skins, fibrous foods that require a lot of chewing, nuts, seeds, corn, popcorn.   -We recommend eating slowly, chewing thoroughly, eating small frequent meals throughout the day  -Stay well hydrated.     Order Specific Question Answer Comments   Is discharge order? Yes             Home Health Care:      Not needed           Discharge Disposition:     Discharged to home      Condition at discharge: Stable    Wyatt Garcia, MS4    Sruthi HANEY  PGY1 General Surgery  [facility]    Patient was seen and discussed with Dr. Cruz, fellow    The above plan of care was performed and communicated to me by Dr. Dela Cruz

## 2024-06-24 NOTE — PHARMACY-ADMISSION MEDICATION HISTORY
Pharmacist Admission Medication History    Admission medication history is complete. The information provided in this note is only as accurate as the sources available at the time of the update.    Information Source(s): Patient and Family member via in-person and provided home medication list    Pertinent Information: none    Changes made to PTA medication list:  Added: Vitamin D3  Deleted: None  Changed: Rosuvastatin 5 mg po at bedtime --> 5 mg po every other day    Allergies reviewed with patient and updates made in EHR: no    Medication History Completed By: Sebastien Palomino [facility] 6/24/2024 3:58 PM    Prior to Admission medications    Medication Sig Last Dose Taking? Auth Provider Long Term End Date   acetaminophen (TYLENOL) 500 MG tablet Take 2 tablets (1,000 mg) by mouth 4 times daily  Yes Justin Dela Cruz MD No    calcium carbonate (TUMS) 500 MG chewable tablet Take 1 tablet (500 mg) by mouth 4 times daily as needed for heartburn  Yes Justin Dela Cruz MD     enoxaparin ANTICOAGULANT (LOVENOX) 40 MG/0.4ML syringe Inject 0.4 mLs (40 mg) Subcutaneous every 24 hours for 30 days  Yes Justin Dela Cruz MD  7/21/24   ibuprofen (ADVIL/MOTRIN) 800 MG tablet Take 1 tablet (800 mg) by mouth every 8 hours as needed for moderate pain  Yes Justin Dela Cruz MD No    methocarbamol (ROBAXIN) 500 MG tablet Take 1 tablet (500 mg) by mouth 3 times daily as needed for muscle spasms  Yes Justin Dela Cruz MD No    ondansetron (ZOFRAN ODT) 4 MG ODT tab Take 1 tablet (4 mg) by mouth every 6 hours as needed for nausea or vomiting  Yes Justin Dela Cruz MD     vitamin A 3 MG (56200 UNITS) capsule Take 2 capsules (20,000 Units) by mouth 2 times daily for 30 days  Yes Justin Dela Cruz MD  7/22/24   Vitamin D3 (CHOLECALCIFEROL) 125 MCG (5000 UT) tablet Take 1 tablet by mouth daily  Yes Unknown, Entered By History No    aspirin 81 MG EC tablet Take 81 mg by mouth every 48 hours  Bedtime 6/11/2024  Reported, Patient     ezetimibe (ZETIA) 10 MG tablet Take 10 mg by mouth at bedtime 6/18/2024  Reported, Patient Yes    losartan (COZAAR) 25 MG tablet Take 1 tablet (25 mg) by mouth at bedtime 6/18/2024  Evelyn Romero PA-C Yes    nitroGLYcerin (NITROSTAT) 0.4 MG sublingual tablet Place 0.4 mg under the tongue every 5 minutes as needed for chest pain   Reported, Patient Yes    rosuvastatin (CRESTOR) 5 MG tablet Take 5 mg by mouth every other day Takes 5 mg po every other day 6/18/2024  Reported, Patient Yes

## 2024-06-24 NOTE — PROGRESS NOTES
Colon and Rectal Surgery  Daily Progress Note    Subjective  Patient reports feeling well today. He has had no nausea or vomiting and tolerating the full liquid diet yesterday, but feels a little bloated. Hasn't taken low residue diet yet as did not have the correct menu. He was able to ambulate many times around the hallways with his wife yesterday with no issues. Pain has been well controlled since his PCA was stopped yesterday as well.     Objective  Intake/Output last 24 hrs:    Intake/Output Summary (Last 24 hours) at 6/24/2024 0541  Last data filed at 6/23/2024 2000  Gross per 24 hour   Intake 440 ml   Output --   Net 440 ml     Temp:  [97.9  F (36.6  C)-98.3  F (36.8  C)] 98.2  F (36.8  C)  Pulse:  [60-67] 61  Resp:  [16-20] 16  BP: (135-141)/(78-79) 141/78  SpO2:  [93 %-98 %] 98 %    Physical Exam:  General: awake, alert, comfortable in bed, in no acute distress  Head: normocephalic, atraumatic  Respiratory: non-labored breathing  Abdomen: soft, non-tender, minimally distended              Incisions: clean, dry and intact  Skin: No rashes or lesions  Musculoskeletal: moves all four extremities equally  Psychological: alert and oriented, answers questions appropriately    Pertinent Labs  Lab Results: personally reviewed.  Lab Results   Component Value Date     06/23/2024     06/22/2024     06/21/2024    CO2 26 06/23/2024    CO2 22 06/22/2024    CO2 16 06/21/2024    BUN 12.4 06/23/2024    BUN 15.5 06/22/2024    BUN 19.9 06/21/2024     Lab Results   Component Value Date    WBC 6.7 06/23/2024    WBC 11.5 06/22/2024    WBC 15.7 06/21/2024    HGB 11.4 06/23/2024    HGB 11.9 06/22/2024    HGB 13.6 06/21/2024    HCT 34.2 06/23/2024    HCT 36.6 06/22/2024    HCT 40.5 06/21/2024    MCV 92 06/23/2024    MCV 94 06/22/2024    MCV 92 06/21/2024     06/23/2024     06/22/2024     06/21/2024       Assessment/Plan: This is a 78 year old male POD #1 s/p open reductive surgery and  hyperthermic intraperitoneal chemotherapy for 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.     Patient appears to be doing well today. He has been tolerating a full liquid diet since yesterday with no N/V although he continues to be minimally bloated. He did not have a bowel movement since yesterday but continues to pass gas. Pain is well controlled on current regimen. Has been ambulating without difficulty.     - Discontinued Dilaudid PCA 06/23. Continue multimodal pain control with scheduled Toradol and Tylenol. PRNs with oxycodone and Dilaudid  - Encouraged to start his low fiber diet today as tolerated  - Discontinue IVF today  - Ambulation 4-5x a day and use IS  - Lovenox DVT ppx: will go home on lovenox for a total of 30 days post op    Dispo: Plan to discharge tomorrow if he is able to tolerate a low fiber diet today and continues to have good bowel function.    Patient seen with Dr Cruz, fellow who discussed with staff    Wyatt Garcia, MS4    Sruthi HANEY  PGY1 General Surgery  [facility]

## 2024-06-24 NOTE — PLAN OF CARE
Goal Outcome Evaluation:      Plan of Care Reviewed With: patient    Overall Patient Progress: improvingOverall Patient Progress: improving    Outcome Evaluation: [id number]. A&Ox4, Vss on Ra. Abd pain well managed w scheduled meds & oxy x1. Abd incision and lap sites CDI w abd binder on. Took a long walk with his wife. Ate all of dinner, tolerating low fiber diet. No Bm this shift but still passing gas.

## 2024-06-24 NOTE — PLAN OF CARE
Physical Therapy Discharge Summary    Reason for therapy discharge:    All goals and outcomes met, no further needs identified.    Progress towards therapy goal(s). See goals on Care Plan in [facility] electronic health record for goal details.  Goals met    Therapy recommendation(s):    Continue home exercise program.

## 2024-06-24 NOTE — PLAN OF CARE
hift: 0700-1930     D: See flowsheets for full assessment & vitals    PRNs: Oxy x1  Labs: K 4.0, Mag 2.1,Phos 3.0  Running: L PIV SL     A/R: AOx4. VSS on RA. Reports mild abd pain, requested PO oxy x1, started scheduled ibuprofen, declines scheduled tylenol (says it doesn't help w/ his abd pain). IV toradol & IVF discontinued. Fair appetite on low fiber diet, menu provided, denies nausea. Bowel sounds present, passing gas but no BM this shift. Voiding spontaneously. Abd lap sites CDI, abd binder in place. Worked w/ PT in afternoon. Walking halls & up to chair in room frequently. Family supportive at bedside.      P: Plan for discharge home tomorrow pending continued pain management & tolerating low fiber    Goal Outcome Evaluation:  Plan of Care Reviewed With: patient  Overall Patient Progress: improving  Outcome Evaluation: VSS on RA. Mild abd pain well managed w/ scheduled ibuprofen & PO oxy x1, IV pain meds discontinued. Tolerating low fiber diet, still no BM. IVF discontinued. Possible discharge again tomorrow

## 2024-06-25 ENCOUNTER — APPOINTMENT (OUTPATIENT)
Dept: OCCUPATIONAL THERAPY | Facility: CLINIC | Age: 79
DRG: 330 | End: 2024-06-25
Attending: COLON & RECTAL SURGERY
Payer: MEDICARE

## 2024-06-25 VITALS
OXYGEN SATURATION: 94 % | BODY MASS INDEX: 29.84 KG/M2 | RESPIRATION RATE: 16 BRPM | SYSTOLIC BLOOD PRESSURE: 145 MMHG | WEIGHT: 201.5 LBS | HEIGHT: 69 IN | TEMPERATURE: 97.9 F | HEART RATE: 70 BPM | DIASTOLIC BLOOD PRESSURE: 88 MMHG

## 2024-06-25 LAB
ALBUMIN SERPL BCG-MCNC: 3.3 G/DL (ref 3.5–5.2)
ALP SERPL-CCNC: 52 U/L (ref 40–150)
ALT SERPL W P-5'-P-CCNC: 24 U/L (ref 0–70)
ANION GAP SERPL CALCULATED.3IONS-SCNC: 7 MMOL/L (ref 7–15)
AST SERPL W P-5'-P-CCNC: 35 U/L (ref 0–45)
BILIRUB SERPL-MCNC: 0.4 MG/DL
BUN SERPL-MCNC: 14.7 MG/DL (ref 8–23)
CALCIUM SERPL-MCNC: 8.4 MG/DL (ref 8.8–10.2)
CHLORIDE SERPL-SCNC: 107 MMOL/L (ref 98–107)
CREAT SERPL-MCNC: 0.75 MG/DL (ref 0.67–1.17)
DEPRECATED HCO3 PLAS-SCNC: 25 MMOL/L (ref 22–29)
EGFRCR SERPLBLD CKD-EPI 2021: >90 ML/MIN/1.73M2
ERYTHROCYTE [DISTWIDTH] IN BLOOD BY AUTOMATED COUNT: 14.1 % (ref 10–15)
GLUCOSE SERPL-MCNC: 110 MG/DL (ref 70–99)
HCT VFR BLD AUTO: 35.2 % (ref 40–53)
HGB BLD-MCNC: 11.9 G/DL (ref 13.3–17.7)
INR PPP: 1.07 (ref 0.85–1.15)
MAGNESIUM SERPL-MCNC: 2.1 MG/DL (ref 1.7–2.3)
MCH RBC QN AUTO: 30.8 PG (ref 26.5–33)
MCHC RBC AUTO-ENTMCNC: 33.8 G/DL (ref 31.5–36.5)
MCV RBC AUTO: 91 FL (ref 78–100)
PATH REPORT.COMMENTS IMP SPEC: NORMAL
PATH REPORT.FINAL DX SPEC: NORMAL
PATH REPORT.GROSS SPEC: NORMAL
PATH REPORT.MICROSCOPIC SPEC OTHER STN: NORMAL
PATH REPORT.RELEVANT HX SPEC: NORMAL
PHOSPHATE SERPL-MCNC: 2.9 MG/DL (ref 2.5–4.5)
PHOTO IMAGE: NORMAL
PLATELET # BLD AUTO: 227 10E3/UL (ref 150–450)
POTASSIUM SERPL-SCNC: 4.4 MMOL/L (ref 3.4–5.3)
PROT SERPL-MCNC: 5.9 G/DL (ref 6.4–8.3)
RBC # BLD AUTO: 3.86 10E6/UL (ref 4.4–5.9)
SODIUM SERPL-SCNC: 139 MMOL/L (ref 135–145)
WBC # BLD AUTO: 4.7 10E3/UL (ref 4–11)

## 2024-06-25 PROCEDURE — 97530 THERAPEUTIC ACTIVITIES: CPT | Mod: GO | Performed by: OCCUPATIONAL THERAPIST

## 2024-06-25 PROCEDURE — 250N000013 HC RX MED GY IP 250 OP 250 PS 637: Performed by: COLON & RECTAL SURGERY

## 2024-06-25 PROCEDURE — 85027 COMPLETE CBC AUTOMATED: CPT | Performed by: COLON & RECTAL SURGERY

## 2024-06-25 PROCEDURE — 83735 ASSAY OF MAGNESIUM: CPT | Performed by: COLON & RECTAL SURGERY

## 2024-06-25 PROCEDURE — 85610 PROTHROMBIN TIME: CPT | Performed by: COLON & RECTAL SURGERY

## 2024-06-25 PROCEDURE — 36415 COLL VENOUS BLD VENIPUNCTURE: CPT | Performed by: COLON & RECTAL SURGERY

## 2024-06-25 PROCEDURE — 80053 COMPREHEN METABOLIC PANEL: CPT | Performed by: COLON & RECTAL SURGERY

## 2024-06-25 PROCEDURE — 97535 SELF CARE MNGMENT TRAINING: CPT | Mod: GO | Performed by: OCCUPATIONAL THERAPIST

## 2024-06-25 PROCEDURE — 84100 ASSAY OF PHOSPHORUS: CPT | Performed by: COLON & RECTAL SURGERY

## 2024-06-25 PROCEDURE — 250N000013 HC RX MED GY IP 250 OP 250 PS 637: Performed by: SURGERY

## 2024-06-25 RX ORDER — OXYCODONE HYDROCHLORIDE 5 MG/1
5 TABLET ORAL EVERY 6 HOURS PRN
Qty: 16 TABLET | Refills: 0 | Status: SHIPPED | OUTPATIENT
Start: 2024-06-25 | End: 2024-06-25

## 2024-06-25 RX ORDER — OXYCODONE HYDROCHLORIDE 5 MG/1
5 TABLET ORAL EVERY 6 HOURS PRN
Qty: 15 TABLET | Refills: 0 | Status: SHIPPED | OUTPATIENT
Start: 2024-06-25 | End: 2024-07-08

## 2024-06-25 RX ORDER — POLYETHYLENE GLYCOL 3350 17 G/17G
17 POWDER, FOR SOLUTION ORAL 2 TIMES DAILY
Status: DISCONTINUED | OUTPATIENT
Start: 2024-06-25 | End: 2024-06-25 | Stop reason: HOSPADM

## 2024-06-25 RX ORDER — POLYETHYLENE GLYCOL 3350 17 G/17G
1 POWDER, FOR SOLUTION ORAL 2 TIMES DAILY
Qty: 510 G | Refills: 0 | Status: SHIPPED | OUTPATIENT
Start: 2024-06-25 | End: 2024-07-08

## 2024-06-25 RX ORDER — OXYCODONE HYDROCHLORIDE 5 MG/1
5 TABLET ORAL EVERY 6 HOURS PRN
Qty: 15 TABLET | Refills: 0 | Status: SHIPPED | OUTPATIENT
Start: 2024-06-25 | End: 2024-06-25

## 2024-06-25 RX ORDER — POLYETHYLENE GLYCOL 3350 17 G/17G
17 POWDER, FOR SOLUTION ORAL 2 TIMES DAILY
Qty: 510 G | Refills: 0 | Status: SHIPPED | OUTPATIENT
Start: 2024-06-25 | End: 2024-07-08

## 2024-06-25 RX ORDER — ENOXAPARIN SODIUM 100 MG/ML
40 INJECTION SUBCUTANEOUS EVERY 24 HOURS
Qty: 10.4 ML | Refills: 0 | Status: SHIPPED | OUTPATIENT
Start: 2024-06-25 | End: 2024-07-21

## 2024-06-25 RX ORDER — OXYCODONE HYDROCHLORIDE 5 MG/1
5-10 TABLET ORAL
Qty: 20 TABLET | Refills: 0 | Status: SHIPPED | OUTPATIENT
Start: 2024-06-25 | End: 2024-06-25

## 2024-06-25 RX ADMIN — OXYCODONE HYDROCHLORIDE 5 MG: 5 TABLET ORAL at 05:45

## 2024-06-25 RX ADMIN — OXYCODONE HYDROCHLORIDE 5 MG: 5 TABLET ORAL at 11:14

## 2024-06-25 RX ADMIN — Medication 20000 UNITS: at 08:59

## 2024-06-25 RX ADMIN — POLYETHYLENE GLYCOL 3350 17 G: 17 POWDER, FOR SOLUTION ORAL at 08:59

## 2024-06-25 RX ADMIN — IBUPROFEN 800 MG: 800 TABLET, FILM COATED ORAL at 08:59

## 2024-06-25 ASSESSMENT — ACTIVITIES OF DAILY LIVING (ADL)
ADLS_ACUITY_SCORE: 33

## 2024-06-25 NOTE — PLAN OF CARE
Goal Outcome Evaluation:      Plan of Care Reviewed With: patient    Overall Patient Progress: improving    Neuros:  A&Ox4, denies dizziness, n/v, tingling or numbness.   Vitals: Vitally stable, Afebrile  Pain: pain on surgical site when getting out of bed, otherwise pain is controlled with scheduled ibufprofen  Resp: Lung sounds clear, on room air with no SOB noted.    GI: Bowel sounds active.  Last BM 06/23. Prn senna given.  : Voiding spontaneously  Cardiovascular: WNL  Lines/Daines: R PIV/SL  Skin: 4 lasp sites and mid abdominal incision dermabonded, CDI and ORA  Diet: Regular diet.    Activity: Independent   Recommendations/Plan: Pain management, waiting for bowel movement. Possible discharge to Home.

## 2024-06-25 NOTE — PROGRESS NOTES
Pt requested for prn Oxycodone this morning for pain level 5/10. Still waiting for BM. Pt is independent on all ADL's. Wound is CDI.

## 2024-06-25 NOTE — PLAN OF CARE
Discharge to: Home  Transportation: Family   Time: 1300  Prescriptions: Sent to discharge pharmacy for pt to    Belongings: Packed & sent w/ pt   Access: PIV removed   Care plan and education discontinued: Done   Paperwork: AVS reviewed w/ patient & wife & sent w/ pt     RN assumed cares at 0700. Abd pain up to 4/10 improved w/ PRN oxy x1 & scheduled ibuprofen, declined tylenol. Still no BM, bowel sounds active, endorses passing gas, miralax given in AM. Educated on lovenox injections, instructed not to take Asprin while on lovenox. Informed pt to be sure to get labs drawn prior to follow appointment July 8th, per provider. Wheeled down to discharge pharmacy at 1300 to pick  up meds then discharge home w/ wife.

## 2024-06-27 ENCOUNTER — PATIENT OUTREACH (OUTPATIENT)
Dept: CARE COORDINATION | Facility: CLINIC | Age: 79
End: 2024-06-27
Payer: COMMERCIAL

## 2024-06-27 ENCOUNTER — PATIENT OUTREACH (OUTPATIENT)
Dept: SURGERY | Facility: CLINIC | Age: 79
End: 2024-06-27
Payer: COMMERCIAL

## 2024-06-27 DIAGNOSIS — C18.1 MALIGNANT NEOPLASM OF APPENDIX (H): Primary | ICD-10-CM

## 2024-06-27 DIAGNOSIS — R79.1 ABNORMAL COAGULATION PROFILE: ICD-10-CM

## 2024-06-27 NOTE — TELEPHONE ENCOUNTER
Post Op Note     Called to check on patient postoperatively after hospital discharge.       Patient is s/p cytorectuction hipec with Dr. Justin Dela Cruz .   Admitted 6/20 and discharged on 6/25.    Pain is well controlled with tylenol, ibuprofen, oxy, robaxin. Controlled easily per patient.   Patient is eating and drinking normally. Patient is on a regular diet.  Encouraged patient to drink 8-10 glasses of water a day.   Patient is passing flats, is having regular soft stools.   Patient does not require supplies.  Patient is voiding normally and urine is light in color.  Patient is not set up with home care.  Patient Denies nausea and vomiting.  Patient Denies any fevers or chills.  Patient's incision is stapled - activity restriction. Lifting 10 pounds for 6 weeks.   Patient Denies needing any forms completed.   Follow up is set up with Eleonora Huerta PA-C on 7/8 and with Dr. Justin Dela Cruz on 8/5.   Encouraged the patient to contact the clinic in the meantime with any fevers, chills, nausea, vomiting, increased colostomy output, no colostomy output, dizziness, lightheadedness, uncontrolled pain, changes to the incisions, or with any questions or concerns.  -Special Concerns:  staples, extended lovenox ppx, labs before post op visit (scheduled).  Labs ordered and appointment made prior to first post op visit with Eleonora    Patient's questions were answered to their stated satisfaction and they are in agreement with this plan.    DARNELL Wheeler [phone]  Colon & Rectal Surgery Clinic  [facility]

## 2024-06-27 NOTE — PROGRESS NOTES
[facility]:   [facility] Contact  [facility]/Voicemail     Clinical Data: Post-Discharge Outreach     Outreach attempted x 2.  Left message on patient's voicemail, providing [facility]'s central phone number of [phone] ([phone]) for questions/concerns and/or to schedule an appt with an [facility] provider, if they do not have a PCP.      Plan:  [facility] will do no further outreaches at this time.       Kenia Garcia  Community Health Worker  [facility], [facility]  [phone]      *Connected Care Resource Team does NOT follow patient ongoing. Referrals are identified based on internal discharge reports and the outreach is to ensure patient has an understanding of their discharge instructions.

## 2024-07-05 ENCOUNTER — NURSE TRIAGE (OUTPATIENT)
Dept: SURGERY | Facility: CLINIC | Age: 79
End: 2024-07-05
Payer: COMMERCIAL

## 2024-07-05 NOTE — TELEPHONE ENCOUNTER
Nurse Triage SBAR    Is this a 2nd Level Triage? YES, LICENSED PRACTITIONER REVIEW IS REQUIRED    Situation:  Dr Dela Cruz procedure 6/20/24, hospital discharge 6/25/24.  Open cytoreductive surgery.  Hyperthermic intraperitoneal chemotherapy.  Infracolic omentectomy.  Partial cecectomy.    diarrhea, fever, weight loss, sore throat & tongue, chills, no appetite.   Last CBC 6/25/24  -Fever: 99.6 and he usually runs( 97)2 days  - diarrhea: started yesterday morning 7/4/24 and was very intense, burning on exit, previous had been loose like toothpaste, yesterday he was stooling   liquid- clear every 45 minutes. Did slow down overnight ( 2 stools overnight)  - oral take low- crackers, greek yogurt, Pepto Bismol. No blood reported  - Nausea, yes- poor appetite without vomiting .  -  no GI/ intestinal illness in recent contacts  - Incision looks clean and dry without pain or seepage  - Sore throat and tongue- he gargles with salt water but didn't yesterday due to diarrhea - tongue feels sore> tongue was bright red and slimy and top of tongue two areas of white 3/8 of an inch and 1 inch in length toward posterior tongue     Initially after surgery he had been feeling better, but now worse   Seeing local in [place] at 11:30 today but wishes to review sx with Dr Dela Cruz team       Assessment: See today to eval C-diff and possible thrush    Protocol Recommended Disposition:   ED/ office today    Recommendation: Please review and call( [phone]) Has 7/8/24 Traci Huerta follow up appt in place    Routed to provider    Does the patient meet one of the following criteria for ADS visit consideration? No    Reason for Disposition   SEVERE diarrhea (e.g., 7 or more times / day more than normal) and age > 60 years    Additional Information   Negative: Shock suspected (e.g., cold/pale/clammy skin, too weak to stand, low BP, rapid pulse)   Negative: Difficult to awaken or acting confused (e.g., disoriented, slurred speech)   Negative:  Sounds like a life-threatening emergency to the triager   Negative: SEVERE abdominal pain (e.g., excruciating) and present > 1 hour   Negative: SEVERE abdominal pain and age > 60 years   Negative: Bloody, black, or tarry bowel movements  (Exception: Chronic-unchanged black-grey bowel movements and is taking iron pills or Pepto-Bismol.)    Protocols used: Diarrhea-A-OH

## 2024-07-05 NOTE — TELEPHONE ENCOUNTER
Caller reporting the following red-flag symptom(s): After procedure diarrhea, fever, weight loss, sore throat & tongue, chills, no appetite.     Per the system red-flag symptom policy, patient was instructed to:  speak with a Registered Nurse    Action:  Patient warm transferred to a Registered Nurse

## 2024-07-05 NOTE — TELEPHONE ENCOUNTER
"7/5: S/p HPEC, omentectomy and partial cecectomy on 6/20/2024 with . Calls in with diarrhea, temp of 99.6, and possible thrush (tongue bright red and slimly with white film).     Yesterday he had loose stools going every 45 min. States it \" is the most terrible smell.\" Denies increased abdominal pain. He is having abdominal cramping. He went into PCP today. C diff testing pending. He started taking pepto bismal and was wondering if this would interact with lovenox. Discussed with .     PCP examined tongue today. No concern for thrush per pt.     7/8: He was unable to complete the C Diff test as his stools went back to formed. He is now having a soft formed stool every other day which is baseline for him. No more concerns at this time.          "

## 2024-07-08 ENCOUNTER — OFFICE VISIT (OUTPATIENT)
Dept: SURGERY | Facility: CLINIC | Age: 79
End: 2024-07-08
Payer: COMMERCIAL

## 2024-07-08 ENCOUNTER — LAB (OUTPATIENT)
Dept: LAB | Facility: CLINIC | Age: 79
End: 2024-07-08
Payer: COMMERCIAL

## 2024-07-08 VITALS
WEIGHT: 188.8 LBS | HEIGHT: 69 IN | BODY MASS INDEX: 27.96 KG/M2 | HEART RATE: 64 BPM | DIASTOLIC BLOOD PRESSURE: 76 MMHG | OXYGEN SATURATION: 95 % | SYSTOLIC BLOOD PRESSURE: 120 MMHG

## 2024-07-08 DIAGNOSIS — Z09 FOLLOW-UP EXAMINATION AFTER COLORECTAL SURGERY: Primary | ICD-10-CM

## 2024-07-08 DIAGNOSIS — D37.3 LOW GRADE MUCINOUS NEOPLASM OF APPENDIX: ICD-10-CM

## 2024-07-08 DIAGNOSIS — C18.1 MALIGNANT NEOPLASM OF APPENDIX (H): ICD-10-CM

## 2024-07-08 DIAGNOSIS — R79.1 ABNORMAL COAGULATION PROFILE: ICD-10-CM

## 2024-07-08 LAB
ALBUMIN SERPL BCG-MCNC: 4.1 G/DL (ref 3.5–5.2)
ALP SERPL-CCNC: 73 U/L (ref 40–150)
ALT SERPL W P-5'-P-CCNC: 14 U/L (ref 0–70)
ANION GAP SERPL CALCULATED.3IONS-SCNC: 10 MMOL/L (ref 7–15)
AST SERPL W P-5'-P-CCNC: 19 U/L (ref 0–45)
BASOPHILS # BLD AUTO: 0 10E3/UL (ref 0–0.2)
BASOPHILS NFR BLD AUTO: 0 %
BILIRUB SERPL-MCNC: 0.2 MG/DL
BUN SERPL-MCNC: 19.7 MG/DL (ref 8–23)
CALCIUM SERPL-MCNC: 9.6 MG/DL (ref 8.8–10.2)
CHLORIDE SERPL-SCNC: 103 MMOL/L (ref 98–107)
CREAT SERPL-MCNC: 0.86 MG/DL (ref 0.67–1.17)
DEPRECATED HCO3 PLAS-SCNC: 26 MMOL/L (ref 22–29)
EGFRCR SERPLBLD CKD-EPI 2021: 88 ML/MIN/1.73M2
EOSINOPHIL # BLD AUTO: 0.1 10E3/UL (ref 0–0.7)
EOSINOPHIL NFR BLD AUTO: 2 %
ERYTHROCYTE [DISTWIDTH] IN BLOOD BY AUTOMATED COUNT: 14.6 % (ref 10–15)
GLUCOSE SERPL-MCNC: 92 MG/DL (ref 70–99)
HCT VFR BLD AUTO: 40.2 % (ref 40–53)
HGB BLD-MCNC: 13.1 G/DL (ref 13.3–17.7)
IMM GRANULOCYTES # BLD: 0 10E3/UL
IMM GRANULOCYTES NFR BLD: 0 %
INR PPP: 1.04 (ref 0.85–1.15)
LYMPHOCYTES # BLD AUTO: 1.4 10E3/UL (ref 0.8–5.3)
LYMPHOCYTES NFR BLD AUTO: 29 %
MAGNESIUM SERPL-MCNC: 2.2 MG/DL (ref 1.7–2.3)
MCH RBC QN AUTO: 29.8 PG (ref 26.5–33)
MCHC RBC AUTO-ENTMCNC: 32.6 G/DL (ref 31.5–36.5)
MCV RBC AUTO: 92 FL (ref 78–100)
MONOCYTES # BLD AUTO: 0.7 10E3/UL (ref 0–1.3)
MONOCYTES NFR BLD AUTO: 14 %
NEUTROPHILS # BLD AUTO: 2.7 10E3/UL (ref 1.6–8.3)
NEUTROPHILS NFR BLD AUTO: 55 %
NRBC # BLD AUTO: 0 10E3/UL
NRBC BLD AUTO-RTO: 0 /100
PHOSPHATE SERPL-MCNC: 2.8 MG/DL (ref 2.5–4.5)
PLATELET # BLD AUTO: 244 10E3/UL (ref 150–450)
POTASSIUM SERPL-SCNC: 4.3 MMOL/L (ref 3.4–5.3)
PROT SERPL-MCNC: 7.2 G/DL (ref 6.4–8.3)
RBC # BLD AUTO: 4.39 10E6/UL (ref 4.4–5.9)
SODIUM SERPL-SCNC: 139 MMOL/L (ref 135–145)
WBC # BLD AUTO: 4.9 10E3/UL (ref 4–11)

## 2024-07-08 PROCEDURE — 80053 COMPREHEN METABOLIC PANEL: CPT | Performed by: PATHOLOGY

## 2024-07-08 PROCEDURE — 85025 COMPLETE CBC W/AUTO DIFF WBC: CPT | Performed by: PATHOLOGY

## 2024-07-08 PROCEDURE — 83735 ASSAY OF MAGNESIUM: CPT | Performed by: PATHOLOGY

## 2024-07-08 PROCEDURE — 84100 ASSAY OF PHOSPHORUS: CPT | Performed by: PATHOLOGY

## 2024-07-08 PROCEDURE — 99024 POSTOP FOLLOW-UP VISIT: CPT

## 2024-07-08 PROCEDURE — 36415 COLL VENOUS BLD VENIPUNCTURE: CPT | Performed by: PATHOLOGY

## 2024-07-08 PROCEDURE — 85610 PROTHROMBIN TIME: CPT | Performed by: PATHOLOGY

## 2024-07-08 ASSESSMENT — PAIN SCALES - GENERAL: PAINLEVEL: NO PAIN (0)

## 2024-07-08 NOTE — NURSING NOTE
"Chief Complaint   Patient presents with    Post-op Visit       Vitals:    07/08/24 1030   BP: 120/76   BP Location: Left arm   Patient Position: Sitting   Cuff Size: Adult Regular   Pulse: 64   SpO2: 95%   Weight: 188 lb 12.8 oz   Height: 5' 8.5\"       Body mass index is 28.29 kg/m .    Marianela Mcgill, EMT  "

## 2024-07-08 NOTE — LETTER
7/8/2024       RE: Juan Delarosa  [address]  [facility] [address]     Dear Colleague,    Thank you for referring your patient, Juan Delarosa, to the [facility] [place] at [facility]. Please see a copy of my visit note below.    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 Dela Cruz.    Final Diagnosis      A. UMBILICUS, RESECTION:  -Benign skin and adipose tissue with focal fat necrosis, negative for malignancy    B. FALCIFORM LIGAMENT, EXCISION:  -Benign fibroadipose tissue with minute aggregate of acellular mucin at the periphery (possible contaminant)    C. PERITONEAL NODULES, BIOPSIES:  -Mesothelial lined fibroadipose tissue with acellular mucin deposits  -Remote hemorrhage and fibrosis, mixed inflammation    D. OMENTUM, RESECTION:  -Omental fat with acellular mucin deposits    E.  CECUM, PARTIAL CECECTOMY:  -Cecal wall with serosal adhesions, focal acellular mucin deposits within periserosal soft tissue  -Colonic mucosa with no significant histologic abnormality, negative for dysplasia  -Fibrosis, focal fat necrosis and foreign body giant cell reaction compatible with site of prior surgical intervention     Interval history: Doing well. Minimal pain currently. No longer taking narcotic pain or any OTC meds. On a low fiber diet, no nausea. Bowel movements have been like toothpaste consistency but then he had some diarrhea on the 4th and 5th. He took some pepto which helped, and now he feels like he might be a little constipated.     Physical Examination:   /76 (BP Location: Left arm,  "Patient Position: Sitting, Cuff Size: Adult Regular)   Pulse 64   Ht 5' 8.5\"   Wt 188 lb 12.8 oz   SpO2 95%   BMI 28.29 kg/m    General: alert, oriented, in no acute distress, sitting comfortably  Respiratory: non-labored breathing on RA  Abdomen: Soft. Midline incision and 4 lap incisions well approximated, covered with Dermabond Prineo. No erythema. No drainage.    Results for orders placed or performed in visit on 07/08/24 (from the past 24 hour(s))   Magnesium   Result Value Ref Range    Magnesium 2.2 1.7 - 2.3 mg/dL   Phosphorus   Result Value Ref Range    Phosphorus 2.8 2.5 - 4.5 mg/dL   Comprehensive metabolic panel   Result Value Ref Range    Sodium 139 135 - 145 mmol/L    Potassium 4.3 3.4 - 5.3 mmol/L    Carbon Dioxide (CO2) 26 22 - 29 mmol/L    Anion Gap 10 7 - 15 mmol/L    Urea Nitrogen 19.7 8.0 - 23.0 mg/dL    Creatinine 0.86 0.67 - 1.17 mg/dL    GFR Estimate 88 >60 mL/min/1.73m2    Calcium 9.6 8.8 - 10.2 mg/dL    Chloride 103 98 - 107 mmol/L    Glucose 92 70 - 99 mg/dL    Alkaline Phosphatase 73 40 - 150 U/L    AST 19 0 - 45 U/L    ALT 14 0 - 70 U/L    Protein Total 7.2 6.4 - 8.3 g/dL    Albumin 4.1 3.5 - 5.2 g/dL    Bilirubin Total 0.2 <=1.2 mg/dL   INR   Result Value Ref Range    INR 1.04 0.85 - 1.15   CBC with Platelets & Differential    Narrative    The following orders were created for panel order CBC with Platelets & Differential.  Procedure                               Abnormality         Status                     ---------                               -----------         ------                     CBC with platelets and d...[[id number]]  Abnormal            Final result                 Please view results for these tests on the individual orders.   CBC with platelets and differential   Result Value Ref Range    WBC Count 4.9 4.0 - 11.0 10e3/uL    RBC Count 4.39 (L) 4.40 - 5.90 10e6/uL    Hemoglobin 13.1 (L) 13.3 - 17.7 g/dL    Hematocrit 40.2 40.0 - 53.0 %    MCV 92 78 - 100 fL    MCH " 29.8 26.5 - 33.0 pg    MCHC 32.6 31.5 - 36.5 g/dL    RDW 14.6 10.0 - 15.0 %    Platelet Count 244 150 - 450 10e3/uL    % Neutrophils 55 %    % Lymphocytes 29 %    % Monocytes 14 %    % Eosinophils 2 %    % Basophils 0 %    % Immature Granulocytes 0 %    NRBCs per 100 WBC 0 <1 /100    Absolute Neutrophils 2.7 1.6 - 8.3 10e3/uL    Absolute Lymphocytes 1.4 0.8 - 5.3 10e3/uL    Absolute Monocytes 0.7 0.0 - 1.3 10e3/uL    Absolute Eosinophils 0.1 0.0 - 0.7 10e3/uL    Absolute Basophils 0.0 0.0 - 0.2 10e3/uL    Absolute Immature Granulocytes 0.0 <=0.4 10e3/uL    Absolute NRBCs 0.0 10e3/uL     Assessment/Plan:  78 year old male with a 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 Dela Cruz. Doing well after surgery and recovering appropriately. Can slowly advance to a regular diet as tolerated. No lifting >10 lb for 6 weeks postop. Recommended a daily fiber supplement. Already scheduled to see Dr. Dela Cruz on 8/5/24. Contact us with any questions or concerns in the meantime. Patient's questions were answered to their stated satisfaction and they are in agreement with this plan.    Past Medical History:   Diagnosis Date     Ascending aorta dilation (H24)      CAD (coronary artery disease)      Concussion      Gastroesophageal reflux disease      HLD (hyperlipidemia)      HTN (hypertension)      Kidney stone      Malignant neoplasm of appendix (H)      PONV (postoperative nausea and vomiting)      Pseudomyxoma peritonei (H)      Vertigo      Past Surgical History:   Procedure Laterality Date     COLONOSCOPY      mulitple     IR MISCELLANEOUS PROCEDURE  01/18/2006     IR MISCELLANEOUS PROCEDURE  01/18/2006     KNEE SURGERY Bilateral      LAPAROSCOPIC APPENDECTOMY N/A 5/14/2024    Procedure: LAPAROSCOPY, DIAGNOSTIC, with peritineal cytology and appendectomy;  Surgeon:  Justin Dela Cruz MD;  Location: [facility] OR     LAPAROTOMY, TUMOR DEBULKING, CHEMOTHERAPY, COMBINED N/A 6/20/2024    Procedure: OPEN PARTIAL CYTOREDUCTIVE SURGERY, WITH HYPERTHERMIC INTRAPERITONEAL CHEMOTHERAPY;  Surgeon: Justin Dela Cruz MD;  Location: [facility] OR     LUMBAR DISCECTOMY       PERCUTANEOUS NEPHROSTOMY       SHOULDER SURGERY       Current Outpatient Medications   Medication Sig Dispense Refill     enoxaparin ANTICOAGULANT (LOVENOX) 40 MG/0.4ML syringe Inject 0.4 mLs (40 mg) Subcutaneous every 24 hours for 26 days 10.4 mL 0     ezetimibe (ZETIA) 10 MG tablet Take 10 mg by mouth at bedtime       losartan (COZAAR) 25 MG tablet Take 1 tablet (25 mg) by mouth at bedtime       rosuvastatin (CRESTOR) 5 MG tablet Take 5 mg by mouth every other day Takes 5 mg po every other day       vitamin A 3 MG (07478 UNITS) capsule Take 2 capsules (20,000 Units) by mouth 2 times daily for 30 days 120 capsule 0     nitroGLYcerin (NITROSTAT) 0.4 MG sublingual tablet Place 0.4 mg under the tongue every 5 minutes as needed for chest pain (Patient not taking: Reported on 7/8/2024)       ondansetron (ZOFRAN ODT) 4 MG ODT tab Take 1 tablet (4 mg) by mouth every 6 hours as needed for nausea or vomiting 20 tablet 0     oxyCODONE (ROXICODONE) 5 MG tablet Take 1 tablet (5 mg) by mouth every 6 hours as needed for moderate to severe pain 15 tablet 0     polyethylene glycol (MIRALAX) 17 GM/Dose powder Take 17 g (1 Capful) by mouth 2 times daily for 30 days 510 g 0     polyethylene glycol (MIRALAX) 17 GM/Dose powder Take 17 g by mouth 2 times daily 510 g 0     Allergies   Allergen Reactions     Blood Transfusion Related (Informational Only)      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  A delay in compatible RBCs may occur.     Gabapentin Dizziness     Family History   Problem Relation Age of Onset     Diabetes Type 2  Sister      Diabetes Type 2  Sister      Diabetes Type 2  Sister       Diabetes Type 2  Brother      Diabetes Type 2  Brother      Diabetes Type 2  Brother      Diabetes Type 2  Brother      Diabetes Type 2  Brother      Diabetes Type 2  Brother      Stomach Cancer Maternal Grandmother      Anesthesia Reaction No family hx of      Clotting Disorder No family hx of      Bleeding Disorder No family hx of      Social History     Tobacco Use     Smoking status: Never     Smokeless tobacco: Never   Substance Use Topics     Alcohol use: Never     Marital status: .      Eleonora Huerta PA-C  Colon and Rectal Surgery  [facility]      I spent a total of 20 minutes on the day of the visit.  Time spent on date of encounter doing chart review, history and exam, documentation, and further activities in this note. This is a postoperative visit.       Again, thank you for allowing me to participate in the care of your patient.      Sincerely,    Eleonora Huerta PA-C

## 2024-07-08 NOTE — PROGRESS NOTES
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 Dela Cruz.    Final Diagnosis      A. UMBILICUS, RESECTION:  -Benign skin and adipose tissue with focal fat necrosis, negative for malignancy    B. FALCIFORM LIGAMENT, EXCISION:  -Benign fibroadipose tissue with minute aggregate of acellular mucin at the periphery (possible contaminant)    C. PERITONEAL NODULES, BIOPSIES:  -Mesothelial lined fibroadipose tissue with acellular mucin deposits  -Remote hemorrhage and fibrosis, mixed inflammation    D. OMENTUM, RESECTION:  -Omental fat with acellular mucin deposits    E.  CECUM, PARTIAL CECECTOMY:  -Cecal wall with serosal adhesions, focal acellular mucin deposits within periserosal soft tissue  -Colonic mucosa with no significant histologic abnormality, negative for dysplasia  -Fibrosis, focal fat necrosis and foreign body giant cell reaction compatible with site of prior surgical intervention     Interval history: Doing well. Minimal pain currently. No longer taking narcotic pain or any OTC meds. On a low fiber diet, no nausea. Bowel movements have been like toothpaste consistency but then he had some diarrhea on the 4th and 5th. He took some pepto which helped, and now he feels like he might be a little constipated.     Physical Examination:   /76 (BP Location: Left arm, Patient Position: Sitting, Cuff Size: Adult Regular)   Pulse 64   Ht 5' 8.5\"   Wt 188 lb 12.8 oz   SpO2 95%   BMI 28.29 kg/m    General: alert, oriented, in no acute distress, sitting comfortably  Respiratory: non-labored breathing on RA  Abdomen: Soft. Midline incision and 4 lap incisions well approximated, " covered with Dermabond Prineo. No erythema. No drainage.    Results for orders placed or performed in visit on 07/08/24 (from the past 24 hour(s))   Magnesium   Result Value Ref Range    Magnesium 2.2 1.7 - 2.3 mg/dL   Phosphorus   Result Value Ref Range    Phosphorus 2.8 2.5 - 4.5 mg/dL   Comprehensive metabolic panel   Result Value Ref Range    Sodium 139 135 - 145 mmol/L    Potassium 4.3 3.4 - 5.3 mmol/L    Carbon Dioxide (CO2) 26 22 - 29 mmol/L    Anion Gap 10 7 - 15 mmol/L    Urea Nitrogen 19.7 8.0 - 23.0 mg/dL    Creatinine 0.86 0.67 - 1.17 mg/dL    GFR Estimate 88 >60 mL/min/1.73m2    Calcium 9.6 8.8 - 10.2 mg/dL    Chloride 103 98 - 107 mmol/L    Glucose 92 70 - 99 mg/dL    Alkaline Phosphatase 73 40 - 150 U/L    AST 19 0 - 45 U/L    ALT 14 0 - 70 U/L    Protein Total 7.2 6.4 - 8.3 g/dL    Albumin 4.1 3.5 - 5.2 g/dL    Bilirubin Total 0.2 <=1.2 mg/dL   INR   Result Value Ref Range    INR 1.04 0.85 - 1.15   CBC with Platelets & Differential    Narrative    The following orders were created for panel order CBC with Platelets & Differential.  Procedure                               Abnormality         Status                     ---------                               -----------         ------                     CBC with platelets and d...[[id number]]  Abnormal            Final result                 Please view results for these tests on the individual orders.   CBC with platelets and differential   Result Value Ref Range    WBC Count 4.9 4.0 - 11.0 10e3/uL    RBC Count 4.39 (L) 4.40 - 5.90 10e6/uL    Hemoglobin 13.1 (L) 13.3 - 17.7 g/dL    Hematocrit 40.2 40.0 - 53.0 %    MCV 92 78 - 100 fL    MCH 29.8 26.5 - 33.0 pg    MCHC 32.6 31.5 - 36.5 g/dL    RDW 14.6 10.0 - 15.0 %    Platelet Count 244 150 - 450 10e3/uL    % Neutrophils 55 %    % Lymphocytes 29 %    % Monocytes 14 %    % Eosinophils 2 %    % Basophils 0 %    % Immature Granulocytes 0 %    NRBCs per 100 WBC 0 <1 /100    Absolute Neutrophils 2.7 1.6 -  8.3 10e3/uL    Absolute Lymphocytes 1.4 0.8 - 5.3 10e3/uL    Absolute Monocytes 0.7 0.0 - 1.3 10e3/uL    Absolute Eosinophils 0.1 0.0 - 0.7 10e3/uL    Absolute Basophils 0.0 0.0 - 0.2 10e3/uL    Absolute Immature Granulocytes 0.0 <=0.4 10e3/uL    Absolute NRBCs 0.0 10e3/uL     Assessment/Plan:  78 year old male with a 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 Dela Cruz. Doing well after surgery and recovering appropriately. Can slowly advance to a regular diet as tolerated. No lifting >10 lb for 6 weeks postop. Recommended a daily fiber supplement. Already scheduled to see Dr. Dela Cruz on 8/5/24. Contact us with any questions or concerns in the meantime. Patient's questions were answered to their stated satisfaction and they are in agreement with this plan.    Past Medical History:   Diagnosis Date    Ascending aorta dilation (H24)     CAD (coronary artery disease)     Concussion     Gastroesophageal reflux disease     HLD (hyperlipidemia)     HTN (hypertension)     Kidney stone     Malignant neoplasm of appendix (H)     PONV (postoperative nausea and vomiting)     Pseudomyxoma peritonei (H)     Vertigo      Past Surgical History:   Procedure Laterality Date    COLONOSCOPY      mulitple    IR MISCELLANEOUS PROCEDURE  01/18/2006    IR MISCELLANEOUS PROCEDURE  01/18/2006    KNEE SURGERY Bilateral     LAPAROSCOPIC APPENDECTOMY N/A 5/14/2024    Procedure: LAPAROSCOPY, DIAGNOSTIC, with peritineal cytology and appendectomy;  Surgeon: Justin Dela Cruz MD;  Location: [facility] OR    LAPAROTOMY, TUMOR DEBULKING, CHEMOTHERAPY, COMBINED N/A 6/20/2024    Procedure: OPEN PARTIAL CYTOREDUCTIVE SURGERY, WITH HYPERTHERMIC INTRAPERITONEAL CHEMOTHERAPY;  Surgeon: Justin Dela Cruz MD;  Location: [facility] OR    LUMBAR DISCECTOMY      PERCUTANEOUS NEPHROSTOMY      SHOULDER  SURGERY       Current Outpatient Medications   Medication Sig Dispense Refill    enoxaparin ANTICOAGULANT (LOVENOX) 40 MG/0.4ML syringe Inject 0.4 mLs (40 mg) Subcutaneous every 24 hours for 26 days 10.4 mL 0    ezetimibe (ZETIA) 10 MG tablet Take 10 mg by mouth at bedtime      losartan (COZAAR) 25 MG tablet Take 1 tablet (25 mg) by mouth at bedtime      rosuvastatin (CRESTOR) 5 MG tablet Take 5 mg by mouth every other day Takes 5 mg po every other day      vitamin A 3 MG (94479 UNITS) capsule Take 2 capsules (20,000 Units) by mouth 2 times daily for 30 days 120 capsule 0    nitroGLYcerin (NITROSTAT) 0.4 MG sublingual tablet Place 0.4 mg under the tongue every 5 minutes as needed for chest pain (Patient not taking: Reported on 7/8/2024)      ondansetron (ZOFRAN ODT) 4 MG ODT tab Take 1 tablet (4 mg) by mouth every 6 hours as needed for nausea or vomiting 20 tablet 0    oxyCODONE (ROXICODONE) 5 MG tablet Take 1 tablet (5 mg) by mouth every 6 hours as needed for moderate to severe pain 15 tablet 0    polyethylene glycol (MIRALAX) 17 GM/Dose powder Take 17 g (1 Capful) by mouth 2 times daily for 30 days 510 g 0    polyethylene glycol (MIRALAX) 17 GM/Dose powder Take 17 g by mouth 2 times daily 510 g 0     Allergies   Allergen Reactions    Blood Transfusion Related (Informational Only)      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  A delay in compatible RBCs may occur.    Gabapentin Dizziness     Family History   Problem Relation Age of Onset    Diabetes Type 2  Sister     Diabetes Type 2  Sister     Diabetes Type 2  Sister     Diabetes Type 2  Brother     Diabetes Type 2  Brother     Diabetes Type 2  Brother     Diabetes Type 2  Brother     Diabetes Type 2  Brother     Diabetes Type 2  Brother     Stomach Cancer Maternal Grandmother     Anesthesia Reaction No family hx of     Clotting Disorder No family hx of     Bleeding Disorder No family hx of      Social History      Tobacco Use    Smoking status: Never    Smokeless tobacco: Never   Substance Use Topics    Alcohol use: Never     Marital status: .      Eleonora Huerta PA-C  Colon and Rectal Surgery  [facility]      I spent a total of 20 minutes on the day of the visit.  Time spent on date of encounter doing chart review, history and exam, documentation, and further activities in this note. This is a postoperative visit.

## 2024-07-30 NOTE — PROGRESS NOTES
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    INTERVAL HISTORY: Denies increased pain, fevers, or chills. Tolerating diet well. Having regular and formed bowel movements without blood. Off narcotic pain meds.    Physical Examination:  /82 (BP Location: Left arm, Patient Position: Sitting, Cuff Size: Adult Regular)   Pulse 64   Ht 1.74 m (5' 8.5\")   Wt 85.3 kg (188 lb)   SpO2 98%   BMI 28.17 kg/m    Abdomen soft, nontender. Incisions with no evidence of infection or hernia.    ASSESSMENT  Doing well postop.    Final Diagnosis         A. UMBILICUS, RESECTION:  -Benign skin and adipose tissue with focal fat necrosis, negative for malignancy     B. FALCIFORM LIGAMENT, EXCISION:  -Benign fibroadipose tissue with minute aggregate of acellular mucin at the periphery (possible contaminant)     C. PERITONEAL NODULES, BIOPSIES:  -Mesothelial lined fibroadipose tissue with acellular mucin deposits  -Remote hemorrhage and fibrosis, mixed inflammation     D. OMENTUM, RESECTION:  -Omental fat with acellular mucin deposits     E.  CECUM, PARTIAL CECECTOMY:  -Cecal wall with serosal adhesions, focal acellular mucin deposits within periserosal soft tissue  -Colonic mucosa with no significant histologic abnormality, negative for dysplasia  -Fibrosis, focal fat necrosis and foreign body giant cell reaction compatible with site of prior surgical intervention     PLAN  High-fiber diet.  No activity restrictions.  Colonoscopy in 1 year.  MR abdomen and pelvis (peritoneal protocol) as well as tumor markers and labs, and clinic visit in 4 months.    30 minutes spent on the date of encounter performing chart review, history and exam, documentation " and further activities.     Justin Dela Cruz MD, MSc, FACS, FASCRS.    Chief, Division of Colon & Rectal Surgery  Stanley M. Goldberg, MD Endowed Chair, Colon & Rectal Surgery  Department of Surgery  [facility]      Referring Provider:  Kiet Mckeon MD     Primary Care Provider:  Sean Melendrez

## 2024-08-05 ENCOUNTER — OFFICE VISIT (OUTPATIENT)
Dept: SURGERY | Facility: CLINIC | Age: 79
End: 2024-08-05
Payer: COMMERCIAL

## 2024-08-05 VITALS
WEIGHT: 188 LBS | OXYGEN SATURATION: 98 % | HEART RATE: 64 BPM | BODY MASS INDEX: 27.85 KG/M2 | SYSTOLIC BLOOD PRESSURE: 122 MMHG | HEIGHT: 69 IN | DIASTOLIC BLOOD PRESSURE: 82 MMHG

## 2024-08-05 DIAGNOSIS — R97.8 OTHER ABNORMAL TUMOR MARKERS: ICD-10-CM

## 2024-08-05 DIAGNOSIS — Z09 FOLLOW-UP EXAMINATION AFTER COLORECTAL SURGERY: Primary | ICD-10-CM

## 2024-08-05 DIAGNOSIS — C78.6 PSEUDOMYXOMA PERITONEI (H): ICD-10-CM

## 2024-08-05 DIAGNOSIS — D37.3 LOW GRADE MUCINOUS NEOPLASM OF APPENDIX: ICD-10-CM

## 2024-08-05 PROCEDURE — 99024 POSTOP FOLLOW-UP VISIT: CPT | Performed by: COLON & RECTAL SURGERY

## 2024-08-05 ASSESSMENT — PAIN SCALES - GENERAL: PAINLEVEL: NO PAIN (0)

## 2024-08-05 NOTE — LETTER
"2024       RE: Juan Delarosa  [address]  [facility] [address]     Dear Colleague,    Thank you for referring your patient, Juan Delarosa, to the [facility] [place] [facility]. Please see a copy of my visit note below.    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    INTERVAL HISTORY: Denies increased pain, fevers, or chills. Tolerating diet well. Having regular and formed bowel movements without blood. Off narcotic pain meds.    Physical Examination:  /82 (BP Location: Left arm, Patient Position: Sitting, Cuff Size: Adult Regular)   Pulse 64   Ht 1.74 m (5' 8.5\")   Wt 85.3 kg (188 lb)   SpO2 98%   BMI 28.17 kg/m    Abdomen soft, nontender. Incisions with no evidence of infection or hernia.    ASSESSMENT  Doing well postop.    Final Diagnosis         A. UMBILICUS, RESECTION:  -Benign skin and adipose tissue with focal fat necrosis, negative for malignancy     B. FALCIFORM LIGAMENT, EXCISION:  -Benign fibroadipose tissue with minute aggregate of acellular mucin at the periphery (possible contaminant)     C. PERITONEAL NODULES, BIOPSIES:  -Mesothelial lined fibroadipose tissue with acellular mucin deposits  -Remote hemorrhage and fibrosis, mixed inflammation     D. OMENTUM, RESECTION:  -Omental fat with acellular mucin deposits     E.  CECUM, PARTIAL CECECTOMY:  -Cecal wall with serosal adhesions, focal acellular mucin deposits within periserosal soft tissue  -Colonic mucosa with no significant histologic abnormality, negative for dysplasia  -Fibrosis, focal fat necrosis and foreign body giant cell reaction compatible with site of " prior surgical intervention     PLAN  High-fiber diet.  No activity restrictions.  Colonoscopy in 1 year.  MR abdomen and pelvis (peritoneal protocol) as well as tumor markers and labs, and clinic visit in 4 months.    30 minutes spent on the date of encounter performing chart review, history and exam, documentation and further activities.       Referring Provider:  Kiet Mckeon MD     Primary Care Provider:  Sean Melendrez      Again, thank you for allowing me to participate in the care of your patient.      Sincerely,    Justin Dela Cruz MD

## 2024-08-05 NOTE — NURSING NOTE
"Chief Complaint   Patient presents with    Post-op Visit       Vitals:    08/05/24 0921   BP: 122/82   BP Location: Left arm   Patient Position: Sitting   Cuff Size: Adult Regular   Pulse: 64   SpO2: 98%   Weight: 188 lb   Height: 5' 8.5\"       Body mass index is 28.17 kg/m .    Chelsy Austin CMA    "

## 2024-08-05 NOTE — PATIENT INSTRUCTIONS
Follow up:  High fiber diet  No activity restrictions.  Colonoscopy in 1 year  MR A/P and labs in 4 months.  Virtual clinic visit after the imaging and labs      Please call with any questions or concerns regarding your clinic visit today.    It is a pleasure being involved in your health care.    Contacts post-consultation depending on your need:    Radiology Appointments [phone]    Schedule Clinic Appointments [phone] # 1   M-F 7:30 - 5 pm    DARNELL Wheeler [phone]    Clinic Fax Number [phone]    Surgery Scheduling [phone]    My Chart is available 24 hours a day and is a secure way to access your records and communicate with your care team.  I strongly recommend signing up if you haven't already done so, if you are comfortable with computers.  If you would like to inquire about this or are having problems with My Chart access, you may call [phone] or go online at [email or web address].  Please allow at least 24 hours for a response and extra time on weekends and Holidays.

## 2024-11-08 ENCOUNTER — TELEPHONE (OUTPATIENT)
Dept: SURGERY | Facility: CLINIC | Age: 79
End: 2024-11-08
Payer: COMMERCIAL

## 2024-11-08 NOTE — TELEPHONE ENCOUNTER
Left Voicemail (1st Attempt) and Sent Mychart (1st Attempt) for the patient to call back and schedule the following:    Appointment type: RET Patient   Provider:   Return date: 12/09/2024  Specialty phone number: [phone]  Additional appointment(s) needed: n/a  Additonal Notes: Provider unavailable, pt needs to reschedule  (11/25/2024 at 330 or 01/06/25 at 130)

## 2024-11-11 ENCOUNTER — TELEPHONE (OUTPATIENT)
Dept: SURGERY | Facility: CLINIC | Age: 79
End: 2024-11-11
Payer: COMMERCIAL

## 2024-11-11 NOTE — TELEPHONE ENCOUNTER
Patient confirmed scheduled appointment:  Date: 12/13/24  Time: 1230 pm   Visit type: Ret Patient  Provider:   Location: [facility]/telephone  Testing/imaging: n/a  Additional notes: Pt r/s from 12/9/24

## 2024-12-03 ENCOUNTER — LAB (OUTPATIENT)
Dept: LAB | Facility: CLINIC | Age: 79
End: 2024-12-03
Payer: COMMERCIAL

## 2024-12-03 ENCOUNTER — LAB (OUTPATIENT)
Dept: LAB | Facility: CLINIC | Age: 79
End: 2024-12-03
Payer: MEDICARE

## 2024-12-03 ENCOUNTER — ANCILLARY PROCEDURE (OUTPATIENT)
Dept: MRI IMAGING | Facility: CLINIC | Age: 79
End: 2024-12-03
Attending: COLON & RECTAL SURGERY
Payer: COMMERCIAL

## 2024-12-03 DIAGNOSIS — Z09 FOLLOW-UP EXAMINATION AFTER COLORECTAL SURGERY: ICD-10-CM

## 2024-12-03 DIAGNOSIS — R97.8 OTHER ABNORMAL TUMOR MARKERS: ICD-10-CM

## 2024-12-03 DIAGNOSIS — D37.3 LOW GRADE MUCINOUS NEOPLASM OF APPENDIX: ICD-10-CM

## 2024-12-03 DIAGNOSIS — C78.6 PSEUDOMYXOMA PERITONEI (H): ICD-10-CM

## 2024-12-03 LAB
ALBUMIN SERPL BCG-MCNC: 4.2 G/DL (ref 3.5–5.2)
ALP SERPL-CCNC: 62 U/L (ref 40–150)
ALT SERPL W P-5'-P-CCNC: 20 U/L (ref 0–70)
ANION GAP SERPL CALCULATED.3IONS-SCNC: 8 MMOL/L (ref 7–15)
AST SERPL W P-5'-P-CCNC: 22 U/L (ref 0–45)
BASOPHILS # BLD AUTO: 0 10E3/UL (ref 0–0.2)
BASOPHILS NFR BLD AUTO: 1 %
BILIRUB SERPL-MCNC: 0.4 MG/DL
BUN SERPL-MCNC: 22.4 MG/DL (ref 8–23)
CALCIUM SERPL-MCNC: 9.2 MG/DL (ref 8.8–10.4)
CANCER AG125 SERPL-ACNC: 31 U/ML
CEA SERPL-MCNC: 1.6 NG/ML
CHLORIDE SERPL-SCNC: 105 MMOL/L (ref 98–107)
CREAT SERPL-MCNC: 0.95 MG/DL (ref 0.67–1.17)
EGFRCR SERPLBLD CKD-EPI 2021: 81 ML/MIN/1.73M2
EOSINOPHIL # BLD AUTO: 0.2 10E3/UL (ref 0–0.7)
EOSINOPHIL NFR BLD AUTO: 3 %
ERYTHROCYTE [DISTWIDTH] IN BLOOD BY AUTOMATED COUNT: 14 % (ref 10–15)
GLUCOSE SERPL-MCNC: 125 MG/DL (ref 70–99)
HCO3 SERPL-SCNC: 27 MMOL/L (ref 22–29)
HCT VFR BLD AUTO: 43.6 % (ref 40–53)
HGB BLD-MCNC: 14.4 G/DL (ref 13.3–17.7)
IMM GRANULOCYTES # BLD: 0 10E3/UL
IMM GRANULOCYTES NFR BLD: 0 %
LYMPHOCYTES # BLD AUTO: 1.5 10E3/UL (ref 0.8–5.3)
LYMPHOCYTES NFR BLD AUTO: 25 %
MCH RBC QN AUTO: 30.6 PG (ref 26.5–33)
MCHC RBC AUTO-ENTMCNC: 33 G/DL (ref 31.5–36.5)
MCV RBC AUTO: 93 FL (ref 78–100)
MONOCYTES # BLD AUTO: 0.7 10E3/UL (ref 0–1.3)
MONOCYTES NFR BLD AUTO: 11 %
NEUTROPHILS # BLD AUTO: 3.7 10E3/UL (ref 1.6–8.3)
NEUTROPHILS NFR BLD AUTO: 60 %
NRBC # BLD AUTO: 0 10E3/UL
NRBC BLD AUTO-RTO: 0 /100
PLATELET # BLD AUTO: 228 10E3/UL (ref 150–450)
POTASSIUM SERPL-SCNC: 4.4 MMOL/L (ref 3.4–5.3)
PROT SERPL-MCNC: 7.2 G/DL (ref 6.4–8.3)
RBC # BLD AUTO: 4.7 10E6/UL (ref 4.4–5.9)
SODIUM SERPL-SCNC: 140 MMOL/L (ref 135–145)
WBC # BLD AUTO: 6.2 10E3/UL (ref 4–11)

## 2024-12-03 PROCEDURE — 82378 CARCINOEMBRYONIC ANTIGEN: CPT | Performed by: COLON & RECTAL SURGERY

## 2024-12-03 PROCEDURE — 86301 IMMUNOASSAY TUMOR CA 19-9: CPT | Mod: 90 | Performed by: PATHOLOGY

## 2024-12-03 PROCEDURE — 80053 COMPREHEN METABOLIC PANEL: CPT | Performed by: PATHOLOGY

## 2024-12-03 PROCEDURE — 36415 COLL VENOUS BLD VENIPUNCTURE: CPT | Performed by: PATHOLOGY

## 2024-12-03 PROCEDURE — 86304 IMMUNOASSAY TUMOR CA 125: CPT | Performed by: COLON & RECTAL SURGERY

## 2024-12-03 PROCEDURE — 85025 COMPLETE CBC W/AUTO DIFF WBC: CPT | Performed by: PATHOLOGY

## 2024-12-03 PROCEDURE — 99000 SPECIMEN HANDLING OFFICE-LAB: CPT | Performed by: PATHOLOGY

## 2024-12-03 RX ORDER — GADOBUTROL 604.72 MG/ML
10 INJECTION INTRAVENOUS ONCE
Status: COMPLETED | OUTPATIENT
Start: 2024-12-03 | End: 2024-12-03

## 2024-12-03 RX ADMIN — GADOBUTROL 8.5 ML: 604.72 INJECTION INTRAVENOUS at 10:17

## 2024-12-03 NOTE — NURSING NOTE
PIV placed by imaging on first floor. Brisk blood return noted. 7 mL blood discarded prior to lab collection from PIV. PIV removed after blood draw.

## 2024-12-05 LAB — CANCER AG19-9 SERPL IA-ACNC: 11 U/ML

## 2025-03-15 ENCOUNTER — HEALTH MAINTENANCE LETTER (OUTPATIENT)
Age: 80
End: 2025-03-15

## 2025-03-18 DIAGNOSIS — R97.8 OTHER ABNORMAL TUMOR MARKERS: ICD-10-CM

## 2025-03-18 DIAGNOSIS — D37.3 LOW GRADE MUCINOUS NEOPLASM OF APPENDIX: Primary | ICD-10-CM

## 2025-03-27 ENCOUNTER — TELEPHONE (OUTPATIENT)
Dept: GASTROENTEROLOGY | Facility: CLINIC | Age: 80
End: 2025-03-27
Payer: COMMERCIAL

## 2025-03-27 NOTE — TELEPHONE ENCOUNTER
"Endoscopy Scheduling Screen    Have you had any respiratory illness or flu-like symptoms in the last 10 days?  No    What is your communication preference for Instructions and/or Bowel Prep?   MyChart    What insurance is in the chart?  Other:  [employer]    Ordering/Referring Provider:   DONITA DELGADO    (If ordering provider performs procedure, schedule with ordering provider unless otherwise instructed. )    BMI: Estimated body mass index is 28.17 kg/m  as calculated from the following:    Height as of 8/5/24: 1.74 m (5' 8.5\").    Weight as of 8/5/24: 85.3 kg (188 lb).     Sedation Ordered  moderate sedation.   If patient BMI > 50 do not schedule in ASC.    If patient BMI > 45 do not schedule at [facility].    Are you taking methadone or Suboxone?  NO, No RN review required.    Have you been diagnosed and are being treated for severe PTSD or severe anxiety?  NO, No RN review required.    Are you taking any prescription medications for pain 3 or more times per week?   NO, No RN review required.    Do you have a history of malignant hyperthermia?  No    (Females) Are you currently pregnant?   No     Have you been diagnosed or told you have pulmonary hypertension?   No    Do you have an LVAD?  No    Have you been told you have moderate to severe sleep apnea?  No.    Have you been told you have COPD, asthma, or any other lung disease?  No    Has your doctor ordered any cardiac tests like echo, angiogram, stress test, ablation, or EKG, that you have not completed yet?  No    Do you  have a history of any heart conditions?  Yes     Have you had any hospitalizations  in the last year for heart related issues, for example a stent placement, heart attack, or cardiomyopathy?  No    Do you have any implantable devices in your body (pacemaker, ICD)?  Other stent    Do you take nitroglycerine?  No    Have you ever had or are you waiting for an organ transplant?  No. Continue scheduling, no site restrictions.    Have you had " "a stroke or transient ischemic attack (TIA aka \"mini stroke\") in the last 2 years?   No.    Have you been diagnosed with or been told you have cirrhosis of the liver?   No.    Are you currently on dialysis?   No    Do you need assistance transferring?   No    BMI: Estimated body mass index is 28.17 kg/m  as calculated from the following:    Height as of 8/5/24: 1.74 m (5' 8.5\").    Weight as of 8/5/24: 85.3 kg (188 lb).     Is patients BMI > 40 and scheduling location [facility]?  No    Do you take an injectable or oral medication for weight loss or diabetes (excluding insulin)?  No    Do you take the medication Naltrexone?  No    Do you take blood thinners?  No       Prep   Are you currently on dialysis or do you have chronic kidney disease?  No    Do you have a diagnosis of diabetes?  No    Do you have a diagnosis of cystic fibrosis (CF)?  No    On a regular basis do you go 3 -5 days between bowel movements?  No    BMI > 40?  No    Preferred Pharmacy:    [id number] - [place], [place] - [address] [facility] [address]  [address]  [place] [address]  Phone: [phone] Fax: [phone]      Final Scheduling Details     Procedure scheduled  Colonoscopy    Surgeon:  DONITA DELGADO       Date of procedure:  9/17     Pre-OP / PAC:   No - Not required for this site.    Location  [facility] [place] - Per order.    Sedation   MAC/Deep Sedation  site      Patient Reminders:   You will receive a call from a Nurse to review instructions and health history.  This assessment must be completed prior to your procedure.  Failure to complete the Nurse assessment may result in the procedure being cancelled.      On the day of your procedure, please designate an adult(s) who can drive you home stay with you for the next 24 hours. The medicines used in the exam will make you sleepy. You will not be able to drive.      You cannot take public transportation, ride share services, or non-medical taxi service " without a responsible caregiver.  Medical transport services are allowed with the requirement that a responsible caregiver will receive you at your destination.  We require that drivers and caregivers are confirmed prior to your procedure.

## 2025-05-08 ENCOUNTER — TELEPHONE (OUTPATIENT)
Dept: SURGERY | Facility: CLINIC | Age: 80
End: 2025-05-08
Payer: COMMERCIAL

## 2025-05-08 NOTE — TELEPHONE ENCOUNTER
Left Voicemail (1st Attempt) for the patient to call back and schedule the following:    Appointment type: Ret Patient   Provider:    Return date: August 2025  Specialty phone number: [phone]  Additional appointment(s) needed: n/a  Additonal Notes: Pt needs phone or virtual visit after imaging/labs//pt needs to reschedule labs,MRI for August not September

## 2025-05-08 NOTE — TELEPHONE ENCOUNTER
Patient confirmed scheduled appointment:  Date: 9/22/2025  Time: 830 am   Visit type: Ret Patient   Provider:    Location: [facility]//phone visit   Testing/imaging: n/a  Additional notes: follow up after imaging/colonoscopy

## 2025-07-23 ENCOUNTER — TELEPHONE (OUTPATIENT)
Dept: GASTROENTEROLOGY | Facility: CLINIC | Age: 80
End: 2025-07-23
Payer: COMMERCIAL

## 2025-07-23 NOTE — TELEPHONE ENCOUNTER
Caller: call to Juan Delarosa    Reason for Reschedule/Cancellation (please be detailed, any staff messages or encounters to note?):   DR OUT OF OFFICE    Did you cancel or rescheduled an EUS procedure? No.    Is screening questionnaire older than 3 months from the reschedule date.   If Yes, please complete screening questionnaire. No    Prior to reschedule please review:  Ordering Provider: SANDY  Sedation Determined: MAC (PER SITE)  Does patient have any ASC Exclusions, please identify?: NO    Notes on Cancelled Procedure:  Procedure: Lower Endoscopy [Colonoscopy]   Date: 9/17/25  Location: [facility] [place]; [address], [place] [address]  Surgeon: SANDY    Rescheduled: Yes,   Procedure: Lower Endoscopy [Colonoscopy]    Date: 10/1/25   Location: [facility]; [address], [place] [address]   Surgeon: SANDY   Sedation Level Scheduled  MAC ,  Reason for Sedation Level PER SITE   Instructions updated and sent: YES     Does patient need PAC or Pre -Op Rescheduled? : NO

## (undated) DEVICE — WIPES FOLEY CARE SURESTEP PROVON DFC100

## (undated) DEVICE — DRAPE IOBAN INCISE 23X17" 6650EZ

## (undated) DEVICE — SU MONOCRYL 4-0 PS-2 27" UND Y426H

## (undated) DEVICE — Device

## (undated) DEVICE — SU ETHILON 2-0 FS 18" 664H

## (undated) DEVICE — SUCTION TIP YANKAUER W/O VENT K86

## (undated) DEVICE — SU VICRYL+ 0 27 UR6 VLT VCP603H

## (undated) DEVICE — SUCTION TIP POOLE K770

## (undated) DEVICE — DRAPE LEGGINGS CLEAR 8430

## (undated) DEVICE — GLOVE BIOGEL PI MICRO INDICATOR UNDERGLOVE SZ 8.0 48980

## (undated) DEVICE — STPL RELOAD 100 X 3.8MM GIA10038L

## (undated) DEVICE — SU PROLENE 1 CTX 30" 8455H

## (undated) DEVICE — SU SILK 2-0 TIE 12X30" A305H

## (undated) DEVICE — TUBING SMOKE EVAC PNEUMOCLEAR HIGH FLOW 0620050250

## (undated) DEVICE — PREP CHLORAPREP 26ML TINTED HI-LITE ORANGE 930815

## (undated) DEVICE — SPECIMEN TRAP MUCOUS 40ML LUKI C30200A

## (undated) DEVICE — STPL POWERED ECHELON 60MM PSEE60A

## (undated) DEVICE — DRAPE SHEET REV FOLD 3/4 9349

## (undated) DEVICE — LINEN TOWEL PACK X6 WHITE 5487

## (undated) DEVICE — ENDO TROCAR BLUNT TIP KII BALLOON 12X100MM C0R47

## (undated) DEVICE — SU PDS II 4-0 FS-2 27" Z422H

## (undated) DEVICE — ESU CLEANER TIP 31142717

## (undated) DEVICE — CATH TRAY FOLEY SURESTEP 16FR W/URNE MTR STLK LATEX A303316A

## (undated) DEVICE — SOL HYDROGEN PEROXIDE 3% 4OZ BOTTLE F0010

## (undated) DEVICE — SUCTION CANISTER 12L

## (undated) DEVICE — LINEN TOWEL PACK X30 5481

## (undated) DEVICE — SU SILK 4-0 RB-1 CR 8X18" C054D

## (undated) DEVICE — DRAPE U SPLIT 74X120" 29440

## (undated) DEVICE — ENDO TROCAR FIRST ENTRY KII FIOS ADV FIX 05X100MM CFF03

## (undated) DEVICE — GLOVE BIOGEL PI MICRO SZ 7.5 48575

## (undated) DEVICE — ENDO POUCH UNIV RETRIEVAL SYSTEM INZII 10MM CD001

## (undated) DEVICE — SOL WATER IRRIG 1000ML BOTTLE 2F7114

## (undated) DEVICE — STPL RELOAD REG TISSUE ECHELON 60 X 3.6MM BLUE GST60B

## (undated) DEVICE — BAG CHEMOTHERAPY DRUGS 12X15"

## (undated) DEVICE — BARRIER SEPRAFILM 3X5" X6 SHEETS 5086-02

## (undated) DEVICE — SPONGE PACK VAGINAL 2"X9

## (undated) DEVICE — ENDO TROCAR SLEEVE KII ADV FIXATION 05X100MM CFS02

## (undated) DEVICE — SPONGE KITTNER 30-101

## (undated) DEVICE — SU VICRYL 0 TIE 54" J608H

## (undated) DEVICE — SOL WATER IRRIG 3000ML BAG 2B7117

## (undated) DEVICE — ANTIFOG SOLUTION SEE SHARP 150M TROCAR SWABS 30978

## (undated) DEVICE — SU PDS II 1 CTX 36" Z371T

## (undated) DEVICE — ESU GROUND PAD ADULT W/CORD E7507

## (undated) DEVICE — SU ETHILON 2 LP 60" D7597

## (undated) DEVICE — SU SILK 3-0 TIE 12X30" A304H

## (undated) DEVICE — WIPE PREMOIST CLEANSING WASHCLOTHS 7988

## (undated) DEVICE — DEVICE SUTURE PASSER 14GA WECK EFX EFXSP2

## (undated) DEVICE — SHEARS HARMONIC ULTRASONIC LAP 36CM CURVE TIP HAR1136

## (undated) DEVICE — BLADE CLIPPER SGL USE 9680

## (undated) DEVICE — SUCTION MANIFOLD NEPTUNE 2 SYS 4 PORT 0702-020-000

## (undated) DEVICE — PROBE RECTAL MONATHERM 9FR 90050

## (undated) DEVICE — SUCTION IRR STRYKERFLOW II W/TIP 250-070-520

## (undated) DEVICE — DRAPE SHEET MED 44X70" 9355

## (undated) DEVICE — SU SILK 0 TIE 6X30" A306H

## (undated) DEVICE — ESU LIGASURE IMPACT OPEN SEALER/DVDR CVD LG JAW LF4418

## (undated) DEVICE — ENDO TROCAR FIRST ENTRY KII FIOS Z-THRD 12X100MM CTF73

## (undated) DEVICE — KIT PATIENT POSITIONING PIGAZZI LATEX FREE 40580

## (undated) DEVICE — SPONGE LAP 18X18" X8435

## (undated) DEVICE — ESU BIOPOLAR SEALER AQUAMANTIS 2.3MM 23-113-1

## (undated) DEVICE — SU VICRYL+ 3-0 27IN SH UND VCP416H

## (undated) RX ORDER — GRANISETRON HYDROCHLORIDE 1 MG/ML
INJECTION INTRAVENOUS
Status: DISPENSED
Start: 2024-05-14

## (undated) RX ORDER — CEFAZOLIN SODIUM/WATER 2 G/20 ML
SYRINGE (ML) INTRAVENOUS
Status: DISPENSED
Start: 2024-06-20

## (undated) RX ORDER — ACETAMINOPHEN 325 MG/1
TABLET ORAL
Status: DISPENSED
Start: 2024-05-14

## (undated) RX ORDER — KETOROLAC TROMETHAMINE 30 MG/ML
INJECTION, SOLUTION INTRAMUSCULAR; INTRAVENOUS
Status: DISPENSED
Start: 2024-06-20

## (undated) RX ORDER — PROPOFOL 10 MG/ML
INJECTION, EMULSION INTRAVENOUS
Status: DISPENSED
Start: 2024-06-20

## (undated) RX ORDER — EPHEDRINE SULFATE 50 MG/ML
INJECTION, SOLUTION INTRAMUSCULAR; INTRAVENOUS; SUBCUTANEOUS
Status: DISPENSED
Start: 2024-06-20

## (undated) RX ORDER — METRONIDAZOLE 500 MG/100ML
INJECTION, SOLUTION INTRAVENOUS
Status: DISPENSED
Start: 2024-05-14

## (undated) RX ORDER — FENTANYL CITRATE 50 UG/ML
INJECTION, SOLUTION INTRAMUSCULAR; INTRAVENOUS
Status: DISPENSED
Start: 2024-06-20

## (undated) RX ORDER — FENTANYL CITRATE-0.9 % NACL/PF 10 MCG/ML
PLASTIC BAG, INJECTION (ML) INTRAVENOUS
Status: DISPENSED
Start: 2024-06-20

## (undated) RX ORDER — FENTANYL CITRATE-0.9 % NACL/PF 10 MCG/ML
PLASTIC BAG, INJECTION (ML) INTRAVENOUS
Status: DISPENSED
Start: 2024-05-14

## (undated) RX ORDER — HYDROMORPHONE HYDROCHLORIDE 1 MG/ML
INJECTION, SOLUTION INTRAMUSCULAR; INTRAVENOUS; SUBCUTANEOUS
Status: DISPENSED
Start: 2024-06-20

## (undated) RX ORDER — SODIUM CHLORIDE, SODIUM LACTATE, POTASSIUM CHLORIDE, CALCIUM CHLORIDE 600; 310; 30; 20 MG/100ML; MG/100ML; MG/100ML; MG/100ML
INJECTION, SOLUTION INTRAVENOUS
Status: DISPENSED
Start: 2024-06-20

## (undated) RX ORDER — CEFAZOLIN SODIUM/WATER 2 G/20 ML
SYRINGE (ML) INTRAVENOUS
Status: DISPENSED
Start: 2024-05-14

## (undated) RX ORDER — DEXAMETHASONE SODIUM PHOSPHATE 4 MG/ML
INJECTION, SOLUTION INTRA-ARTICULAR; INTRALESIONAL; INTRAMUSCULAR; INTRAVENOUS; SOFT TISSUE
Status: DISPENSED
Start: 2024-06-20

## (undated) RX ORDER — ACETAMINOPHEN 325 MG/10.15ML
LIQUID ORAL
Status: DISPENSED
Start: 2024-06-20

## (undated) RX ORDER — KETOROLAC TROMETHAMINE 30 MG/ML
INJECTION, SOLUTION INTRAMUSCULAR; INTRAVENOUS
Status: DISPENSED
Start: 2024-05-14

## (undated) RX ORDER — ENOXAPARIN SODIUM 100 MG/ML
INJECTION SUBCUTANEOUS
Status: DISPENSED
Start: 2024-05-14

## (undated) RX ORDER — CELECOXIB 200 MG/1
CAPSULE ORAL
Status: DISPENSED
Start: 2024-06-20

## (undated) RX ORDER — LIDOCAINE HYDROCHLORIDE 10 MG/ML
INJECTION, SOLUTION EPIDURAL; INFILTRATION; INTRACAUDAL; PERINEURAL
Status: DISPENSED
Start: 2024-06-20

## (undated) RX ORDER — FENTANYL CITRATE 50 UG/ML
INJECTION, SOLUTION INTRAMUSCULAR; INTRAVENOUS
Status: DISPENSED
Start: 2024-05-14

## (undated) RX ORDER — HYDROMORPHONE HCL IN WATER/PF 6 MG/30 ML
PATIENT CONTROLLED ANALGESIA SYRINGE INTRAVENOUS
Status: DISPENSED
Start: 2024-06-20

## (undated) RX ORDER — CELECOXIB 200 MG/1
CAPSULE ORAL
Status: DISPENSED
Start: 2024-05-14

## (undated) RX ORDER — EPHEDRINE SULFATE 50 MG/ML
INJECTION, SOLUTION INTRAMUSCULAR; INTRAVENOUS; SUBCUTANEOUS
Status: DISPENSED
Start: 2024-05-14

## (undated) RX ORDER — CEFAZOLIN SODIUM 1 G/3ML
INJECTION, POWDER, FOR SOLUTION INTRAMUSCULAR; INTRAVENOUS
Status: DISPENSED
Start: 2024-06-20

## (undated) RX ORDER — GRANISETRON HYDROCHLORIDE 1 MG/ML
INJECTION INTRAVENOUS
Status: DISPENSED
Start: 2024-06-20

## (undated) RX ORDER — CALCIUM CHLORIDE 100 MG/ML
INJECTION INTRAVENOUS; INTRAVENTRICULAR
Status: DISPENSED
Start: 2024-06-20

## (undated) RX ORDER — HYDROMORPHONE HCL IN WATER/PF 6 MG/30 ML
PATIENT CONTROLLED ANALGESIA SYRINGE INTRAVENOUS
Status: DISPENSED
Start: 2024-05-14

## (undated) RX ORDER — ENOXAPARIN SODIUM 100 MG/ML
INJECTION SUBCUTANEOUS
Status: DISPENSED
Start: 2024-06-20

## (undated) RX ORDER — GLYCOPYRROLATE 0.2 MG/ML
INJECTION, SOLUTION INTRAMUSCULAR; INTRAVENOUS
Status: DISPENSED
Start: 2024-06-20

## (undated) RX ORDER — ALBUTEROL SULFATE 90 UG/1
AEROSOL, METERED RESPIRATORY (INHALATION)
Status: DISPENSED
Start: 2024-06-20

## (undated) RX ORDER — ONDANSETRON 2 MG/ML
INJECTION INTRAMUSCULAR; INTRAVENOUS
Status: DISPENSED
Start: 2024-06-20

## (undated) RX ORDER — SODIUM CHLORIDE, SODIUM LACTATE, POTASSIUM CHLORIDE, CALCIUM CHLORIDE 600; 310; 30; 20 MG/100ML; MG/100ML; MG/100ML; MG/100ML
INJECTION, SOLUTION INTRAVENOUS
Status: DISPENSED
Start: 2024-05-14

## (undated) RX ORDER — METRONIDAZOLE 500 MG/100ML
INJECTION, SOLUTION INTRAVENOUS
Status: DISPENSED
Start: 2024-06-20